# Patient Record
Sex: FEMALE | Race: WHITE | NOT HISPANIC OR LATINO | Employment: UNEMPLOYED | URBAN - METROPOLITAN AREA
[De-identification: names, ages, dates, MRNs, and addresses within clinical notes are randomized per-mention and may not be internally consistent; named-entity substitution may affect disease eponyms.]

---

## 2022-09-27 ENCOUNTER — OFFICE VISIT (OUTPATIENT)
Dept: OBSTETRICS AND GYNECOLOGY | Facility: CLINIC | Age: 32
End: 2022-09-27

## 2022-09-27 VITALS
BODY MASS INDEX: 30.94 KG/M2 | DIASTOLIC BLOOD PRESSURE: 72 MMHG | SYSTOLIC BLOOD PRESSURE: 120 MMHG | WEIGHT: 174.6 LBS | HEIGHT: 63 IN

## 2022-09-27 DIAGNOSIS — O09.299 HX OF PREECLAMPSIA, PRIOR PREGNANCY, CURRENTLY PREGNANT: ICD-10-CM

## 2022-09-27 DIAGNOSIS — Z32.01 POSITIVE URINE PREGNANCY TEST: ICD-10-CM

## 2022-09-27 DIAGNOSIS — N91.2 AMENORRHEA: Primary | ICD-10-CM

## 2022-09-27 DIAGNOSIS — Z87.59 HISTORY OF PRETERM PREMATURE RUPTURE OF MEMBRANES (PPROM): ICD-10-CM

## 2022-09-27 DIAGNOSIS — Z64.1 GRAND MULTIPARA: ICD-10-CM

## 2022-09-27 DIAGNOSIS — N92.6 IRREGULAR MENSES: ICD-10-CM

## 2022-09-27 LAB
B-HCG UR QL: POSITIVE
EXPIRATION DATE: ABNORMAL
INTERNAL NEGATIVE CONTROL: NEGATIVE
INTERNAL POSITIVE CONTROL: POSITIVE
Lab: ABNORMAL

## 2022-09-27 PROCEDURE — 81025 URINE PREGNANCY TEST: CPT | Performed by: NURSE PRACTITIONER

## 2022-09-27 PROCEDURE — 99203 OFFICE O/P NEW LOW 30 MIN: CPT | Performed by: NURSE PRACTITIONER

## 2022-09-27 RX ORDER — ONDANSETRON 4 MG/1
TABLET, ORALLY DISINTEGRATING ORAL
COMMUNITY
End: 2022-09-27

## 2022-09-27 RX ORDER — ASPIRIN 81 MG/1
81 TABLET, CHEWABLE ORAL DAILY
COMMUNITY
End: 2022-09-27

## 2022-09-27 RX ORDER — PNV NO.95/FERROUS FUM/FOLIC AC 28MG-0.8MG
1 TABLET ORAL DAILY
Qty: 90 TABLET | Refills: 3 | Status: SHIPPED | OUTPATIENT
Start: 2022-09-27

## 2022-09-27 RX ORDER — UREA 10 %
1 LOTION (ML) TOPICAL DAILY
COMMUNITY
End: 2022-09-27

## 2022-09-27 RX ORDER — RANITIDINE 150 MG/1
TABLET ORAL
COMMUNITY
End: 2022-09-27

## 2022-09-27 RX ORDER — FERROUS SULFATE 325(65) MG
TABLET ORAL DAILY
COMMUNITY
End: 2022-09-27

## 2022-09-27 RX ORDER — LOPERAMIDE HYDROCHLORIDE 2 MG/1
CAPSULE ORAL
COMMUNITY
End: 2022-09-27

## 2022-09-27 NOTE — PROGRESS NOTES
"Confirmation of pregnancy     Chief Complaint   Patient presents with   • Amenorrhea         Raina Grigsby is being seen today for evaluation of absence of menses. Due to her period being late, she tested for pregnancy and had + home UPT. She is a 31 y.o. . This problem is new to me, the examiner.       LNMP: 22  Confident with date: Yes but history of irregular cycles   Taking prenatal vitamins: No. Needs RX: Yes  Planned pregnancy: No  Prior obstetric issues, potential pregnancy concerns: H/O preeclampsia with first 3 pregnancies. H/O GHTN with second to last child. H/O polyhydramnios with last delivery.   Family history of genetic issues (includes FOB): denies  Varicella Hx:  vaccine  Flu vaccine: has not had this year  COVID 19 vaccine: has not had. Booster vaccine: has not had  History of STDs: denies  Current medications: Antibx wipes for HS  Last pap smear:   Smoker: Yes  Drug or alcohol abuse: No  H/O physical, emotional or sexual abuse: denies  H/O mental health disorder: denies  Prior testing for Cystic Fibrosis Carrier or Sickle Cell Trait- CF neg with a previous pregnancy   Prepregnancy BMI - Body mass index is 30.93 kg/m².    No past medical history on file.    No past surgical history on file.    No current outpatient medications on file.    Allergies   Allergen Reactions   • Albumen, Egg Anaphylaxis   • Ibuprofen Hives   • Peanut (Diagnostic) Anaphylaxis   • Lactose Intolerance [Lactase] Diarrhea     Pt says she can eat cheese       Social History     Socioeconomic History   • Marital status: Single       No family history on file.    Review of systems     Review of Systems   Constitutional: Positive for fever.   Gastrointestinal: Positive for nausea and vomiting.   Genitourinary: Positive for menstrual problem.       Objective    /72   Ht 160 cm (63\")   Wt 79.2 kg (174 lb 9.6 oz)   LMP 2022   Breastfeeding No   BMI 30.93 kg/m²     Physical Exam  Vitals and nursing note " reviewed.   Constitutional:       Appearance: Normal appearance.   Musculoskeletal:         General: Normal range of motion.   Skin:     General: Skin is warm and dry.   Neurological:      General: No focal deficit present.      Mental Status: She is alert and oriented to person, place, and time.   Psychiatric:         Mood and Affect: Mood normal.         Behavior: Behavior normal.         Assessment/Plan      1.  Missed menses: + UPT in office. LNMP 22 = 11.6 week EGA with an EDC=23. BS US confirms IUP with +FCA: Yes. Oriented to practice.     2.   Pregnancy: Disc importance of regular prenatal care. Enc PNV daily. Counseled on providers and on call phone. Disc Tylenol products are ok and encouraged no ibuprofen or ASA in pregnancy.  Disc exercise in pregnancy, diet, expected weight gain, etc. Enc no use of tobacco, vaping, drugs, or alcohol during pregnancy. Rev warn s/s of SAB.     3.   Labs: Pt counseled on genetic screening, Quad screen, AFP, and NIPS.    4.   Body mass index is 30.93 kg/m².    5.   Smoker- Approx 1/2 ppd. She is working on cutting back. Reports normally quits with pregnancy. During this visit, approx 3-5 minutes counseling the patient regarding smoking cessation. PT COUNSELED TO QUIT SMOKING IN PREGNANCY.  She has been informed that cigarette smoking is associated with increased incidence of  birth, growth restriction, SIDS, et. Disc that smoking cessation is the single most important thing she can do to improve the pregnancy outcome.  She verbalized understanding to this discussion.      6.   COVID19 precautions were reviewed with the patient. Continue to encourage social distancing, wearing a mask, and good hand hygiene.  I wore a mask, protective eye wear, and gloves during this patient encounter.  Patient also wearing a surgical mask and social distancing was observed. Hand hygeine performed before and after seeing the patient. Also encouraged COVID booster vaccine at 6  month interval from last COVID vaccine. Info provided on Covid vaccine: has not had, enc vaccine    7.  Flu vaccine. Encouraged the flu vaccine during pregnancy. Discuss normal physiological changes during pregnancy increase the susceptibility of the flu virus and increase the risk of severe illness for the pregnant woman. Disc flu can be harmful to the unborn baby as well. Enc the flu vaccine. Disc with patient that getting the flu vaccine is the first and most important step in protecting against the flu. Flu vaccines given during pregnancy help protect both the mother and the baby. Getting the flu vaccine during pregnancy also helps protect the  from flu illness for several months after their birth, when then are too young to get vaccinated. Also disc the importance of good hand hygiene and avoiding people who are sick. The patient declines the flu vaccine.     8.  Grand multip- The patient understands that she is a grand multipara because she has given birth to 5 or more infants which increases her risk during pregnancy for factors including but not limited to abnormal fetal presentation, precipitate delivery, uterine atony, placenta previa, uterine rupture, postpartum hemorrhage, etc.     9.  H/O irregular menses- Check dating US.     10. H/O PPROM @ 36 weeks with 5th child- Did not do Belén or vaginal progesterone with consecutive pregnancies    11. H/o preeclampsia and GHTN- Rec baby ASA after 12 weeks. Needs baseline CMP and 24 hr urine.     All questions answered.     I spent 30 minutes caring for Raina on this date of service. This time includes time spent by me in the following activities: preparing for the visit, reviewing tests, obtaining and/or reviewing a separately obtained history, performing a medically appropriate examination and/or evaluation, counseling and educating the patient/family/caregiver, ordering medications, tests, or procedures, referring and communicating with other health  care professionals, documenting information in the medical record and care coordination      Encounter Diagnoses   Name Primary?   • Amenorrhea Yes       Diagnoses and all orders for this visit:    Amenorrhea  -     POC Pregnancy, Urine    Other orders  -     Discontinue: Prenatal Vit-Fe Fumarate-FA (PRENATAL VITAMIN PO); Take 1 tablet by mouth Daily.  -     Discontinue: raNITIdine (Zantac) 150 MG tablet; Take  by mouth.  -     Discontinue: aspirin 81 MG chewable tablet; Chew 81 mg Daily.  -     Discontinue: calcium carbonate (OS-SAMAN) 1250 (500 Ca) MG chewable tablet; Chew 1 tablet Daily.  -     Discontinue: ferrous sulfate 325 (65 FE) MG tablet; Take  by mouth Daily.  -     Discontinue: loperamide (IMODIUM) 2 MG capsule; Take  by mouth.  -     Discontinue: ondansetron ODT (ZOFRAN-ODT) 4 MG disintegrating tablet; Take  by mouth.        RTO in 2 weeks for new OB exam, labs and US- [facility] TAIWO Olsen  9/27/2022  10:31 EDT

## 2022-10-10 ENCOUNTER — INITIAL PRENATAL (OUTPATIENT)
Dept: OBSTETRICS AND GYNECOLOGY | Facility: CLINIC | Age: 32
End: 2022-10-10

## 2022-10-10 VITALS — DIASTOLIC BLOOD PRESSURE: 86 MMHG | WEIGHT: 174 LBS | SYSTOLIC BLOOD PRESSURE: 104 MMHG | BODY MASS INDEX: 30.82 KG/M2

## 2022-10-10 DIAGNOSIS — E04.9 ENLARGED THYROID: ICD-10-CM

## 2022-10-10 DIAGNOSIS — O09.32 INITIAL OBSTETRIC VISIT IN SECOND TRIMESTER: ICD-10-CM

## 2022-10-10 DIAGNOSIS — O43.102 PLACENTAL ABNORMALITY IN SECOND TRIMESTER: ICD-10-CM

## 2022-10-10 DIAGNOSIS — O09.299 HX OF PREECLAMPSIA, PRIOR PREGNANCY, CURRENTLY PREGNANT: ICD-10-CM

## 2022-10-10 DIAGNOSIS — Z87.59 HISTORY OF PRETERM PREMATURE RUPTURE OF MEMBRANES (PPROM): ICD-10-CM

## 2022-10-10 DIAGNOSIS — Z23 NEEDS FLU SHOT: Primary | ICD-10-CM

## 2022-10-10 DIAGNOSIS — Z64.1 GRAND MULTIPARA: ICD-10-CM

## 2022-10-10 DIAGNOSIS — Z36.9 ENCOUNTER FOR ANTENATAL SCREENING, UNSPECIFIED: ICD-10-CM

## 2022-10-10 LAB
ALBUMIN SERPL-MCNC: 4.2 G/DL (ref 3.5–5.2)
ALBUMIN/GLOB SERPL: 2.6 G/DL
ALP SERPL-CCNC: 64 U/L (ref 39–117)
ALT SERPL-CCNC: 10 U/L (ref 1–33)
AST SERPL-CCNC: 13 U/L (ref 1–32)
BILIRUB SERPL-MCNC: 0.2 MG/DL (ref 0–1.2)
BUN SERPL-MCNC: 3 MG/DL (ref 6–20)
BUN/CREAT SERPL: 7.3 (ref 7–25)
CALCIUM SERPL-MCNC: 9.1 MG/DL (ref 8.6–10.5)
CHLORIDE SERPL-SCNC: 104 MMOL/L (ref 98–107)
CO2 SERPL-SCNC: 23 MMOL/L (ref 22–29)
CREAT SERPL-MCNC: 0.41 MG/DL (ref 0.57–1)
EGFRCR SERPLBLD CKD-EPI 2021: 135.9 ML/MIN/1.73
GLOBULIN SER CALC-MCNC: 1.6 GM/DL
GLUCOSE SERPL-MCNC: 77 MG/DL (ref 65–99)
GLUCOSE UR STRIP-MCNC: NEGATIVE MG/DL
POTASSIUM SERPL-SCNC: 4.3 MMOL/L (ref 3.5–5.2)
PROT SERPL-MCNC: 5.8 G/DL (ref 6–8.5)
PROT UR STRIP-MCNC: NEGATIVE MG/DL
SODIUM SERPL-SCNC: 137 MMOL/L (ref 136–145)

## 2022-10-10 PROCEDURE — 99214 OFFICE O/P EST MOD 30 MIN: CPT | Performed by: NURSE PRACTITIONER

## 2022-10-10 PROCEDURE — 90471 IMMUNIZATION ADMIN: CPT | Performed by: NURSE PRACTITIONER

## 2022-10-10 PROCEDURE — 90686 IIV4 VACC NO PRSV 0.5 ML IM: CPT | Performed by: NURSE PRACTITIONER

## 2022-10-10 NOTE — PROGRESS NOTES
Initial ob visit     Chief Complaint   Patient presents with   • Initial Prenatal Visit       Raina Grigsby is being seen today for her first obstetrical visit.  She is a 31 y.o.  @ 13w5d gestation. This problem is new to me: no      # 1 - Date: None, Sex: None, Weight: None, GA: None, Delivery: None, Apgar1: None, Apgar5: None, Living: None, Birth Comments: None    # 2 - Date: None, Sex: None, Weight: None, GA: None, Delivery: None, Apgar1: None, Apgar5: None, Living: None, Birth Comments: None    # 3 - Date: None, Sex: None, Weight: None, GA: None, Delivery: None, Apgar1: None, Apgar5: None, Living: None, Birth Comments: None    # 4 - Date: None, Sex: None, Weight: None, GA: None, Delivery: None, Apgar1: None, Apgar5: None, Living: None, Birth Comments: None    # 5 - Date: None, Sex: None, Weight: None, GA: None, Delivery: None, Apgar1: None, Apgar5: None, Living: None, Birth Comments: None    # 6 - Date: None, Sex: None, Weight: None, GA: None, Delivery: None, Apgar1: None, Apgar5: None, Living: None, Birth Comments: None    # 7 - Date: None, Sex: None, Weight: None, GA: None, Delivery: None, Apgar1: None, Apgar5: None, Living: None, Birth Comments: None    # 8 - Date: None, Sex: None, Weight: None, GA: None, Delivery: None, Apgar1: None, Apgar5: None, Living: None, Birth Comments: None    # 9 - Date: None, Sex: None, Weight: None, GA: None, Delivery: None, Apgar1: None, Apgar5: None, Living: None, Birth Comments: None    # 10 - Date: None, Sex: None, Weight: None, GA: None, Delivery: None, Apgar1: None, Apgar5: None, Living: None, Birth Comments: None    # 11 - Date: None, Sex: None, Weight: None, GA: None, Delivery: None, Apgar1: None, Apgar5: None, Living: None, Birth Comments: None      LNMP: 22  Confident with date: Yes but history of irregular cycles   Taking prenatal vitamins: No. Needs RX: Yes  Planned pregnancy: No  Prior obstetric issues, potential pregnancy concerns: H/O preeclampsia with  first 3 pregnancies. H/O GHTN with second to last child. H/O polyhydramnios with last delivery.   Family history of genetic issues (includes FOB): denies  Varicella Hx:  vaccine  Flu vaccine: has not had this year  COVID 19 vaccine: has not had. Booster vaccine: has not had  History of STDs: denies  Current medications: Antibx wipes for HS  Last pap smear: 2021  Smoker: Yes  Drug or alcohol abuse: No  H/O physical, emotional or sexual abuse: denies  H/O mental health disorder: denies  Prior testing for Cystic Fibrosis Carrier or Sickle Cell Trait- CF neg with a previous pregnancy   Prepregnancy BMI: Body mass index is 30.82 kg/m².      Past Medical History:   Diagnosis Date   • Hidradenitis        Past Surgical History:   Procedure Laterality Date   • GALLBLADDER SURGERY     • TONSILLECTOMY     • WISDOM TOOTH EXTRACTION           Current Outpatient Medications:   •  Prenatal Vit-Fe Fumarate-FA (prenatal vitamin 28-0.8) 28-0.8 MG tablet tablet, Take 1 tablet by mouth Daily., Disp: 90 tablet, Rfl: 3    Allergies   Allergen Reactions   • Albumen, Egg Anaphylaxis   • Ibuprofen Hives   • Peanut (Diagnostic) Anaphylaxis   • Lactose Intolerance [Lactase] Diarrhea     Pt says she can eat cheese       Social History     Socioeconomic History   • Marital status: Single   Tobacco Use   • Smoking status: Every Day   • Smokeless tobacco: Never   Substance and Sexual Activity   • Alcohol use: Never   • Drug use: Never   • Sexual activity: Yes     Partners: Male       No family history on file.    Review of systems     All other systems reviewed and are negative except for: Genitourinary: positive for missed menses     Objective    /86   Wt 78.9 kg (174 lb)   LMP 07/06/2022   BMI 30.82 kg/m²       General Appearance:    Alert, cooperative, in no acute distress, habitus overweight    Head:    Not examined   Eyes:           Not examined   Ears:  Not examined       Neck:  No thyroid enlargement or nodules present   Back:      No kyphosis present, no scoliosis present,                       Lungs:     Clear to auscultation,respirations regular, even and                   unlabored    Heart:    Regular rhythm and normal rate, normal S1 and S2, no            murmur, no gallop, no rub, no click   Breast Exam:    No masses, No nipple discharge   Abdomen:     Normal bowel sounds, no masses, no organomegaly, soft        non-tender, non-distended, no guarding, no rebound                 tenderness   Genitalia:    Vulva - No masses, no atrophy, no lesions    Vagina - No discharge, No bleeding    Cervix - No Lesions, closed. Pap collected:Yes     Uterus - Consistent with 13 weeks.     Adnexa - No masses, non tender       Extremities:   Moves all extremities well, no edema, no cyanosis, no              redness       Skin:   No bleeding, bruising or rash       Neurologic:   Sensation intact, A&O times 3      Assessment/Plan    1) Pregnancy at 13w5d-  EDC established 4/12/22 and confirmed by US, pt has h/o irregular cycles. .     2) OB exam: OB exam completed: Yes. New OB bag provided Yes. Pap collected: Yes.    3) Labs: OB labs collected: Yes Counseled on genetic screening: Yes, she declines CF, SMA and Fx. Counseled on Quad screen and AFP: Yes, she is too early for AFP. Counseled on NIPS: Yes, she desires NIPS.      4) Body mass index is 30.82 kg/m². Obese women with a pre-pregnancy BMI of 30+ should strive to gain approx 11-20 pounds for the entire pregnancy.      5)  Prenatal care: Oriented to the office and prenatal care. Encourage prenatal vitamins. Disc Tylenol products are fine, avoid aspirin and ibuprofen; Zika (travel restrictions/ok to use insect repellant); not to change cat litter; food restrictions; exercise;  avoidance of alcohol, tobacco, drugs and saunas/hot tubs.     6) COVID19 precautions were reviewed with the patient. Continue to encourage social distancing, wearing a mask, and good hand hygiene.  I wore a mask, protective eye  wear, and gloves during this patient encounter.  Patient also wearing a surgical mask and social distancing was observed. Hand hygeine performed before and after seeing the patient. Info provided on Covid vaccine: has not had, declines     7) HS- Using clindamycin wipes managed by [facility]    8) Request for sterilization- Disc BTL vs BS. Desires interval tubal @ 6 weeks postpartum. Sign tubal consent @ 28 weeks.     9) Flu vaccine. Encouraged the flu vaccine during pregnancy. Discuss normal physiological changes during pregnancy increase the susceptibility of the flu virus and increase the risk of severe illness for the pregnant woman. Disc flu can be harmful to the unborn baby as well. Enc the flu vaccine. Disc with patient that getting the flu vaccine is the first and most important step in protecting against the flu. Flu vaccines given during pregnancy help protect both the mother and the baby. Getting the flu vaccine during pregnancy also helps protect the  from flu illness for several months after their birth, when then are too young to get vaccinated. Also disc the importance of good hand hygiene and avoiding people who are sick. The patient desires the flu vaccine today.       10)  Grand multip- The patient understands that she is a grand multipara because she has given birth to 5 or more infants which increases her risk during pregnancy for factors including but not limited to abnormal fetal presentation, precipitate delivery, uterine atony, placenta previa, uterine rupture, postpartum hemorrhage, etc.      11)  H/O irregular menses- EDC established 23     12) H/O PPROM @ 36 weeks with 5th child- Did not do Belén or vaginal progesterone with consecutive pregnancies     13) H/o preeclampsia and GHTN- Rec baby ASA after 12 weeks. Check baseline CMP and 24 hr urine.     14) Enlarged thyroid- Check Thyroid panel and TPO. Check thyroid US. Pt reports being told before that she has a  goiter. States she followed up with ENT and was fine.     15) ? Placental abnormality- LUCIA no longer seen on US. ? Start of circumvallate placenta vs placenta synechiae. Ref to MFM for opinion.     All questions answered.     RTO 4 weeks     I spent 30 minutes caring for Raina on this date of service. This time includes time spent by me in the following activities: preparing for the visit, reviewing tests, obtaining and/or reviewing a separately obtained history, performing a medically appropriate examination and/or evaluation, counseling and educating the patient/family/caregiver, ordering medications, tests, or procedures, referring and communicating with other health care professionals, documenting information in the medical record, independently interpreting results and communicating that information with the patient/family/caregiver and care coordination    TAIWO Tong  10/10/2022  10:54 EDT

## 2022-10-11 LAB
ABO GROUP BLD: ABNORMAL
BASOPHILS # BLD AUTO: 0 X10E3/UL (ref 0–0.2)
BASOPHILS NFR BLD AUTO: 0 %
BLD GP AB SCN SERPL QL: NEGATIVE
EOSINOPHIL # BLD AUTO: 0.3 X10E3/UL (ref 0–0.4)
EOSINOPHIL NFR BLD AUTO: 3 %
ERYTHROCYTE [DISTWIDTH] IN BLOOD BY AUTOMATED COUNT: 12.8 % (ref 11.7–15.4)
HBA1C MFR BLD: 5.2 % (ref 4.8–5.6)
HBV SURFACE AG SERPL QL IA: NEGATIVE
HCT VFR BLD AUTO: 39.3 % (ref 34–46.6)
HCV AB S/CO SERPL IA: <0.1 S/CO RATIO (ref 0–0.9)
HGB BLD-MCNC: 13.3 G/DL (ref 11.1–15.9)
HIV 1+2 AB+HIV1 P24 AG SERPL QL IA: NON REACTIVE
IMM GRANULOCYTES # BLD AUTO: 0 X10E3/UL (ref 0–0.1)
IMM GRANULOCYTES NFR BLD AUTO: 0 %
LYMPHOCYTES # BLD AUTO: 2.3 X10E3/UL (ref 0.7–3.1)
LYMPHOCYTES NFR BLD AUTO: 23 %
MCH RBC QN AUTO: 32.2 PG (ref 26.6–33)
MCHC RBC AUTO-ENTMCNC: 33.8 G/DL (ref 31.5–35.7)
MCV RBC AUTO: 95 FL (ref 79–97)
MONOCYTES # BLD AUTO: 0.4 X10E3/UL (ref 0.1–0.9)
MONOCYTES NFR BLD AUTO: 4 %
NEUTROPHILS # BLD AUTO: 7.2 X10E3/UL (ref 1.4–7)
NEUTROPHILS NFR BLD AUTO: 70 %
PLATELET # BLD AUTO: 343 X10E3/UL (ref 150–450)
RBC # BLD AUTO: 4.13 X10E6/UL (ref 3.77–5.28)
RH BLD: POSITIVE
RPR SER QL: NON REACTIVE
RUBV IGG SERPL IA-ACNC: 1.76 INDEX
T3 SERPL-MCNC: 205 NG/DL (ref 80–200)
T4 FREE SERPL-MCNC: 0.95 NG/DL (ref 0.93–1.7)
THYROPEROXIDASE AB SERPL-ACNC: <8 IU/ML (ref 0–34)
TSH SERPL DL<=0.005 MIU/L-ACNC: 0.17 UIU/ML (ref 0.27–4.2)
VZV IGG SER IA-ACNC: 142 INDEX
WBC # BLD AUTO: 10.2 X10E3/UL (ref 3.4–10.8)

## 2022-10-12 LAB
AMPHETAMINES UR QL SCN: NEGATIVE NG/ML
BACTERIA UR CULT: NORMAL
BACTERIA UR CULT: NORMAL
BARBITURATES UR QL SCN: NEGATIVE NG/ML
BENZODIAZ UR QL SCN: NEGATIVE NG/ML
BZE UR QL SCN: NEGATIVE NG/ML
CANNABINOIDS UR QL SCN: POSITIVE NG/ML
CREAT UR-MCNC: 75.2 MG/DL (ref 20–300)
LABORATORY COMMENT REPORT: ABNORMAL
METHADONE UR QL SCN: NEGATIVE NG/ML
OPIATES UR QL SCN: NEGATIVE NG/ML
OXYCODONE+OXYMORPHONE UR QL SCN: NEGATIVE NG/ML
PCP UR QL: NEGATIVE NG/ML
PH UR: 7.7 [PH] (ref 4.5–8.9)
PROPOXYPH UR QL SCN: NEGATIVE NG/ML

## 2022-10-14 LAB
C TRACH RRNA CVX QL NAA+PROBE: NEGATIVE
CYTOLOGIST CVX/VAG CYTO: NORMAL
CYTOLOGY CVX/VAG DOC CYTO: NORMAL
CYTOLOGY CVX/VAG DOC THIN PREP: NORMAL
DX ICD CODE: NORMAL
HIV 1 & 2 AB SER-IMP: NORMAL
HPV I/H RISK 4 DNA CVX QL PROBE+SIG AMP: NEGATIVE
N GONORRHOEA RRNA CVX QL NAA+PROBE: NEGATIVE
OTHER STN SPEC: NORMAL
STAT OF ADQ CVX/VAG CYTO-IMP: NORMAL
T VAGINALIS RRNA SPEC QL NAA+PROBE: NEGATIVE

## 2022-10-15 LAB
CFDNA.FET/CFDNA.TOTAL SFR FETUS: NORMAL %
CITATION REF LAB TEST: NORMAL
FET 13+18+21+X+Y ANEUP PLAS.CFDNA: NEGATIVE
FET CHR 21 TS PLAS.CFDNA QL: NEGATIVE
FET SEX PLAS.CFDNA DOSAGE CFDNA: NORMAL
FET TS 13 RISK PLAS.CFDNA QL: NEGATIVE
FET TS 18 RISK WBC.DNA+CFDNA QL: NEGATIVE
GA EST FROM CONCEPTION DATE: NORMAL D
GESTATIONAL AGE > 9:: YES
LAB DIRECTOR NAME PROVIDER: NORMAL
LAB DIRECTOR NAME PROVIDER: NORMAL
LABORATORY COMMENT REPORT: NORMAL
LIMITATIONS OF THE TEST: NORMAL
NEGATIVE PREDICTIVE VALUE: NORMAL
NOTE: NORMAL
PERFORMANCE CHARACTERISTICS: NORMAL
POSITIVE PREDICTIVE VALUE: NORMAL
REF LAB TEST METHOD: NORMAL
TEST PERFORMANCE INFO SPEC: NORMAL

## 2022-10-15 NOTE — PROGRESS NOTES
MATERNAL FETAL MEDICINE Consult Note    Dear Dr Kylah Smith, APRN:    Thank you for your kind referral of Raina Grigsby.  As you know, she is a 31 y.o.   at  14 5/7 weeks gestation (Estimated Date of Delivery: 23). This is a consult.      Her antepartum course is complicated by:  Hx  delivery  Hx pre-eclampsia  Circumvallate placenta    Aneuploidy Screening: low risk female [id number]    HPI: Today, she denies headache, blurry vision, RUQ pain. No vaginal bleeding, no contractions.     Review of History:  Past Medical History:   Diagnosis Date   • Hidradenitis      Past Surgical History:   Procedure Laterality Date   • GALLBLADDER SURGERY     • TONSILLECTOMY     • WISDOM TOOTH EXTRACTION           Social History     Socioeconomic History   • Marital status: Single   Tobacco Use   • Smoking status: Every Day   • Smokeless tobacco: Never   Substance and Sexual Activity   • Alcohol use: Never   • Drug use: Not Currently   • Sexual activity: Yes     Partners: Male     Family History   Problem Relation Age of Onset   • Alcohol abuse Mother    • Hypertension Father    • COPD Father    • Diabetes Father    • Breast cancer Maternal Grandmother    • Diabetes Paternal Grandmother    • Prostate cancer Paternal Grandfather       Allergies   Allergen Reactions   • Albumen, Egg Anaphylaxis   • Ibuprofen Hives   • Peanut (Diagnostic) Anaphylaxis   • Lactose Intolerance [Lactase] Diarrhea     Pt says she can eat cheese      Current Outpatient Medications on File Prior to Visit   Medication Sig Dispense Refill   • aspirin 81 MG EC tablet Take 1 tablet by mouth Daily.     • Prenatal Vit-Fe Fumarate-FA (prenatal vitamin 28-0.8) 28-0.8 MG tablet tablet Take 1 tablet by mouth Daily. 90 tablet 3     No current facility-administered medications on file prior to visit.        Past obstetric, gynecological, medical, surgical, family and social history reviewed.  Relevant lab work and imaging reviewed.    Review  "of systems  Constitutional:  denies fever, chills, malaise.   ENT/Mouth:  denies sore throat, tinnitis  Eyes: denies vision changes/pain  CV:  denies chest pain  Respiratory:  denies cough/SOB  GI:  denies N/V, diarrhea, abdominal pain.    :   denies dysuria  Skin:  denies lesions or pruritis   Neuro:  denies weakness, focal neurologic symptoms    Vitals:    10/17/22 1037   BP: 109/66   Pulse: 72   Temp: 97.8 °F (36.6 °C)   TempSrc: Temporal   Weight: 79.2 kg (174 lb 8 oz)   Height: 160 cm (63\")       PHYSICAL EXAM   GENERAL: Not in acute distress, AAOx3, pleasant  CARDIO: regular rate and rhythm  PULM: symmetric chest rise, speaking in complete sentences without difficulty  NEURO: awake, alert and oriented to person, place, and time  ABDOMINAL: No fundal tenderness, no rebound or guarding, gravid  EXTREMITIES: no bilateral lower extremity edema/tenderness  SKIN: Warm, well-perfused      ULTRASOUND   Please view full ultrasound note on Imaging tab in View[employer].      ASSESSMENT/COUNSELIN y.o.   at  14 5/7 weeks gestation (Estimated Date of Delivery: 23).     -Pregnancy  [ X ] stable  [   ] improving [  ] worsening    Diagnoses and all orders for this visit:    1. Hx of preeclampsia, prior pregnancy, currently pregnant (Primary)    2. History of  premature rupture of membranes (PPROM)    3. Placental abnormality in second trimester         Circumvallate placenta:  A circumvallate placenta is one where the placental edge is somewhat lifted off the wall of the uterus. It occurs in approximately 1% of pregnancies. We discussed increased risk of FGR due to decreased placental area.  There is a theoretical increased risk of abruption.  In most recent studies, this has not panned out and it is thought that these risks are likely minimally or not increased.  I do recommend serial growth exams out of an abundance of caution.      History of pre-eclampsia:  x4 all at term, on baby ASA and going " "to get serial growth exams.      History of PPROM in 36th week  Did not offer 17OHP/vaginal progesterone due to questionable indication of 17OHP given questionionable efficacy in otherwise low risk patient (>34 week delivery, many other term deliveries).       [facility] Statement on 17OHP:               \"In late , results from the Progestin's Role in Optimizing  Gestation (PROLONG) trial were              published showing no benefit of weekly injections of 17-alpha hydroxyprogesterone caproate (17-)HPC)              from 16-20 weeks of gestation in women with a history of a lai PTB in reducing the rates of              subsequent PTB and  morbidity. The Society for Maternal-Fetal Medicine believes that the              differences in these results from the earlier Harmonys et al trial, which did show a benefit of 17-OHPC in              reducing the rate of spontaneous PTB (sPTB), may be at least partially explained by differences in study              populations. [facility] concludes that it is reasonable for providers to use 17-OHPC in women with a profile              more representative of the very-high-risk population reported in the Meis trial. For all women at risk of              recurrent sPTB, the risk/benefit discussion should incorporate a shared decision-making approach,              taking into account the lack of short-term safety concerns but uncertainty regarding benefit.\"     She asked for her cell free DNA results, so I did let her know her low risk results and that she was having a girl.      Summary of Plan  -Serial growth ultrasounds every 4 weeks starting after anatomy  -Low threshold for  fetal surveillance more often--do not see any definitive indications at this time.  - Continue baby ASA    Follow-up: No follow up with MFM scheduled, but I am happy to see for follow up at request of primary obstetrician    Thank you for the consult and opportunity to care for this " patient.  Please feel free to reach out with any questions or concerns.      I spent 20 minutes caring for this patient on this date of service. This time includes time spent by me in the following activities: preparing for the visit, reviewing tests, obtaining and/or reviewing a separately obtained history, performing a medically appropriate examination and/or evaluation, counseling and educating the patient/family/caregiver and independently interpreting results and communicating that information with the patient/family/caregiver with greater than 50% spent in counseling and coordination of care.     Radha Morse MD FACOG  Maternal Fetal Medicine-[facility]  Office: [phone]  [email or web address]

## 2022-10-17 ENCOUNTER — OFFICE VISIT (OUTPATIENT)
Dept: OBSTETRICS AND GYNECOLOGY | Facility: CLINIC | Age: 32
End: 2022-10-17

## 2022-10-17 ENCOUNTER — HOSPITAL ENCOUNTER (OUTPATIENT)
Dept: ULTRASOUND IMAGING | Facility: HOSPITAL | Age: 32
Discharge: HOME | End: 2022-10-17
Admitting: NURSE PRACTITIONER

## 2022-10-17 VITALS
BODY MASS INDEX: 30.92 KG/M2 | TEMPERATURE: 97.8 F | SYSTOLIC BLOOD PRESSURE: 109 MMHG | DIASTOLIC BLOOD PRESSURE: 66 MMHG | HEIGHT: 63 IN | WEIGHT: 174.5 LBS | HEART RATE: 72 BPM

## 2022-10-17 DIAGNOSIS — O09.299 HX OF PREECLAMPSIA, PRIOR PREGNANCY, CURRENTLY PREGNANT: Primary | ICD-10-CM

## 2022-10-17 DIAGNOSIS — Z87.59 HISTORY OF PRETERM PREMATURE RUPTURE OF MEMBRANES (PPROM): ICD-10-CM

## 2022-10-17 DIAGNOSIS — O43.102 PLACENTAL ABNORMALITY IN SECOND TRIMESTER: ICD-10-CM

## 2022-10-17 PROCEDURE — 76815 OB US LIMITED FETUS(S): CPT | Performed by: OBSTETRICS & GYNECOLOGY

## 2022-10-17 PROCEDURE — 76815 OB US LIMITED FETUS(S): CPT

## 2022-10-17 PROCEDURE — 99213 OFFICE O/P EST LOW 20 MIN: CPT | Performed by: OBSTETRICS & GYNECOLOGY

## 2022-10-17 RX ORDER — ASPIRIN 81 MG/1
81 TABLET ORAL DAILY
COMMUNITY
End: 2023-04-01 | Stop reason: HOSPADM

## 2022-10-17 NOTE — PROGRESS NOTES
"Pt. Reports that she is doing well and denies cramping, contractions or leaking of fluid at this time. Spotted brown blood for 2 days.  Has resolved. Lower left side pain 2-3 times per day. Reports feeling \"flutters\" of movement..  Denies headache, visual changes or epigastric pain. Has h/o migraines.  Denies any additional complaints at time of appointment.  Next OB appointment scheduled for 11/8/2022.    Vitals:    10/17/22 1037   BP: 109/66   Pulse: 72   Temp: 97.8 °F (36.6 °C)        "

## 2022-12-28 ENCOUNTER — PATIENT OUTREACH (OUTPATIENT)
Dept: LABOR AND DELIVERY | Facility: HOSPITAL | Age: 32
End: 2022-12-28

## 2022-12-28 ENCOUNTER — REFERRAL TRIAGE (OUTPATIENT)
Dept: LABOR AND DELIVERY | Facility: HOSPITAL | Age: 32
End: 2022-12-28

## 2022-12-28 NOTE — OUTREACH NOTE
[facility]  Unable to Reach       Questions/Answers    Flowsheet Row Responses   Pending Outreach Confirm Patient Interest   Call Attempt First   Outcome Not available   Next Call Attempt Date 01/02/23          F/U 1/2 for intake #2. Merlyt intro sent to program.    Misael De Jesus, RN  Maternity Nurse Navigator    12/28/2022, 15:35 EST

## 2023-01-02 ENCOUNTER — PATIENT OUTREACH (OUTPATIENT)
Dept: LABOR AND DELIVERY | Facility: HOSPITAL | Age: 33
End: 2023-01-02
Payer: COMMERCIAL

## 2023-01-02 NOTE — OUTREACH NOTE
[facility]  Intake    Current Estimated Gestational Age: 25w5d    Intake Assessment    Flowsheet Row Responses   Best Method for Contacting Cell   Currently Employed No   Able to keep appointments as scheduled Yes   Gender(s) and Name(s) Girl ?name   Do you have a dentist? No   Resources Presently Utilizing: WIC (Women, Infant, Children)   Maternal Warning Signs Provided   Other: Provided   Other Education How to find a dentist, WIC Benefits, Insurance benefits/Incentives          Learning Assessment    Flowsheet Row Responses   Relationship Patient   Learner Name Chikis   Does the learner have any barriers to learning? No Barriers   What is the preferred language of the learner for medical teaching? English   Is an  required? No   How does the learner prefer to learn new concepts? Listening, Reading, Demonstration      Tobacco, Alcohol, and Drug History     reports that she has been smoking. She has never used smokeless tobacco.   reports no history of alcohol use.   reports that she does not currently use drugs.    [facility]  Check-In    Current Estimated Gestational Age: 25w5d    Questions/Answers    Flowsheet Row Responses   Best Method for Contacting Cell   Demographics Reviewed Yes   Currently Employed No   Able to keep appointments as scheduled Yes   Gender(s) and Name(s) Girl ?name   Baby Active/Feeling Fetal Movemen Yes   How are you presently feeling? good   Questions regarding prenatal visits or tests to be ordered? No   Education related to new diagnoses/home equipment No   May I ask you questions about your substance use? Yes   Other Comment no hx   Supplies ready for baby Breast Pump, Car Seat, Clothing, Crib, Diapers, Feeding Supplies   Resource/Environmental Concerns None   Do you have any questions related to your care experience, your pregnancy, plans for delivery, any concerns, etc? No   Other Education How to find a dentist, WIC Benefits, Insurance  benefits/Incentives          F/U 1/0. Went ahead and sent [employer] info. Sent intake packet via [employer]. Gave [facility] info in [employer] msg.    Misael De Jesus, RN  Maternity Nurse Navigator    1/2/2023, 13:42 EST

## 2023-01-02 NOTE — OUTREACH NOTE
[facility]  Enrollment    Current Estimated Gestational Age: 25w5d    Questions/Answers    Flowsheet Row Responses   Would like to participate? Yes   Date of Intake Visit 01/02/23          Available for intake around 1pm, will call back then.    Misael De Jesus RN  Maternity Nurse Navigator    1/2/2023, 12:07 EST

## 2023-01-09 ENCOUNTER — PATIENT OUTREACH (OUTPATIENT)
Dept: LABOR AND DELIVERY | Facility: HOSPITAL | Age: 33
End: 2023-01-09
Payer: COMMERCIAL

## 2023-01-09 NOTE — OUTREACH NOTE
[facility]  Check-In    Current Estimated Gestational Age: 26w5d    Questions/Answers    Flowsheet Row Responses   Best Method for Contacting Cell   Demographics Reviewed Yes   Currently Employed No   Able to keep appointments as scheduled Yes   Gender(s) and Name(s) girl, ?name yet   Baby Active/Feeling Fetal Movemen Yes   How are you presently feeling? \"pretty good\"   Education related to new diagnoses/home equipment No   Supplies ready for baby Breast Pump, Car Seat, Clothing, Crib, Diapers, Feeding Supplies   Resource/Environmental Concerns None   Do you have any questions related to your care experience, your pregnancy, plans for delivery, any concerns, etc? No   Other Education How to find a dentist  [awaiting [facility] to call her back with appt, they do accept her insurance]        F/U around 36 wks 2.20.23.    Misael De Jesus RN  Maternity Nurse Navigator    1/9/2023, 10:44 EST

## 2023-03-01 ENCOUNTER — ROUTINE PRENATAL (OUTPATIENT)
Dept: OBSTETRICS AND GYNECOLOGY | Facility: CLINIC | Age: 33
End: 2023-03-01
Payer: COMMERCIAL

## 2023-03-01 VITALS — SYSTOLIC BLOOD PRESSURE: 112 MMHG | WEIGHT: 180 LBS | DIASTOLIC BLOOD PRESSURE: 86 MMHG | BODY MASS INDEX: 31.89 KG/M2

## 2023-03-01 DIAGNOSIS — Z34.93 PRENATAL CARE IN THIRD TRIMESTER: Primary | ICD-10-CM

## 2023-03-01 DIAGNOSIS — E04.9 ENLARGED THYROID: ICD-10-CM

## 2023-03-01 DIAGNOSIS — O09.33 INSUFFICIENT PRENATAL CARE IN THIRD TRIMESTER: ICD-10-CM

## 2023-03-01 DIAGNOSIS — Z64.1 GRAND MULTIPARA: ICD-10-CM

## 2023-03-01 DIAGNOSIS — Z36.9 ENCOUNTER FOR ANTENATAL SCREENING, UNSPECIFIED: ICD-10-CM

## 2023-03-01 LAB
GLUCOSE UR STRIP-MCNC: NEGATIVE MG/DL
PROT UR STRIP-MCNC: NEGATIVE MG/DL

## 2023-03-01 PROCEDURE — 99214 OFFICE O/P EST MOD 30 MIN: CPT | Performed by: NURSE PRACTITIONER

## 2023-03-01 NOTE — PROGRESS NOTES
OB follow up > 20 weeks    Chief Complaint   Patient presents with   • Routine Prenatal Visit     US - ON HOLD       Raina Grigsby is a 32 y.o.  34w0d being seen today for her obstetrical visit.  Patient reports swelling in her legs.  Taking prenatal vitamins: Yes. She desires to sign a tubal consent. She has not had her 2hr GTT.       Review of Systems  Genitourinary: Negative for contractions, cramping, vaginal bleeding, or SROM.   Fetal movement: normal  Allergies   Allergen Reactions   • Egg White (Egg Protein) Anaphylaxis   • Ibuprofen Hives   • Peanut (Diagnostic) Anaphylaxis   • Lactose Intolerance [Tilactase] Diarrhea     Pt says she can eat cheese        /86   Wt 81.6 kg (180 lb)   LMP 2022   BMI 31.89 kg/m²     FHT: 120s BPM   Uterine Size: size equals dates       Assessment    1) pregnancy at 34w0d- US IMP: Incomplete anatomy d/t suboptimal views d/t late PNV and gestational age- Cerebellum and CM not seen. VTX. Growth 42%.  bpm. Post placenta (circumvallate). CL 3.63cm. VINCENT 17cm. Female.     2) Insufficient care- Reports her kids had COVID and she was unable to attend appt. Missing labs- AFP (too late), 2hr GTT and Hgb.      3) S/p flu vaccine     4) COVID19 precautions were reviewed with the patient. Continue to encourage social distancing, wearing a mask, and good hand hygiene.  I wore a mask, protective eye wear, and gloves during this patient encounter.  Patient also wearing a surgical mask and social distancing was observed. Hand hygeine performed before and after seeing the patient. Info provided on Covid vaccine: has not had, declines      5) HS- Using clindamycin wipes managed by [facility]     6) Request for sterilization- Disc BTL vs BS. Desires interval tubal @ 6 weeks postpartum. Sign tubal consent today      7)  Grand multip- The patient understands that she is a grand multipara because she has given birth to 5 or more infants which increases  her risk during pregnancy for factors including but not limited to abnormal fetal presentation, precipitate delivery, uterine atony, placenta previa, uterine rupture, postpartum hemorrhage, etc.      8)  H/O irregular menses- EDC established 23     9) H/O PPROM @ 36 weeks with 5th child- Did not do [place] or vaginal progesterone with consecutive pregnancies     10) H/o preeclampsia and GHTN- Baby ASA. Did not complete baseline 24 hr urine.      11) Enlarged thyroid- TSH and T3 abnormal with new OB labs. Did not have repeat labs. Did not have thyroid US. Check with 2hr GTT this week.      12) ? Placental abnormality- LUCIA no longer seen on US. ? Start of circumvallate placenta vs placenta synechiae.  MFM rec growth q 4 weeks (pt seen at 14 weeks, no followup)    13) tDap vaccine- Disc that all pregnant women should get a Tdap shot in the third trimester, preferably between 27 weeks and 36 weeks of pregnancy. The Tdap shot is an effective and safe way to protect the baby from serious illness and complications of pertussis. Recommend that partners, family members, and infant caregivers should be up to date on theTdap vaccine if they have not previously been vaccinated. Ideally, all family members should be vaccinated at least 2 weeks before coming in contact with the . If not administered during pregnancy, the Tdap vaccine should be given immediately postpartum if the patient is not UTD on Tdap.   Info provided, pt considering.     14) + UDS- + THC. Check UDS.     15) Varicella non immune- Needs varivax postpartum     Plan    Continue prenatal vitamins  Reviewed this stage of pregnancy  Problem list updated   Follow up ASAP this week for fasting 2hr GTT, CBC, and thyroid panel with TPO. Then RTO 2 weeks for OB tummy and repeat US.     Kylah Smith, APRN  3/1/2023  14:28 EST

## 2023-03-02 LAB
AMPHETAMINES UR QL SCN: NEGATIVE NG/ML
BARBITURATES UR QL SCN: NEGATIVE NG/ML
BENZODIAZ UR QL SCN: NEGATIVE NG/ML
BZE UR QL SCN: NEGATIVE NG/ML
CANNABINOIDS UR QL SCN: POSITIVE NG/ML
CREAT UR-MCNC: 141.3 MG/DL (ref 20–300)
LABORATORY COMMENT REPORT: ABNORMAL
METHADONE UR QL SCN: NEGATIVE NG/ML
OPIATES UR QL SCN: NEGATIVE NG/ML
OXYCODONE+OXYMORPHONE UR QL SCN: NEGATIVE NG/ML
PCP UR QL: NEGATIVE NG/ML
PH UR: 7.5 [PH] (ref 4.5–8.9)
PROPOXYPH UR QL SCN: NEGATIVE NG/ML

## 2023-03-03 DIAGNOSIS — R94.6 ABNORMAL THYROID FUNCTION TEST: Primary | ICD-10-CM

## 2023-03-03 DIAGNOSIS — Z34.93 PRENATAL CARE IN THIRD TRIMESTER: ICD-10-CM

## 2023-03-03 RX ORDER — DOXYCYCLINE HYCLATE 50 MG/1
324 CAPSULE, GELATIN COATED ORAL 2 TIMES DAILY
Qty: 60 TABLET | Refills: 2 | Status: SHIPPED | OUTPATIENT
Start: 2023-03-03 | End: 2023-04-01 | Stop reason: HOSPADM

## 2023-03-06 PROBLEM — Z34.93 PRENATAL CARE IN THIRD TRIMESTER: Status: ACTIVE | Noted: 2023-03-06

## 2023-03-06 PROBLEM — O09.33 INSUFFICIENT PRENATAL CARE IN THIRD TRIMESTER: Status: ACTIVE | Noted: 2023-03-06

## 2023-03-08 ENCOUNTER — OFFICE VISIT (OUTPATIENT)
Dept: ENDOCRINOLOGY | Age: 33
End: 2023-03-08
Payer: COMMERCIAL

## 2023-03-08 VITALS
WEIGHT: 178.8 LBS | BODY MASS INDEX: 31.68 KG/M2 | SYSTOLIC BLOOD PRESSURE: 118 MMHG | OXYGEN SATURATION: 98 % | HEART RATE: 71 BPM | DIASTOLIC BLOOD PRESSURE: 70 MMHG | TEMPERATURE: 97.8 F | HEIGHT: 63 IN

## 2023-03-08 DIAGNOSIS — R94.6 ABNORMAL THYROID FUNCTION TEST: Primary | ICD-10-CM

## 2023-03-08 PROCEDURE — 1159F MED LIST DOCD IN RCRD: CPT | Performed by: INTERNAL MEDICINE

## 2023-03-08 PROCEDURE — 99243 OFF/OP CNSLTJ NEW/EST LOW 30: CPT | Performed by: INTERNAL MEDICINE

## 2023-03-08 PROCEDURE — 1160F RVW MEDS BY RX/DR IN RCRD: CPT | Performed by: INTERNAL MEDICINE

## 2023-03-08 NOTE — PROGRESS NOTES
Referring provider: TAIWO Tong     Reason for consult:  Abnormal thyroid function test    HPI:  - 32 year old female here for abnormal thyroid function test  - She is in her 35th week of pregnancy with her 8th child  - She states her pregnancy has gone well without issue  - She does state she was told in the past she had a goiter but has not other history of thyroid disease and has not been on medication for her thyroid  - She does not know if she has a family history of thyroid disease  - She states she feels like her thyroid is slightly larger but no issues with dysphagia    The following portions of the patient's history were reviewed and updated as appropriate: allergies, current medications, past family history, past medical history, past social history, past surgical history and problem list.    Review of Systems   Constitutional: Negative.    HENT: Negative.    Eyes: Negative.    Respiratory: Negative.    Cardiovascular: Negative.    Gastrointestinal: Negative.    Endocrine: Negative.    Genitourinary:        See HPI   Musculoskeletal: Negative.    Neurological: Negative.    Hematological: Negative.    Psychiatric/Behavioral: Negative.        Objective     Vitals:    03/08/23 1407   BP: 118/70   Pulse: 71   Temp: 97.8 °F (36.6 °C)   SpO2: 98%        Physical Exam  Constitutional:       Appearance: Normal appearance.   Neck:      Comments: Thyroid without nodules, slightly enlarged  Pulmonary:      Effort: Pulmonary effort is normal.   Genitourinary:     Comments: Gravid uterus  Neurological:      General: No focal deficit present.      Mental Status: She is alert.   Psychiatric:         Mood and Affect: Mood normal.         Behavior: Behavior normal.         Thought Content: Thought content normal.         Judgment: Judgment normal.       Labs/Imaging:  Reviewed labs from 3/2/23 showing a TSH of 1.27, free T4 of 0.81, T3 of 208, negative TPO.  Also reviewed labs from 10/10/22 showing a TSH of  0.170, free T4 of 0.95    Assessment & Plan   1.  Abnormal thyroid function test  - Her TSH is within the third trimester normal range  - Her free T4 is slightly low however this is normal for the third trimester of pregnancy and does not require any intervention  - I would consider repeating TSH and free T4 1 month after delivery to make sure they normalize  - She is also having a thyroid US done on 3/21 which I will review    2. Smoking  - Smoking cessation recommended especially during pregnancy    - Return to clinic PRN

## 2023-03-10 ENCOUNTER — PATIENT ROUNDING (BHMG ONLY) (OUTPATIENT)
Dept: ENDOCRINOLOGY | Age: 33
End: 2023-03-10
Payer: COMMERCIAL

## 2023-03-15 ENCOUNTER — ROUTINE PRENATAL (OUTPATIENT)
Dept: OBSTETRICS AND GYNECOLOGY | Facility: CLINIC | Age: 33
End: 2023-03-15
Payer: COMMERCIAL

## 2023-03-15 ENCOUNTER — PATIENT OUTREACH (OUTPATIENT)
Dept: LABOR AND DELIVERY | Facility: HOSPITAL | Age: 33
End: 2023-03-15
Payer: COMMERCIAL

## 2023-03-15 VITALS — SYSTOLIC BLOOD PRESSURE: 128 MMHG | BODY MASS INDEX: 31.96 KG/M2 | DIASTOLIC BLOOD PRESSURE: 74 MMHG | WEIGHT: 180.4 LBS

## 2023-03-15 DIAGNOSIS — Z36.85 ANTENATAL SCREENING FOR STREPTOCOCCUS B: Primary | ICD-10-CM

## 2023-03-15 DIAGNOSIS — Z87.59 HISTORY OF PRETERM PREMATURE RUPTURE OF MEMBRANES (PPROM): ICD-10-CM

## 2023-03-15 DIAGNOSIS — Z87.59 H/O RAPID LABOR: ICD-10-CM

## 2023-03-15 DIAGNOSIS — O09.33 INSUFFICIENT PRENATAL CARE IN THIRD TRIMESTER: ICD-10-CM

## 2023-03-15 DIAGNOSIS — F17.210 CIGARETTE SMOKER: ICD-10-CM

## 2023-03-15 DIAGNOSIS — Z64.1 GRAND MULTIPARA: ICD-10-CM

## 2023-03-15 DIAGNOSIS — O09.299 HX OF PREECLAMPSIA, PRIOR PREGNANCY, CURRENTLY PREGNANT: ICD-10-CM

## 2023-03-15 PROBLEM — N91.2 AMENORRHEA: Status: RESOLVED | Noted: 2022-09-27 | Resolved: 2023-03-15

## 2023-03-15 PROBLEM — N92.6 IRREGULAR MENSES: Status: RESOLVED | Noted: 2022-09-27 | Resolved: 2023-03-15

## 2023-03-15 PROBLEM — Z34.93 PRENATAL CARE IN THIRD TRIMESTER: Status: RESOLVED | Noted: 2023-03-06 | Resolved: 2023-03-15

## 2023-03-15 LAB
GLUCOSE UR STRIP-MCNC: NEGATIVE MG/DL
PROT UR STRIP-MCNC: NEGATIVE MG/DL

## 2023-03-15 PROCEDURE — 99214 OFFICE O/P EST MOD 30 MIN: CPT | Performed by: OBSTETRICS & GYNECOLOGY

## 2023-03-15 NOTE — PROGRESS NOTES
S:  cc: Pt here for ob office visit.  hpi: Raina Grigsby is a 32 y.o.  36w0d being seen today for her obstetrical visit.   Patient reports occasional contractions .   Fetal movement: normal. .      Social History     Social History Narrative   • Not on file      SMOKER? Yes  Raina Grigsby  reports that she has been smoking cigarettes. She has been smoking an average of .5 packs per day. She has never used smokeless tobacco.. I have educated her on the risk of diseases from using tobacco products such as cancer, COPD and heart disease.     I advised her to quit : PT COUNSELED TO QUIT SMOKING IN PREGNANCY.  (Cigarette smoking is associated with increased incidence of IUGR, PROM, PTL, PTD, SIDS, asthma, and ear infections.  . Smoking cessation is the single most important thing she can do to improve the pregnancy outcome.)   ALCOHOL? No  ILLICIT DRUGS? No    O:  /74   Wt 81.8 kg (180 lb 6.4 oz)   LMP 2022   BMI 31.96 kg/m²     Prenatal Assessment  Fetal Heart Rate: present  Fundal Height (cm): 36 cm  Movement: Present  Presentation: Vertex  Prenatal Vitals  BP: 128/74  Weight: 81.8 kg (180 lb 6.4 oz)  Urine Glucose/Protein  Urine Glucose Read-only: Negative  Urine Protein Read-only: Negative  Dilation/Effacement/Station  Dilation: 1  Effacement (%): 30  Station: -3  Vaginal Drainage  Draining Fluid: No  Edema  LLE Edema: None  RLE Edema: None  Facial Edema: None    Brief Urine Lab Results  (Last result in the past 365 days)      Color   Clarity   Blood   Leuk Est   Nitrite   Protein   CREAT   Urine HCG        03/15/23 0000           Negative                 I saw the patient with a face mask, gloves and eye protection  The patient herself was masked.  Social distancing was observed as appropriate. All COVID precautions observed.     A:    DIAGNOSES:  32 y.o.  36w0d  Diagnoses and all orders for this visit:    1.  screening for streptococcus B (Primary)  -     Strep B Screen -  Swab, Vaginal/Rectum    2. Grand multipara    3. History of  premature rupture of membranes (PPROM)    4. Hx of preeclampsia, prior pregnancy, currently pregnant    5. Insufficient prenatal care in third trimester    6. H/O rapid labor    7. Cigarette smoker      NEW PROBLEMS? Contractions, smoker    P:  Return in about 1 week (around 3/22/2023) for ob int.    Pt instructed to call for results of any testing done today and that failure to call if she has not heard from us could result in inadequate care and treament.  Pt verbalized her understanding.   Time Spent: I spent 30+ minutes caring for Raina on this date of service. This time includes time spent by me in the following activities: preparing for the visit, reviewing tests, obtaining and/or reviewing a separately obtained history, performing a medically appropriate examination and/or evaluation, counseling and educating the patient/family/caregiver, ordering medications, tests, or procedures, referring and communicating with other health care professionals, documenting information in the medical record, independently interpreting results and communicating that information with the patient/family/caregiver, care coordination and reviewing labor warnings and offered elective IOL.      Jarek Obrien MD  10:57 EDT 03/15/23

## 2023-03-15 NOTE — OUTREACH NOTE
[facility]  Check-In    Current Estimated Gestational Age: 36w0d    Questions/Answers    Flowsheet Row Responses   Best Method for Contacting Cell   Demographics Reviewed Yes   Currently Employed No   Able to keep appointments as scheduled Yes   Gender(s) and Name(s) f   Baby Active/Feeling Fetal Movemen Yes   How are you presently feeling? Good   Are you having any of the following symptoms? --  [ctx last night, none now]   New Diagnosis Anemia   Supplies ready for baby Car Seat, Clothing, Crib, Diapers, Feeding Supplies   Resource/Environmental Concerns None   Do you have any questions related to your care experience, your pregnancy, plans for delivery, any concerns, etc? No          Pt doing well today with no c/o.  Had OB appt this am.  Anemic and taking iron.  Has carseat, pump, and all supplies needed.  Plans to follow up with  dentistry about her teeth after delivery.  Sent 36wk packet, reviewed labor warning s/s, verbalized understanding.    Follow for delivery.    Misael Zambrano RN  Maternity Nurse Navigator    3/15/2023, 13:38 EDT

## 2023-03-17 LAB — GP B STREP DNA SPEC QL NAA+PROBE: NEGATIVE

## 2023-03-20 ENCOUNTER — HOSPITAL ENCOUNTER (OUTPATIENT)
Dept: ULTRASOUND IMAGING | Facility: HOSPITAL | Age: 33
Discharge: HOME | End: 2023-03-20
Admitting: NURSE PRACTITIONER
Payer: COMMERCIAL

## 2023-03-20 DIAGNOSIS — E04.9 ENLARGED THYROID: ICD-10-CM

## 2023-03-20 PROCEDURE — 76536 US EXAM OF HEAD AND NECK: CPT

## 2023-03-23 ENCOUNTER — ROUTINE PRENATAL (OUTPATIENT)
Dept: OBSTETRICS AND GYNECOLOGY | Facility: CLINIC | Age: 33
End: 2023-03-23
Payer: COMMERCIAL

## 2023-03-23 VITALS — DIASTOLIC BLOOD PRESSURE: 72 MMHG | WEIGHT: 181 LBS | BODY MASS INDEX: 32.07 KG/M2 | SYSTOLIC BLOOD PRESSURE: 120 MMHG

## 2023-03-23 DIAGNOSIS — O09.33 INSUFFICIENT PRENATAL CARE IN THIRD TRIMESTER: ICD-10-CM

## 2023-03-23 DIAGNOSIS — Z87.59 H/O RAPID LABOR: ICD-10-CM

## 2023-03-23 DIAGNOSIS — Z64.1 GRAND MULTIPARA: ICD-10-CM

## 2023-03-23 DIAGNOSIS — F17.210 CIGARETTE SMOKER: ICD-10-CM

## 2023-03-23 DIAGNOSIS — O09.299 HX OF PREECLAMPSIA, PRIOR PREGNANCY, CURRENTLY PREGNANT: Primary | ICD-10-CM

## 2023-03-23 DIAGNOSIS — Z87.59 HISTORY OF PRETERM PREMATURE RUPTURE OF MEMBRANES (PPROM): ICD-10-CM

## 2023-03-23 LAB
GLUCOSE UR STRIP-MCNC: NEGATIVE MG/DL
PROT UR STRIP-MCNC: NEGATIVE MG/DL

## 2023-03-23 NOTE — PROGRESS NOTES
OB follow up > 20 weeks    Chief Complaint   Patient presents with   • Routine Prenatal Visit       Raina Grigsby is a 32 y.o.  37+1 being seen today for her obstetrical visit.  Patient reports no complaints; asks about SVE and IOL  Taking prenatal vitamins: Yes.       Review of Systems  Genitourinary: Negative for contractions, cramping, vaginal bleeding, or SROM.   Fetal movement: normal  Allergies   Allergen Reactions   • Egg White (Egg Protein) Anaphylaxis   • Ibuprofen Hives   • Peanut (Diagnostic) Anaphylaxis   • Lactose Intolerance [Tilactase] Diarrhea     Pt says she can eat cheese        /72   Wt 82.1 kg (181 lb)   LMP 2022   BMI 32.07 kg/m²     Vitals: VSS; AF    General Appearance:  Awake. Alert. Well developed. Well nourished. In no acute distress.    Visual Inspection: ° Abdomen was normal on visual inspection.  Palpation: ° Abdomen was soft. ° Abdominal non-tender.    Uterus: ° Fundal height was normal for gestational age. ° Not tender.  Uterine Adnexae: ° Normal without masses or tenderness.  Neurological:  ° Oriented to time, place, and person.  Skin:  ° General appearance was normal. No bruising or ecchymosis.  Obstetrical: +FM and FC A  /-3    Assessment    1) pregnancy at 37+1-   GBS neg      2) Insufficient care- Reports her kids had COVID and she was unable to attend appt. Missing labs- AFP (too late), 2hr GTT and Hgb.      3) S/p flu vaccine     4) COVID19 precautions were reviewed with the patient. Continue to encourage social distancing, wearing a mask, and good hand hygiene.  I wore a mask, protective eye wear, and gloves during this patient encounter.  Patient also wearing a surgical mask and social distancing was observed. Hand hygeine performed before and after seeing the patient. Info provided on Covid vaccine: has not had, declines      5) HS- Using clindamycin wipes managed by [facility]     6) Request for sterilization- Disc BTL vs BS.  Desires interval tubal @ 6 weeks postpartum. Sign tubal consent today      7)  Grand multip- The patient understands that she is a grand multipara because she has given birth to 5 or more infants which increases her risk during pregnancy for factors including but not limited to abnormal fetal presentation, precipitate delivery, uterine atony, placenta previa, uterine rupture, postpartum hemorrhage, etc.      8)  H/O irregular menses- EDC established 23     9) H/O PPROM @ 36 weeks with 5th child- Did not do [place] or vaginal progesterone with consecutive pregnancies     10) H/o preeclampsia and GHTN- Baby ASA. Did not complete baseline 24 hr urine.      11) Enlarged thyroid- TSH and T3 abnormal with new OB labs. Did not have repeat labs. Did not have thyroid US. Check with 2hr GTT this week.      12) ? Placental abnormality- LUCIA no longer seen on US. ? Start of circumvallate placenta vs placenta synechiae.  MFM rec growth q 4 weeks (pt seen at 14 weeks, no followup)    13) tDap vaccine- Disc that all pregnant women should get a Tdap shot in the third trimester, preferably between 27 weeks and 36 weeks of pregnancy. The Tdap shot is an effective and safe way to protect the baby from serious illness and complications of pertussis. Recommend that partners, family members, and infant caregivers should be up to date on theTdap vaccine if they have not previously been vaccinated. Ideally, all family members should be vaccinated at least 2 weeks before coming in contact with the . If not administered during pregnancy, the Tdap vaccine should be given immediately postpartum if the patient is not UTD on Tdap.   Info provided, pt considering.     14) + UDS- + THC. Check UDS.     15) Varicella non immune- Needs varivax postpartum     Plan    Continue prenatal vitamins  Reviewed this stage of pregnancy  Problem list updated   Follow up weekly as scheduled. Has missed multiple appt's and recommendations by her OBGYN.  Unable to optimize as not following standard of care     I spent 30 minutes caring for Raina on this date of service. This time includes time spent by me in the following activities: preparing for the visit, reviewing tests, obtaining and/or reviewing a separately obtained history, performing a medically appropriate examination and/or evaluation, counseling and educating the patient/family/caregiver, ordering medications, tests, or procedures, documenting information in the medical record, independently interpreting results and communicating that information with the patient/family/caregiver and care coordination     Mason Aquino DO  3/23/2023  09:16 EDT

## 2023-03-30 ENCOUNTER — HOSPITAL ENCOUNTER (INPATIENT)
Facility: HOSPITAL | Age: 33
LOS: 2 days | Discharge: HOME | End: 2023-04-01
Attending: OBSTETRICS & GYNECOLOGY | Admitting: OBSTETRICS & GYNECOLOGY
Payer: COMMERCIAL

## 2023-03-30 ENCOUNTER — ANESTHESIA EVENT (OUTPATIENT)
Dept: OBSTETRICS AND GYNECOLOGY | Facility: HOSPITAL | Age: 33
End: 2023-03-30
Payer: COMMERCIAL

## 2023-03-30 ENCOUNTER — ANESTHESIA (OUTPATIENT)
Dept: OBSTETRICS AND GYNECOLOGY | Facility: HOSPITAL | Age: 33
End: 2023-03-30
Payer: COMMERCIAL

## 2023-03-30 ENCOUNTER — PATIENT OUTREACH (OUTPATIENT)
Dept: LABOR AND DELIVERY | Facility: HOSPITAL | Age: 33
End: 2023-03-30
Payer: COMMERCIAL

## 2023-03-30 ENCOUNTER — ROUTINE PRENATAL (OUTPATIENT)
Dept: OBSTETRICS AND GYNECOLOGY | Facility: CLINIC | Age: 33
End: 2023-03-30
Payer: COMMERCIAL

## 2023-03-30 VITALS — WEIGHT: 184 LBS | DIASTOLIC BLOOD PRESSURE: 98 MMHG | SYSTOLIC BLOOD PRESSURE: 160 MMHG | BODY MASS INDEX: 32.6 KG/M2

## 2023-03-30 DIAGNOSIS — Z87.59 HISTORY OF PRETERM PREMATURE RUPTURE OF MEMBRANES (PPROM): ICD-10-CM

## 2023-03-30 DIAGNOSIS — O09.299 HX OF PREECLAMPSIA, PRIOR PREGNANCY, CURRENTLY PREGNANT: Primary | ICD-10-CM

## 2023-03-30 DIAGNOSIS — N93.9 EPISODE OF HEAVY VAGINAL BLEEDING: Primary | ICD-10-CM

## 2023-03-30 DIAGNOSIS — Z98.891 S/P EMERGENCY CESAREAN SECTION: ICD-10-CM

## 2023-03-30 DIAGNOSIS — O09.33 INSUFFICIENT PRENATAL CARE IN THIRD TRIMESTER: ICD-10-CM

## 2023-03-30 DIAGNOSIS — Z64.1 GRAND MULTIPARA: ICD-10-CM

## 2023-03-30 DIAGNOSIS — O13.9 GESTATIONAL HYPERTENSION, ANTEPARTUM: ICD-10-CM

## 2023-03-30 DIAGNOSIS — F17.210 CIGARETTE SMOKER: ICD-10-CM

## 2023-03-30 DIAGNOSIS — Z87.59 H/O RAPID LABOR: ICD-10-CM

## 2023-03-30 PROBLEM — Z30.2 ENCOUNTER FOR STERILIZATION: Status: ACTIVE | Noted: 2023-03-30

## 2023-03-30 PROBLEM — Z34.90 ENCOUNTER FOR INDUCTION OF LABOR: Status: ACTIVE | Noted: 2023-03-30

## 2023-03-30 LAB
ABO GROUP BLD: NORMAL
ABO GROUP BLD: NORMAL
ALBUMIN SERPL-MCNC: 3.4 G/DL (ref 3.5–5.2)
ALBUMIN/GLOB SERPL: 1.3 G/DL
ALP SERPL-CCNC: 145 U/L (ref 39–117)
ALT SERPL W P-5'-P-CCNC: 6 U/L (ref 1–33)
AMPHET+METHAMPHET UR QL: NEGATIVE
AMPHETAMINES UR QL: NEGATIVE
ANION GAP SERPL CALCULATED.3IONS-SCNC: 10.5 MMOL/L (ref 5–15)
AST SERPL-CCNC: 16 U/L (ref 1–32)
BARBITURATES UR QL SCN: NEGATIVE
BENZODIAZ UR QL SCN: NEGATIVE
BILIRUB SERPL-MCNC: <0.2 MG/DL (ref 0–1.2)
BLD GP AB SCN SERPL QL: NEGATIVE
BUN SERPL-MCNC: 2 MG/DL (ref 6–20)
BUN/CREAT SERPL: 5.6 (ref 7–25)
BUPRENORPHINE SERPL-MCNC: NEGATIVE NG/ML
CALCIUM SPEC-SCNC: 8.6 MG/DL (ref 8.6–10.5)
CANNABINOIDS SERPL QL: NEGATIVE
CHLORIDE SERPL-SCNC: 104 MMOL/L (ref 98–107)
CO2 SERPL-SCNC: 21.5 MMOL/L (ref 22–29)
COCAINE UR QL: NEGATIVE
CREAT SERPL-MCNC: 0.36 MG/DL (ref 0.57–1)
CREAT UR-MCNC: 25.8 MG/DL
DEPRECATED RDW RBC AUTO: 50.3 FL (ref 37–54)
EGFRCR SERPLBLD CKD-EPI 2021: 139.4 ML/MIN/1.73
ERYTHROCYTE [DISTWIDTH] IN BLOOD BY AUTOMATED COUNT: 13.8 % (ref 12.3–15.4)
GLOBULIN UR ELPH-MCNC: 2.6 GM/DL
GLUCOSE SERPL-MCNC: 108 MG/DL (ref 65–99)
GLUCOSE UR STRIP-MCNC: NEGATIVE MG/DL
HCT VFR BLD AUTO: 33.9 % (ref 34–46.6)
HGB BLD-MCNC: 10.9 G/DL (ref 12–15.9)
MCH RBC QN AUTO: 31.5 PG (ref 26.6–33)
MCHC RBC AUTO-ENTMCNC: 32.2 G/DL (ref 31.5–35.7)
MCV RBC AUTO: 98 FL (ref 79–97)
METHADONE UR QL SCN: NEGATIVE
OPIATES UR QL: NEGATIVE
OXYCODONE UR QL SCN: NEGATIVE
PCP UR QL SCN: NEGATIVE
PLATELET # BLD AUTO: 371 10*3/MM3 (ref 140–450)
PMV BLD AUTO: 9.1 FL (ref 6–12)
POTASSIUM SERPL-SCNC: 3.2 MMOL/L (ref 3.5–5.2)
PROPOXYPH UR QL: NEGATIVE
PROT ?TM UR-MCNC: 7 MG/DL
PROT SERPL-MCNC: 6 G/DL (ref 6–8.5)
PROT UR STRIP-MCNC: NEGATIVE MG/DL
PROT/CREAT UR: 271.3 MG/G CREA (ref 0–200)
RBC # BLD AUTO: 3.46 10*6/MM3 (ref 3.77–5.28)
RH BLD: POSITIVE
RH BLD: POSITIVE
SODIUM SERPL-SCNC: 136 MMOL/L (ref 136–145)
T&S EXPIRATION DATE: NORMAL
TRICYCLICS UR QL SCN: NEGATIVE
WBC NRBC COR # BLD: 12.6 10*3/MM3 (ref 3.4–10.8)

## 2023-03-30 PROCEDURE — 80306 DRUG TEST PRSMV INSTRMNT: CPT | Performed by: STUDENT IN AN ORGANIZED HEALTH CARE EDUCATION/TRAINING PROGRAM

## 2023-03-30 PROCEDURE — 86900 BLOOD TYPING SEROLOGIC ABO: CPT

## 2023-03-30 PROCEDURE — 86850 RBC ANTIBODY SCREEN: CPT | Performed by: STUDENT IN AN ORGANIZED HEALTH CARE EDUCATION/TRAINING PROGRAM

## 2023-03-30 PROCEDURE — 86901 BLOOD TYPING SEROLOGIC RH(D): CPT | Performed by: STUDENT IN AN ORGANIZED HEALTH CARE EDUCATION/TRAINING PROGRAM

## 2023-03-30 PROCEDURE — 86900 BLOOD TYPING SEROLOGIC ABO: CPT | Performed by: STUDENT IN AN ORGANIZED HEALTH CARE EDUCATION/TRAINING PROGRAM

## 2023-03-30 PROCEDURE — 25010000002 MORPHINE PER 10 MG: Performed by: ANESTHESIOLOGY

## 2023-03-30 PROCEDURE — 84156 ASSAY OF PROTEIN URINE: CPT | Performed by: STUDENT IN AN ORGANIZED HEALTH CARE EDUCATION/TRAINING PROGRAM

## 2023-03-30 PROCEDURE — 25010000002 PHENYLEPHRINE 10 MG/ML SOLUTION: Performed by: ANESTHESIOLOGY

## 2023-03-30 PROCEDURE — 85027 COMPLETE CBC AUTOMATED: CPT | Performed by: STUDENT IN AN ORGANIZED HEALTH CARE EDUCATION/TRAINING PROGRAM

## 2023-03-30 PROCEDURE — 59515 CESAREAN DELIVERY: CPT | Performed by: OBSTETRICS & GYNECOLOGY

## 2023-03-30 PROCEDURE — 25010000002 AZITHROMYCIN PER 500 MG: Performed by: OBSTETRICS & GYNECOLOGY

## 2023-03-30 PROCEDURE — 82570 ASSAY OF URINE CREATININE: CPT | Performed by: STUDENT IN AN ORGANIZED HEALTH CARE EDUCATION/TRAINING PROGRAM

## 2023-03-30 PROCEDURE — 88307 TISSUE EXAM BY PATHOLOGIST: CPT

## 2023-03-30 PROCEDURE — 86901 BLOOD TYPING SEROLOGIC RH(D): CPT

## 2023-03-30 PROCEDURE — 0 CEFAZOLIN SODIUM-DEXTROSE 2-3 GM-%(50ML) RECONSTITUTED SOLUTION

## 2023-03-30 PROCEDURE — 94799 UNLISTED PULMONARY SVC/PX: CPT

## 2023-03-30 PROCEDURE — S0260 H&P FOR SURGERY: HCPCS | Performed by: OBSTETRICS & GYNECOLOGY

## 2023-03-30 PROCEDURE — 25010000002 ONDANSETRON PER 1 MG: Performed by: ANESTHESIOLOGY

## 2023-03-30 PROCEDURE — 80053 COMPREHEN METABOLIC PANEL: CPT | Performed by: STUDENT IN AN ORGANIZED HEALTH CARE EDUCATION/TRAINING PROGRAM

## 2023-03-30 RX ORDER — FAMOTIDINE 10 MG/ML
INJECTION, SOLUTION INTRAVENOUS
Status: COMPLETED
Start: 2023-03-30 | End: 2023-03-30

## 2023-03-30 RX ORDER — BUPIVACAINE HYDROCHLORIDE 7.5 MG/ML
INJECTION, SOLUTION INTRASPINAL AS NEEDED
Status: DISCONTINUED | OUTPATIENT
Start: 2023-03-30 | End: 2023-03-30 | Stop reason: SURG

## 2023-03-30 RX ORDER — DOXYCYCLINE HYCLATE 50 MG/1
324 CAPSULE, GELATIN COATED ORAL 2 TIMES DAILY
Status: DISCONTINUED | OUTPATIENT
Start: 2023-03-30 | End: 2023-04-01 | Stop reason: HOSPADM

## 2023-03-30 RX ORDER — MISOPROSTOL 200 UG/1
800 TABLET ORAL AS NEEDED
Status: DISCONTINUED | OUTPATIENT
Start: 2023-03-30 | End: 2023-04-01 | Stop reason: HOSPADM

## 2023-03-30 RX ORDER — SODIUM CHLORIDE 9 MG/ML
INJECTION, SOLUTION INTRAVENOUS
Status: DISPENSED
Start: 2023-03-30 | End: 2023-03-31

## 2023-03-30 RX ORDER — LIDOCAINE HYDROCHLORIDE 10 MG/ML
5 INJECTION, SOLUTION EPIDURAL; INFILTRATION; INTRACAUDAL; PERINEURAL AS NEEDED
Status: DISCONTINUED | OUTPATIENT
Start: 2023-03-30 | End: 2023-04-01 | Stop reason: HOSPADM

## 2023-03-30 RX ORDER — OXYTOCIN/0.9 % SODIUM CHLORIDE 30/500 ML
999 PLASTIC BAG, INJECTION (ML) INTRAVENOUS ONCE
Status: DISCONTINUED | OUTPATIENT
Start: 2023-03-30 | End: 2023-04-01 | Stop reason: HOSPADM

## 2023-03-30 RX ORDER — SIMETHICONE 80 MG
80 TABLET,CHEWABLE ORAL 4 TIMES DAILY PRN
Status: DISCONTINUED | OUTPATIENT
Start: 2023-03-30 | End: 2023-04-01 | Stop reason: HOSPADM

## 2023-03-30 RX ORDER — PHENYLEPHRINE HYDROCHLORIDE 10 MG/ML
INJECTION INTRAVENOUS AS NEEDED
Status: DISCONTINUED | OUTPATIENT
Start: 2023-03-30 | End: 2023-03-30 | Stop reason: SURG

## 2023-03-30 RX ORDER — ONDANSETRON 2 MG/ML
4 INJECTION INTRAMUSCULAR; INTRAVENOUS EVERY 6 HOURS PRN
Status: DISCONTINUED | OUTPATIENT
Start: 2023-03-30 | End: 2023-04-01 | Stop reason: HOSPADM

## 2023-03-30 RX ORDER — FAMOTIDINE 10 MG/ML
INJECTION, SOLUTION INTRAVENOUS AS NEEDED
Status: DISCONTINUED | OUTPATIENT
Start: 2023-03-30 | End: 2023-03-30 | Stop reason: SURG

## 2023-03-30 RX ORDER — OXYTOCIN/0.9 % SODIUM CHLORIDE 30/500 ML
250 PLASTIC BAG, INJECTION (ML) INTRAVENOUS CONTINUOUS
Status: ACTIVE | OUTPATIENT
Start: 2023-03-30 | End: 2023-03-30

## 2023-03-30 RX ORDER — PRENATAL VIT/IRON FUM/FOLIC AC 27MG-0.8MG
1 TABLET ORAL DAILY
Status: DISCONTINUED | OUTPATIENT
Start: 2023-03-30 | End: 2023-04-01 | Stop reason: HOSPADM

## 2023-03-30 RX ORDER — OXYTOCIN/0.9 % SODIUM CHLORIDE 30/500 ML
PLASTIC BAG, INJECTION (ML) INTRAVENOUS
Status: COMPLETED
Start: 2023-03-30 | End: 2023-03-30

## 2023-03-30 RX ORDER — ONDANSETRON 2 MG/ML
INJECTION INTRAMUSCULAR; INTRAVENOUS AS NEEDED
Status: DISCONTINUED | OUTPATIENT
Start: 2023-03-30 | End: 2023-03-30 | Stop reason: SURG

## 2023-03-30 RX ORDER — SODIUM CHLORIDE 0.9 % (FLUSH) 0.9 %
10 SYRINGE (ML) INJECTION EVERY 12 HOURS SCHEDULED
Status: DISCONTINUED | OUTPATIENT
Start: 2023-03-30 | End: 2023-04-01 | Stop reason: HOSPADM

## 2023-03-30 RX ORDER — OXYTOCIN/0.9 % SODIUM CHLORIDE 30/500 ML
125 PLASTIC BAG, INJECTION (ML) INTRAVENOUS CONTINUOUS PRN
Status: DISCONTINUED | OUTPATIENT
Start: 2023-03-30 | End: 2023-04-01 | Stop reason: HOSPADM

## 2023-03-30 RX ORDER — MORPHINE SULFATE 1 MG/ML
INJECTION, SOLUTION EPIDURAL; INTRATHECAL; INTRAVENOUS AS NEEDED
Status: DISCONTINUED | OUTPATIENT
Start: 2023-03-30 | End: 2023-03-30 | Stop reason: SURG

## 2023-03-30 RX ORDER — SODIUM CHLORIDE, SODIUM LACTATE, POTASSIUM CHLORIDE, CALCIUM CHLORIDE 600; 310; 30; 20 MG/100ML; MG/100ML; MG/100ML; MG/100ML
125 INJECTION, SOLUTION INTRAVENOUS CONTINUOUS
Status: DISCONTINUED | OUTPATIENT
Start: 2023-03-30 | End: 2023-04-01 | Stop reason: HOSPADM

## 2023-03-30 RX ORDER — MISOPROSTOL 200 UG/1
800 TABLET ORAL ONCE AS NEEDED
Status: DISCONTINUED | OUTPATIENT
Start: 2023-03-30 | End: 2023-04-01 | Stop reason: HOSPADM

## 2023-03-30 RX ORDER — CARBOPROST TROMETHAMINE 250 UG/ML
250 INJECTION, SOLUTION INTRAMUSCULAR
Status: DISCONTINUED | OUTPATIENT
Start: 2023-03-30 | End: 2023-04-01 | Stop reason: HOSPADM

## 2023-03-30 RX ORDER — OXYCODONE HYDROCHLORIDE 5 MG/1
10 TABLET ORAL EVERY 4 HOURS PRN
Status: DISCONTINUED | OUTPATIENT
Start: 2023-03-30 | End: 2023-04-01 | Stop reason: HOSPADM

## 2023-03-30 RX ORDER — DIPHENHYDRAMINE HYDROCHLORIDE 50 MG/ML
25 INJECTION INTRAMUSCULAR; INTRAVENOUS EVERY 4 HOURS PRN
Status: DISCONTINUED | OUTPATIENT
Start: 2023-03-30 | End: 2023-04-01 | Stop reason: HOSPADM

## 2023-03-30 RX ORDER — METHYLERGONOVINE MALEATE 0.2 MG/ML
200 INJECTION INTRAVENOUS ONCE AS NEEDED
Status: DISCONTINUED | OUTPATIENT
Start: 2023-03-30 | End: 2023-04-01 | Stop reason: HOSPADM

## 2023-03-30 RX ORDER — AZITHROMYCIN 500 MG/1
INJECTION, POWDER, LYOPHILIZED, FOR SOLUTION INTRAVENOUS
Status: DISPENSED
Start: 2023-03-30 | End: 2023-03-31

## 2023-03-30 RX ORDER — DOCUSATE SODIUM 100 MG/1
100 CAPSULE, LIQUID FILLED ORAL 2 TIMES DAILY PRN
Status: DISCONTINUED | OUTPATIENT
Start: 2023-03-30 | End: 2023-03-30

## 2023-03-30 RX ORDER — ONDANSETRON 4 MG/1
4 TABLET, FILM COATED ORAL EVERY 8 HOURS PRN
Status: DISCONTINUED | OUTPATIENT
Start: 2023-03-30 | End: 2023-04-01 | Stop reason: HOSPADM

## 2023-03-30 RX ORDER — MAGNESIUM CARB/ALUMINUM HYDROX 105-160MG
30 TABLET,CHEWABLE ORAL AS NEEDED
Status: DISCONTINUED | OUTPATIENT
Start: 2023-03-30 | End: 2023-03-30

## 2023-03-30 RX ORDER — ACETAMINOPHEN 500 MG
1000 TABLET ORAL ONCE
Status: COMPLETED | OUTPATIENT
Start: 2023-03-30 | End: 2023-03-30

## 2023-03-30 RX ORDER — SODIUM CHLORIDE 0.9 % (FLUSH) 0.9 %
1-10 SYRINGE (ML) INJECTION AS NEEDED
Status: DISCONTINUED | OUTPATIENT
Start: 2023-03-30 | End: 2023-04-01 | Stop reason: HOSPADM

## 2023-03-30 RX ORDER — PROMETHAZINE HYDROCHLORIDE 25 MG/1
12.5 TABLET ORAL EVERY 4 HOURS PRN
Status: DISCONTINUED | OUTPATIENT
Start: 2023-03-30 | End: 2023-04-01 | Stop reason: HOSPADM

## 2023-03-30 RX ORDER — ACETAMINOPHEN 500 MG
1000 TABLET ORAL EVERY 6 HOURS
Status: DISPENSED | OUTPATIENT
Start: 2023-03-30 | End: 2023-03-31

## 2023-03-30 RX ORDER — OXYCODONE HYDROCHLORIDE 5 MG/1
5 TABLET ORAL EVERY 4 HOURS PRN
Status: DISCONTINUED | OUTPATIENT
Start: 2023-03-30 | End: 2023-04-01 | Stop reason: HOSPADM

## 2023-03-30 RX ORDER — OXYTOCIN/0.9 % SODIUM CHLORIDE 30/500 ML
2 PLASTIC BAG, INJECTION (ML) INTRAVENOUS
Status: DISCONTINUED | OUTPATIENT
Start: 2023-03-30 | End: 2023-03-30

## 2023-03-30 RX ORDER — CEFAZOLIN SODIUM 2 G/50ML
2 SOLUTION INTRAVENOUS ONCE
Status: DISCONTINUED | OUTPATIENT
Start: 2023-03-30 | End: 2023-04-01 | Stop reason: HOSPADM

## 2023-03-30 RX ORDER — CARBOPROST TROMETHAMINE 250 UG/ML
250 INJECTION, SOLUTION INTRAMUSCULAR AS NEEDED
Status: DISCONTINUED | OUTPATIENT
Start: 2023-03-30 | End: 2023-04-01 | Stop reason: HOSPADM

## 2023-03-30 RX ORDER — DIPHENHYDRAMINE HCL 25 MG
25 CAPSULE ORAL EVERY 4 HOURS PRN
Status: DISCONTINUED | OUTPATIENT
Start: 2023-03-30 | End: 2023-04-01 | Stop reason: HOSPADM

## 2023-03-30 RX ORDER — NICOTINE 21 MG/24HR
1 PATCH, TRANSDERMAL 24 HOURS TRANSDERMAL EVERY 24 HOURS
Status: DISCONTINUED | OUTPATIENT
Start: 2023-03-30 | End: 2023-04-01 | Stop reason: HOSPADM

## 2023-03-30 RX ORDER — CEFAZOLIN SODIUM 2 G/50ML
2 SOLUTION INTRAVENOUS ONCE
Status: COMPLETED | OUTPATIENT
Start: 2023-03-30 | End: 2023-03-30

## 2023-03-30 RX ORDER — SODIUM CHLORIDE 0.9 % (FLUSH) 0.9 %
10 SYRINGE (ML) INJECTION AS NEEDED
Status: DISCONTINUED | OUTPATIENT
Start: 2023-03-30 | End: 2023-03-30

## 2023-03-30 RX ORDER — ACETAMINOPHEN 325 MG/1
650 TABLET ORAL EVERY 6 HOURS
Status: DISCONTINUED | OUTPATIENT
Start: 2023-03-31 | End: 2023-04-01 | Stop reason: HOSPADM

## 2023-03-30 RX ORDER — CEFAZOLIN SODIUM 2 G/50ML
SOLUTION INTRAVENOUS
Status: COMPLETED
Start: 2023-03-30 | End: 2023-03-30

## 2023-03-30 RX ORDER — DOCUSATE SODIUM 100 MG/1
100 CAPSULE, LIQUID FILLED ORAL 2 TIMES DAILY
Status: DISCONTINUED | OUTPATIENT
Start: 2023-03-30 | End: 2023-04-01 | Stop reason: HOSPADM

## 2023-03-30 RX ADMIN — PHENYLEPHRINE HYDROCHLORIDE 100 MCG: 10 INJECTION INTRAVENOUS at 17:54

## 2023-03-30 RX ADMIN — ACETAMINOPHEN 1000 MG: 500 TABLET ORAL at 19:25

## 2023-03-30 RX ADMIN — ONDANSETRON HYDROCHLORIDE 4 MG: 2 INJECTION, SOLUTION INTRAMUSCULAR; INTRAVENOUS at 17:40

## 2023-03-30 RX ADMIN — DOCUSATE SODIUM 100 MG: 100 CAPSULE, LIQUID FILLED ORAL at 20:41

## 2023-03-30 RX ADMIN — SODIUM CHLORIDE, POTASSIUM CHLORIDE, SODIUM LACTATE AND CALCIUM CHLORIDE 125 ML/HR: 600; 310; 30; 20 INJECTION, SOLUTION INTRAVENOUS at 12:59

## 2023-03-30 RX ADMIN — Medication 2 MILLI-UNITS/MIN: at 12:59

## 2023-03-30 RX ADMIN — OXYTOCIN-SODIUM CHLORIDE 0.9% IV SOLN 30 UNIT/500ML 2 MILLI-UNITS/MIN: 30-0.9/5 SOLUTION at 12:59

## 2023-03-30 RX ADMIN — CEFAZOLIN SODIUM 2 G: 2 SOLUTION INTRAVENOUS at 17:22

## 2023-03-30 RX ADMIN — PHENYLEPHRINE HYDROCHLORIDE 100 MCG: 10 INJECTION INTRAVENOUS at 18:03

## 2023-03-30 RX ADMIN — FAMOTIDINE 20 MG: 10 INJECTION, SOLUTION INTRAVENOUS at 17:44

## 2023-03-30 RX ADMIN — AZITHROMYCIN MONOHYDRATE 500 MG: 500 INJECTION, POWDER, LYOPHILIZED, FOR SOLUTION INTRAVENOUS at 17:38

## 2023-03-30 RX ADMIN — MORPHINE SULFATE 0.15 MG: 1 INJECTION EPIDURAL; INTRATHECAL; INTRAVENOUS at 17:36

## 2023-03-30 RX ADMIN — PHENYLEPHRINE HYDROCHLORIDE 100 MCG: 10 INJECTION INTRAVENOUS at 17:46

## 2023-03-30 RX ADMIN — BUPIVACAINE HYDROCHLORIDE IN DEXTROSE 1.6 ML: 7.5 INJECTION, SOLUTION SUBARACHNOID at 17:53

## 2023-03-30 RX ADMIN — PHENYLEPHRINE HYDROCHLORIDE 50 MCG: 10 INJECTION INTRAVENOUS at 17:39

## 2023-03-30 RX ADMIN — SODIUM CHLORIDE, POTASSIUM CHLORIDE, SODIUM LACTATE AND CALCIUM CHLORIDE: 600; 310; 30; 20 INJECTION, SOLUTION INTRAVENOUS at 18:14

## 2023-03-30 NOTE — L&D DELIVERY NOTE
[facility]   Section Operative Note    Pre-Operative Dx:   1) 32 y.o.   2) IUP at 38.1 weeks  3) IOL for GHTN  4)Indications for  section: Heavy VB, suspected abruption  5) Grandmultiparity  6) Anemia  7)Scant prenatal care  8) Smoker         Postoperative dx:     Same, plus:  9) 30 % acute abruption.      Procedure:  1 [facility]   Surgeon:   Ester Hudson MD     Assistant: Michael Sanchez CFA   Anesthesia:  Spinal   EBL:   800cc         IV Fluids: 500 mls.   UOP: 300 mls, clear at the end of the procedure.    I/O this shift:  In: 625 [I.V.:375; IV Piggyback:250]  Out: 300 [Urine:300]   Antibiotics: cefazolin (Ancef) and Zithromax     Infant:      Time of Delivery : 17:47    Gender: female  infant    Weight: 2495 g (5 lb 8 oz)     Apgars:   8@ 1 minute /       9@ 5 minutes      Meconium:   Nuchal Cord:    no  yes            Indication for C/Section: heavy VB, suspected abruption                                     Procedure Details:   Once all questions were answered, the patient was given IV antibiotics for ID prophylaxis. Pt was taken to the OR where spinal anesthesia was quickly administered. A langley catheter was placed and hung to gravity for the duration of the procedure. SCD's were placed on the lower extremities bilaterally for DVT prophylaxis. Once the pt was prepped and draped and anesthesia was found to be adequate, a Pfannensteil skin incision was made on the abdomen and carried down to the fascia. The fascia was incised and extended with Carter scissors. Kocher clamps were placed on the superior and inferior aspect of the fascia and the rectus abdominal muscles were sharply and bluntly taken down. The rectus abdominal muscles were  in the midline and the periteoneum was entered and bluntly extended. A bladder blade was then inserted and a bladder flap was created. A low transverse incision was made on the uterus and bluntly extended. Artificial rupture of membranes was  performed with bloody fluid noted. There was a nuchal cord that was reduced and the fetal head was delivered followed by the rest of the body. Cord was clamped and cut once it had stopped pulsating and the baby was taken to the warmer. Cord blood was collected and the placenta was delivered. The placenta was sent to pathology for review. The placenta was small and appeared to have a 30% acute abruption. The infant was vigorous and Apgars were 8 & 9.  The uterus was exteriorized and cleared of all clots and debris. The uterine incision was repaired in 2 layers. The first layer was a running and locked fashion with 0-Vicryl and the second layer was an imbricating repair with 0-Vicryl suture. The uterus was firm and hemostatic. The abdomen was irrigated and aspirated with warm normal saline. The uterus was placed back into the abdomen. The tubes and ovaries were normal. the patient had declined tubal ligation at the time of this procedure. The fascia was reapproximated with 0-vicryl suture. The subcutaneous layer was irrigated and cauterized as needed for hemostasis. The skin was reapproximated with 4-0 vicryl. All counts were correct for the procedure.  Pt tolerated the procedure well. Mom and baby are stable and progressing well. Assistant: Michael Sanchez CSA was responsible for performing the following activities: Retraction, Suction, Irrigation, Suturing, Closing, Placing Dressing and Delivery of Fetus and their skilled assistance was necessary for the success of this case.       Complications:   None      Disposition:   Mother to Mother Baby/Postpartum  in stable condition currently.   Baby to remains with mom  in stable condition currently.       Ester Hudson MD  3/30/2023  18:11 EDT

## 2023-03-30 NOTE — PROGRESS NOTES
"PROGRESS NOTE    S:  Pt complaining of pain with contractions.  Using nitrous oxide.    O:  /75   Pulse 64   Temp 97.8 °F (36.6 °C) (Oral)   Resp 18   Ht 160 cm (63\")   Wt 83.5 kg (184 lb)   LMP 07/06/2022   SpO2 98%   BMI 32.59 kg/m²     FHTs R    Cx: no change.  ++ bleeding.    Cxts q1-2 mins.      Pit off    A:  Heavy vag bleeding, poss abruption    P:  Prim LTCS.    RISKS, ALTERNATIVES, COMPLICATIONS OF THE PROCEDURE INCLUDING BUT NOT LIMITED TO:    INTRAOPERATIVE RISKS: INJURY TO INTERNAL AND ADJACENT ORGANS AND STRUCTURES (BOWEL, BLADDER, URETER,BLOOD VESSELS) OR HEMORRHAGE REQUIRING FURTHER SURGERY (LAPAROTOMY),  POSSIBLE NON-DIAGNOSTIC FINDINGS, DISCOVERY OF POSSIBLE MALIGNANCY, INFECTION, AND DEATH;   POSTOP COMPLICATIONS: BLEEDING, INFECTION (REQUIRING POSSIBLE REOPERATION), FAILURE OF GOAL OF SURGERY AND RECURRENCE OF ORIGINAL SYMPTOMS, PNEUMONIA, PULMONARY EMBOLISM, AND DEATH;  WERE EXPLAINED TO THE PT WHO VERBALIZED HER UNDERSTANDING.          Jarek Obrien MD  17:23 EDT  03/30/23      "

## 2023-03-30 NOTE — H&P
OB HISTORY AND PHYSICAL      Patient Care Team:  Lucina Burns APRN as PCP - General (Nurse Practitioner)  Misael De Jesus, RN as Nurse Navigator (Obstetrics)  Tabby Braden, RN as Nurse Navigator (Obstetrics)    Chief complaint: Encounter for induction of labor    32 y.o.  . Patient's last menstrual period was 2022. = 38w1d     HPI: History of Present Illness  Pt here for IOL due to elevated bps in the office today.   Pt has scant prenatal care and is a grand multip.  Pt was 2-3cms in office.  Pt was admitted for IOL and had a cook catheter placed.   After her catheter was removed, I checked and performed and amniotomy at 3cms with clear fluid.    Pt desires nitrous oxide for anesthesia.     Pt has hx rapid labors and largest infant weighed 8#      ACTIVE PROBLEM LIST:   Patient Active Problem List   Diagnosis   • Hx of preeclampsia, prior pregnancy, currently pregnant   • Grand multipara   • History of  premature rupture of membranes (PPROM)   • Positive urine pregnancy test   • Insufficient prenatal care in third trimester   • H/O rapid labor   • Cigarette smoker   • Gestational hypertension   • Request for sterilization   • Encounter for induction of labor       PMHx:   Past Medical History:   Diagnosis Date   • Gestational hypertension 3/30/2023   • Hidradenitis        PSHx:   Past Surgical History:   Procedure Laterality Date   • GALLBLADDER SURGERY     • TONSILLECTOMY     • WISDOM TOOTH EXTRACTION         Social Hx:   Social History     Socioeconomic History   • Marital status: Single   Tobacco Use   • Smoking status: Every Day     Packs/day: 0.50     Types: Cigarettes   • Smokeless tobacco: Never   Vaping Use   • Vaping Use: Never used   Substance and Sexual Activity   • Alcohol use: Never   • Drug use: Never   • Sexual activity: Yes     Partners: Male       FHx:   Family History   Problem Relation Age of Onset   • Alcohol abuse Mother    • Hypertension Father    • COPD Father     • Diabetes Father         Recently diagnosed   • Breast cancer Maternal Grandmother    • Diabetes Paternal Grandmother    • Prostate cancer Paternal Grandfather        Debilities/Disabilities Identified: None    Emotional Behavior: Appropriate    PGyn Hx:  otherwise neg    POBHx:   OB History    Para Term  AB Living   11 7 6 1 3 7   SAB IAB Ectopic Molar Multiple Live Births   3 0 0 0 0 0      # Outcome Date GA Lbr Efrain/2nd Weight Sex Delivery Anes PTL Lv   11 Current            10             9 Term            8 Term            7 Term            6 Term            5 Term            4 Term            3 SAB            2 SAB            1 SAB               Obstetric Comments   H/O PPROM @ 36 weeks with 5th pregnancy     x 7        Allergies: Egg white (egg protein), Ibuprofen, Peanut (diagnostic), and Lactose intolerance [tilactase]    Medications:   Medications Prior to Admission   Medication Sig Dispense Refill Last Dose   • aspirin 81 MG EC tablet Take 1 tablet by mouth Daily.   3/30/2023   • ferrous gluconate (FERGON) 324 MG tablet Take 1 tablet by mouth 2 (Two) Times a Day. 60 tablet 2 3/29/2023   • Prenatal Vit-Fe Fumarate-FA (prenatal vitamin 28-0.8) 28-0.8 MG tablet tablet Take 1 tablet by mouth Daily. 90 tablet 3 3/30/2023                              Current Facility-Administered Medications:   •  lactated ringers infusion, 125 mL/hr, Intravenous, Continuous, Mason Aquino DO, Last Rate: 125 mL/hr at 23 1259, 125 mL/hr at 23 1259  •  mineral oil liquid 30 mL, 30 mL, Topical, PRN, Mason Aquino, DO  •  oxytocin (PITOCIN) 30 units in 0.9% sodium chloride 500 mL (premix), 2 alexandre-units/min, Intravenous, Titrated, Camille, Jarek Mobley MD, Last Rate: 4 mL/hr at 23 1339, 4 alexandre-units/min at 23 1339  •  sodium chloride 0.9 % flush 10 mL, 10 mL, Intravenous, PRN, Mason Aquino, DO    Review of Systems   Constitutional: Negative.    HENT: Negative.   "  Eyes: Negative.    Respiratory: Negative.    Cardiovascular: Negative.    Gastrointestinal: Negative.    Endocrine: Negative.    Musculoskeletal: Negative.    Skin: Negative.    Allergic/Immunologic: Negative.    Neurological: Negative.    Hematological: Negative.    Psychiatric/Behavioral: Negative.        Vital Signs  /76   Pulse 67   Temp 97.8 °F (36.6 °C) (Oral)   Resp 18   Ht 160 cm (63\")   Wt 83.5 kg (184 lb)   LMP 2022   BMI 32.59 kg/m²     Physical Exam  Vitals and nursing note reviewed.   Constitutional:       Appearance: She is well-developed.   HENT:      Head: Normocephalic and atraumatic.   Cardiovascular:      Rate and Rhythm: Normal rate.   Pulmonary:      Effort: Pulmonary effort is normal.   Abdominal:      General: There is no distension.      Palpations: Abdomen is soft. There is no mass.      Tenderness: There is no abdominal tenderness. There is no guarding.   Genitourinary:     Vagina: No vaginal discharge.   Musculoskeletal:         General: No tenderness or deformity. Normal range of motion.      Cervical back: Normal range of motion.   Skin:     General: Skin is warm and dry.      Coloration: Skin is not pale.      Findings: No erythema or rash.   Neurological:      Mental Status: She is alert and oriented to person, place, and time.   Psychiatric:         Behavior: Behavior normal.         Thought Content: Thought content normal.         Judgment: Judgment normal.             Presentation: cephalic   Cervix: Exam by:     Dilation: Cervical Dilation (cm): 3   Effacement: Cervical Effacement: 60%   Station:  -2     hgb = 10.9      Assessment:    Patient Active Problem List   Diagnosis   • Hx of preeclampsia, prior pregnancy, currently pregnant   • Grand multipara   • History of  premature rupture of membranes (PPROM)   • Positive urine pregnancy test   • Insufficient prenatal care in third trimester   • H/O rapid labor   • Cigarette smoker   • Gestational " hypertension   • Request for sterilization   • Encounter for induction of labor       1.  Intrauterine pregnancy at 38w1d gestation with reactive fetal status.    2.  induction of labor  for hypertension  with favorable cervix  3.  Obstetrical history significant for as above.  4.  GBS status:   Strep Gp B UYEN   Date Value Ref Range Status   03/15/2023 Negative Negative Final     Comment:     Centers for Disease Control and Prevention (CDC) and American Congress  of Obstetricians and Gynecologists (ACOG) guidelines for prevention of   group B streptococcal (GBS) disease specify co-collection of  a vaginal and rectal swab specimen to maximize sensitivity of GBS  detection. Per the CDC and ACOG, swabbing both the lower vagina and  rectum substantially increases the yield of detection compared with  sampling the vagina alone.  Penicillin G, ampicillin, or cefazolin are indicated for intrapartum  prophylaxis of  GBS colonization. Reflex susceptibility  testing should be performed prior to use of clindamycin only on GBS  isolates from penicillin-allergic women who are considered a high risk  for anaphylaxis. Treatment with vancomycin without additional testing  is warranted if resistance to clindamycin is noted.         Plan:  1. Vaginal anticipated  2. Plan of care has been reviewed with patient   3.  Risks, benefits of treatment plan have been discussed.  4.  All questions have been answered.  5.  I counseled pt and her significant other/family member ref: indications for a  delivery including but not limited to 1. Maternal intolerance to labor, 2. Fetal intolerance to labor or fetal distress, 3. Failed induction, CPD or dystocia.  Pt and her family verbalized their understanding.         I discussed the patient's findings and my recommendations with patient.     Jarek Obrien MD  23  14:09 EDT

## 2023-03-30 NOTE — PROGRESS NOTES
"PROGRESS NOTE    S:  Pt more uncomfortable with contractions.     O:  /75   Pulse 64   Temp 97.8 °F (36.6 °C) (Oral)   Resp 18   Ht 160 cm (63\")   Wt 83.5 kg (184 lb)   LMP 2022   SpO2 98%   BMI 32.59 kg/m²     Cx: /-2    IUPC & FSE placed; much bloody show    FHTs R    Cxts: q 2-3 mins on pitocin    A:  Early active labor, stable fetus    P:  Anticipate vaginal delivery.  I counseled pt and her significant other/family member ref: indications for a  delivery including but not limited to 1. Maternal intolerance to labor, 2. Fetal intolerance to labor or fetal distress, 3. Failed induction, CPD or dystocia.  Pt and her family verbalized their understanding.     Jarek Obrien MD  16:51 EDT  23      "

## 2023-03-30 NOTE — NURSING NOTE
ANNETTAE performed @ 1708, pt 4/70/-2,vaginal bleeding noted. Dr. Obrien and Dr. Hudson @ BS to evaluate patient's vaginal bleeding @ 1710. Vaginal check performed by MD Hudson and discussed finding with patient and c section was called at 1712.

## 2023-03-30 NOTE — ANESTHESIA PROCEDURE NOTES
Spinal Block    Pre-sedation assessment completed: 3/30/2023 5:30 PM    Patient reassessed immediately prior to procedure    Patient location during procedure: OR  Start Time: 3/30/2023 5:32 PM  Stop Time: 3/30/2023 5:36 PM  Performed By  Anesthesiologist: Shahana Flor MD  Preanesthetic Checklist  Completed: patient identified, IV checked, site marked, risks and benefits discussed, surgical consent, monitors and equipment checked, pre-op evaluation and timeout performed  Spinal Block Prep:  Patient Position:sitting  Sterile Tech:cap, gloves, mask and sterile barriers  Prep:Chloraprep  Patient Monitoring:blood pressure monitoring, continuous pulse oximetry and EKG (FHTs)    Spinal Block Procedure  Approach:midline  Guidance:landmark technique and palpation technique  Location:L4-L5  Needle Type:Faye  Needle Gauge:27 G  Placement of Spinal needle event:cerebrospinal fluid aspirated  Paresthesia: no  Fluid Appearance:clear     Post Assessment  Patient Tolerance:patient tolerated the procedure well with no apparent complications  Complications no

## 2023-03-30 NOTE — PLAN OF CARE
Problem: Adult Inpatient Plan of Care  Goal: Plan of Care Review  Outcome: Ongoing, Progressing  Goal: Patient-Specific Goal (Individualized)  Outcome: Ongoing, Progressing  Flowsheets (Taken 3/30/2023 1845)  Individualized Care Needs: wants nitrous oxide  Goal: Absence of Hospital-Acquired Illness or Injury  Outcome: Ongoing, Progressing  Intervention: Identify and Manage Fall Risk  Recent Flowsheet Documentation  Taken 3/30/2023 1300 by Zakia Damon RN  Safety Promotion/Fall Prevention: safety round/check completed  Intervention: Prevent and Manage VTE (Venous Thromboembolism) Risk  Recent Flowsheet Documentation  Taken 3/30/2023 1842 by Zakia Damon RN  VTE Prevention/Management:   bilateral   sequential compression devices on  Goal: Optimal Comfort and Wellbeing  Outcome: Ongoing, Progressing  Intervention: Monitor Pain and Promote Comfort  Recent Flowsheet Documentation  Taken 3/30/2023 1300 by Zakia Damon RN  Pain Management Interventions: relaxation techniques promoted  Intervention: Provide Person-Centered Care  Recent Flowsheet Documentation  Taken 3/30/2023 1300 by Zakia Damon RN  Trust Relationship/Rapport:   care explained   choices provided   reassurance provided   thoughts/feelings acknowledged   emotional support provided   empathic listening provided   questions answered   questions encouraged  Goal: Readiness for Transition of Care  Outcome: Ongoing, Progressing  Intervention: Mutually Develop Transition Plan  Recent Flowsheet Documentation  Taken 3/30/2023 1230 by Zakia Damon, RN  Equipment Currently Used at Home: none  Taken 3/30/2023 1228 by Zakia Damon, RN  Equipment Currently Used at Home: none  Transportation Anticipated: family or friend will provide  Patient/Family Anticipated Services at Transition: none  Patient/Family Anticipates Transition to: home   Goal Outcome Evaluation:  Plan of Care Reviewed With: (P) patient        Progress: (P) improving     Problem:  Bleeding (Labor)  Goal: Hemostasis  Outcome: Unable to Meet, Plan Revised

## 2023-03-30 NOTE — PROGRESS NOTES
OB follow up > 20 weeks    Chief Complaint   Patient presents with   • Routine Prenatal Visit       Raina Grigsby is a 32 y.o.  38+1 being seen today for her obstetrical visit.  Patient reports no complaints; Denies RUQ pain, HA or epigastric ( Had heartburn yesterday)       Review of Systems  Genitourinary: Negative for contractions, cramping, vaginal bleeding, or SROM.   Fetal movement: normal  Allergies   Allergen Reactions   • Egg White (Egg Protein) Anaphylaxis   • Ibuprofen Hives   • Peanut (Diagnostic) Anaphylaxis   • Lactose Intolerance [Tilactase] Diarrhea     Pt says she can eat cheese        /98   Wt 83.5 kg (184 lb)   LMP 2022   BMI 32.60 kg/m²     Vitals: VSS; AF    General Appearance:  Awake. Alert. Well developed. Well nourished. In no acute distress.    Visual Inspection: ° Abdomen was normal on visual inspection.  Palpation: ° Abdomen was soft. ° Abdominal non-tender.    Uterus: ° Fundal height was normal for gestational age. ° Not tender.  Uterine Adnexae: ° Normal without masses or tenderness.  Neurological:  ° Oriented to time, place, and person.  Skin:  ° General appearance was normal. No bruising or ecchymosis.  Obstetrical: +FM and FC A  VTX bedside US     Assessment    1) pregnancy at 38+1-   GBS neg      2) Insufficient care- Reports her kids had COVID and she was unable to attend appt. Missing labs- AFP (too late), 2hr GTT and Hgb.      3) S/p flu vaccine     4) COVID19 precautions were reviewed with the patient. Continue to encourage social distancing, wearing a mask, and good hand hygiene.  I wore a mask, protective eye wear, and gloves during this patient encounter.  Patient also wearing a surgical mask and social distancing was observed. Hand hygeine performed before and after seeing the patient. Info provided on Covid vaccine: has not had, declines      5) HS- Using clindamycin wipes managed by [facility]     6) Request for sterilization- Disc  BTL vs BS. Desires interval tubal @ 6 weeks postpartum. Sign tubal consent      7)  Grand multip- The patient understands that she is a grand multipara because she has given birth to 5 or more infants which increases her risk during pregnancy for factors including but not limited to abnormal fetal presentation, precipitate delivery, uterine atony, placenta previa, uterine rupture, postpartum hemorrhage, etc.      8)  H/O irregular menses- EDC established 23     9) H/O PPROM @ 36 weeks with 5th child- Did not do Belén or vaginal progesterone with consecutive pregnancies     10) H/o preeclampsia and GHTN- Baby ASA. Did not complete baseline 24 hr urine.   Today concern for GHTN vs Pre-e SF; Sent to L&D      11) Enlarged thyroid- TSH and T3 abnormal with new OB labs. Did not have repeat labs. Did not have thyroid US. Check with 2hr GTT this week.      12) ? Placental abnormality- LUCIA no longer seen on US. ? Start of circumvallate placenta vs placenta synechiae.  MFM rec growth q 4 weeks (pt seen at 14 weeks, no followup)    13) tDap vaccine- Disc that all pregnant women should get a Tdap shot in the third trimester, preferably between 27 weeks and 36 weeks of pregnancy. The Tdap shot is an effective and safe way to protect the baby from serious illness and complications of pertussis. Recommend that partners, family members, and infant caregivers should be up to date on theTdap vaccine if they have not previously been vaccinated. Ideally, all family members should be vaccinated at least 2 weeks before coming in contact with the . If not administered during pregnancy, the Tdap vaccine should be given immediately postpartum if the patient is not UTD on Tdap.   Info provided, pt considering.     14) + UDS- + THC. Check UDS.     15) Varicella non immune- Needs varivax postpartum     Plan    Continue prenatal vitamins  Reviewed this stage of pregnancy  Problem list updated   With her B/P, Hx and insufficeint  prenatal care; Admission on L&D after speaking with colleague. We will induce, GHTN labs and deliver. Mg+ and anti hypertensive's as indicated     I spent 30 minutes caring for Raina on this date of service. This time includes time spent by me in the following activities: preparing for the visit, reviewing tests, obtaining and/or reviewing a separately obtained history, performing a medically appropriate examination and/or evaluation, counseling and educating the patient/family/caregiver, ordering medications, tests, or procedures, documenting information in the medical record, independently interpreting results and communicating that information with the patient/family/caregiver and care coordination     Mason Aquino, DO  3/30/2023  12:19 EDT

## 2023-03-30 NOTE — ANESTHESIA POSTPROCEDURE EVALUATION
Patient: Raina Grigsby    Procedure Summary     Date: 23 Room / Location: [facility] LABOR DELIVERY  [facility] LABOR DELIVERY    Anesthesia Start:  Anesthesia Stop:     Procedure:  SECTION PRIMARY (Abdomen) Diagnosis:       Episode of heavy vaginal bleeding      (Episode of heavy vaginal bleeding [N93.9])    Surgeons: Ester Husdon MD Provider: Shahana Flor MD    Anesthesia Type: ITN, spinal ASA Status: 2 - Emergent          Anesthesia Type: ITN, spinal    Vitals  Vitals Value Taken Time   /61 23 182   Temp 98.5 °F (36.9 °C) 23 182   Pulse 71 23 1824   Resp 18 23 1824   SpO2 96 % 23 182           Post Anesthesia Care and Evaluation    Patient location during evaluation: bedside  Patient participation: complete - patient participated  Level of consciousness: awake and alert  Pain score: 0  Pain management: satisfactory to patient    Airway patency: patent  Anesthetic complications: No anesthetic complications  PONV Status: none  Cardiovascular status: acceptable  Respiratory status: acceptable  Hydration status: acceptable

## 2023-03-30 NOTE — ANESTHESIA PREPROCEDURE EVALUATION
Anesthesia Evaluation     Patient summary reviewed and Nursing notes reviewed   no history of anesthetic complications:  NPO Solid Status: Waived due to emergency  NPO Liquid Status: Waived due to emergency           Airway   Mallampati: II  TM distance: >3 FB  Neck ROM: full  No difficulty expected  Dental - normal exam     Pulmonary - normal exam    breath sounds clear to auscultation  (+) a smoker Current Smoked day of surgery,   Cardiovascular - normal exam    Rhythm: regular  Rate: normal    (+) hypertension,       Neuro/Psych- negative ROS  GI/Hepatic/Renal/Endo      Musculoskeletal     Abdominal    Substance History - negative use     OB/GYN    (+) Pregnant, pregnancy induced hypertension        Other - negative ROS                       Anesthesia Plan    ASA 2 - emergent     ITN and spinal       Anesthetic plan, risks, benefits, and alternatives have been provided, discussed and informed consent has been obtained with: patient and spouse/significant other.    Use of blood products discussed with patient  Consented to blood products.       CODE STATUS:    Level Of Support Discussed With: Patient  Code Status (Patient has no pulse and is not breathing): CPR (Attempt to Resuscitate)  Medical Interventions (Patient has pulse or is breathing): Full Support  Release to patient: Routine Release

## 2023-03-30 NOTE — OUTREACH NOTE
[facility]  IP Postpartum    Questions/Answers    Flowsheet Row Responses   Best Method for Contacting Cell   Support Person Present No   Does the patient have a car seat at the hospital No  [at home]   Delivery Note Reviewed --  [IOL currently]   Is there a need for additional support/resources? No   Is additional support needed? No   Any questions or concerns? No   Is the patient going to use Meds to Beds? Yes   Details if avail over weekend   Any concerns related discharge meds/ability to  prescriptions? No   Confirm Postpartum OB appointment Yes  [knows to schedule]   Confirm initial well-child Pediatrician appointment date/time: Yes  [knows to schedule]   Additional post-discharge F/U appointments No   Does patient have transportation to appointments? Yes   Any other assistance needed to ensure she is able to attend appointments? No   Does patient have supplies needed at home for  care? Clothing, Crib, Diapers, Breast Pump          Pt doing well, here for IOL d/t elev BP in office.  Has all needed supplies.  Family on their way in for delivery.  Cook balloon in now, AROM soon.  Encouraged to reach out with any need prior to out next appt.    F/U with PP check in 4/10/23.    Misael Zambrano RN  Maternity Nurse Navigator    3/30/2023, 15:06 EDT        
Inadequate energy intake

## 2023-03-31 LAB
BASOPHILS # BLD AUTO: 0.05 10*3/MM3 (ref 0–0.2)
BASOPHILS NFR BLD AUTO: 0.4 % (ref 0–1.5)
DEPRECATED RDW RBC AUTO: 50 FL (ref 37–54)
EOSINOPHIL # BLD AUTO: 0.25 10*3/MM3 (ref 0–0.4)
EOSINOPHIL NFR BLD AUTO: 1.9 % (ref 0.3–6.2)
ERYTHROCYTE [DISTWIDTH] IN BLOOD BY AUTOMATED COUNT: 13.9 % (ref 12.3–15.4)
HCT VFR BLD AUTO: 27.9 % (ref 34–46.6)
HGB BLD-MCNC: 9.5 G/DL (ref 12–15.9)
IMM GRANULOCYTES # BLD AUTO: 0.06 10*3/MM3 (ref 0–0.05)
IMM GRANULOCYTES NFR BLD AUTO: 0.5 % (ref 0–0.5)
LYMPHOCYTES # BLD AUTO: 3.09 10*3/MM3 (ref 0.7–3.1)
LYMPHOCYTES NFR BLD AUTO: 23.3 % (ref 19.6–45.3)
MCH RBC QN AUTO: 33.3 PG (ref 26.6–33)
MCHC RBC AUTO-ENTMCNC: 34.1 G/DL (ref 31.5–35.7)
MCV RBC AUTO: 97.9 FL (ref 79–97)
MONOCYTES # BLD AUTO: 0.79 10*3/MM3 (ref 0.1–0.9)
MONOCYTES NFR BLD AUTO: 6 % (ref 5–12)
NEUTROPHILS NFR BLD AUTO: 67.9 % (ref 42.7–76)
NEUTROPHILS NFR BLD AUTO: 9.03 10*3/MM3 (ref 1.7–7)
NRBC BLD AUTO-RTO: 0 /100 WBC (ref 0–0.2)
PLATELET # BLD AUTO: 297 10*3/MM3 (ref 140–450)
PMV BLD AUTO: 9.4 FL (ref 6–12)
RBC # BLD AUTO: 2.85 10*6/MM3 (ref 3.77–5.28)
WBC NRBC COR # BLD: 13.27 10*3/MM3 (ref 3.4–10.8)

## 2023-03-31 PROCEDURE — 85025 COMPLETE CBC W/AUTO DIFF WBC: CPT | Performed by: OBSTETRICS & GYNECOLOGY

## 2023-03-31 PROCEDURE — 0503F POSTPARTUM CARE VISIT: CPT | Performed by: STUDENT IN AN ORGANIZED HEALTH CARE EDUCATION/TRAINING PROGRAM

## 2023-03-31 RX ADMIN — ACETAMINOPHEN 1000 MG: 500 TABLET ORAL at 07:30

## 2023-03-31 RX ADMIN — DOCUSATE SODIUM 100 MG: 100 CAPSULE, LIQUID FILLED ORAL at 19:39

## 2023-03-31 RX ADMIN — OXYCODONE HYDROCHLORIDE 5 MG: 5 TABLET ORAL at 22:15

## 2023-03-31 RX ADMIN — ACETAMINOPHEN 650 MG: 325 TABLET ORAL at 19:39

## 2023-03-31 RX ADMIN — SODIUM CHLORIDE, POTASSIUM CHLORIDE, SODIUM LACTATE AND CALCIUM CHLORIDE 125 ML/HR: 600; 310; 30; 20 INJECTION, SOLUTION INTRAVENOUS at 01:53

## 2023-03-31 RX ADMIN — ACETAMINOPHEN 1000 MG: 500 TABLET ORAL at 01:29

## 2023-03-31 RX ADMIN — PRENATAL VIT W/ FE FUMARATE-FA TAB 27-0.8 MG 1 TABLET: 27-0.8 TAB at 07:31

## 2023-03-31 RX ADMIN — OXYCODONE HYDROCHLORIDE 5 MG: 5 TABLET ORAL at 18:15

## 2023-03-31 RX ADMIN — ACETAMINOPHEN 1000 MG: 500 TABLET ORAL at 13:44

## 2023-03-31 RX ADMIN — DOCUSATE SODIUM 100 MG: 100 CAPSULE, LIQUID FILLED ORAL at 07:31

## 2023-03-31 RX ADMIN — SODIUM CHLORIDE, POTASSIUM CHLORIDE, SODIUM LACTATE AND CALCIUM CHLORIDE 1000 ML: 600; 310; 30; 20 INJECTION, SOLUTION INTRAVENOUS at 00:45

## 2023-03-31 NOTE — PLAN OF CARE
Problem: Adult Inpatient Plan of Care  Goal: Plan of Care Review  Outcome: Ongoing, Progressing  Flowsheets (Taken 3/31/2023 0624)  Progress: improving  Plan of Care Reviewed With:   patient   significant other  Outcome Evaluation: pt delivered 3/30 at 1747 via primary  indicated by placental abruption. fundus firm, bleeding scant. dressing C/D/I. ambulating w/out difficulty. pain well controlled w/ scheduled pain medication. urine output was below adequate for 1st half shift, U/O improved after LR bolus and oral hydration. langley out this AM, pt has not voided post removal. no s/sx of post-op complications at this time. bonding well w/ infant.     Problem: Adult Inpatient Plan of Care  Goal: Patient-Specific Goal (Individualized)  Outcome: Ongoing, Progressing  Flowsheets (Taken 3/31/2023 0624)  Individualized Care Needs: allergic to ibuprfen, other ERAS protocols being followed   Goal Outcome Evaluation:  Plan of Care Reviewed With: patient, significant other        Progress: improving  Outcome Evaluation: pt delivered 3/30 at 1747 via primary  indicated by placental abruption. fundus firm, bleeding scant. dressing C/D/I. ambulating w/out difficulty. pain well controlled w/ scheduled pain medication. urine output was below adequate for 1st half shift, U/O improved after LR bolus and oral hydration. langley out this AM, pt has not voided post removal. no s/sx of post-op complications at this time. bonding well w/ infant.

## 2023-03-31 NOTE — PROGRESS NOTES
"[facility]EL[facility]    Progress Note       Patient Name: Raina Grigsby  :  1990  MRN:  [id number]          Subjective  Postpartum Day 1:     The patient feels well.  Her pain is moderately controlled with nonsteroidal anti-inflammatory drugs and analgesics.   She is ambulating well.  Patient describes her bleeding as thin lochia.    Breastfeeding: infant latching with difficulty.           /76 (BP Location: Right arm, Patient Position: Sitting)   Pulse 58   Temp 98.1 °F (36.7 °C) (Oral)   Resp 14   Ht 160 cm (63\")   Wt 83.5 kg (184 lb)   LMP 2022   SpO2 97%   Breastfeeding Unknown   BMI 32.59 kg/m²     Review of Systems  Physical Exam:    Physical Exam  Constitutional:       Appearance: Normal appearance.   Cardiovascular:      Rate and Rhythm: Normal rate and regular rhythm.      Pulses: Normal pulses.      Heart sounds: Normal heart sounds.   Pulmonary:      Effort: Pulmonary effort is normal.      Breath sounds: Normal breath sounds.   Abdominal:      General: Abdomen is flat. Bowel sounds are normal.      Palpations: Abdomen is soft.   Skin:     General: Skin is warm and dry.   Neurological:      Mental Status: She is oriented to person, place, and time.   Psychiatric:         Behavior: Behavior normal.           Lab results reviewed:  Yes.   Results from last 7 days   Lab Units 23  0537   HEMOGLOBIN g/dL 9.5*     Infant: female  Feeding:both  Rh status: positive, Rhogam was indicated  Rubella: Immune  Contraception: Bilateral salpingectomy ( Desires interval) DID NOT HAVE with LTCS ; declined at the time               33yo   Rh+/Rub Imm POD#1 s/p uncomplicated PLTCS for moderate abruption .  Recovering as expected.  No complaints.  Voiding via langley, tolerating PO.  Regular bowel habits.  Pain well-controlled on PO meds.  Ambulating.  Lochia diminishing.    VSS WNL since delivery.  UOP adequate .  Exam unremarkable with firm fundus.  Bandage C/D/I.  Appropriate Hct drop .  No " S/S PPD.    Breast feeding.  Interval tubal for contraception.  Immunizations up to date except Varicella.      -Advance care (d/c langley, DTV, HLIV).   -Anticipate d/c tomorrow if infant cleared by peds.          Mason Aquino,     3/31/2023  10:00 EDT

## 2023-03-31 NOTE — PROGRESS NOTES
Patient: Raina Grigsby  Procedure(s):   SECTION PRIMARY  Anesthesia type: ITN, spinal    Patient location: Labor and Delivery  Vitals:    23 2334 23 0023 23 0410 23 0730   BP: 104/64 123/60 114/63 126/76   BP Location:   Right arm Right arm   Patient Position:   Lying Sitting   Pulse: 61 56 54 58   Resp:   14 14   Temp:   97.7 °F (36.5 °C) 98.1 °F (36.7 °C)   TempSrc:   Oral Oral   SpO2:   96% 97%   Weight:       Height:         Level of consciousness: awake, alert and oriented    Post-anesthesia pain: adequate analgesia  Airway patency: patent  Respiratory: unassisted  Cardiovascular: stable and blood pressure at baseline  Hydration: euvolemic    Anesthetic complications: no

## 2023-03-31 NOTE — PLAN OF CARE
"Goal Outcome Evaluation:           Progress: improving  Outcome Evaluation: VSS. Fundus firm midline U/1. Scant rubra lochia. Lower transverse incision, dressing removed today by pt in shower. Incision dry & intact, no drainage. Some mild redness noted around incision area where tape previously was. Pt states she is sensitive to tape. Will continue to monitor. Up ad juani, voiding spontaneously. Tolerating regular diet. Pt states pain is well controlled with PO medications. Breastfeeding well and bonding well with baby.      Problem: Adult Inpatient Plan of Care  Goal: Plan of Care Review  Outcome: Ongoing, Progressing  Flowsheets  Taken 3/31/2023 1840 by Bebe Dumont RN  Progress: improving  Outcome Evaluation: VSS. Fundus firm midline U/1. Scant rubra lochia. Lower transverse incision, dressing removed today by pt in shower. Incision dry & intact, no drainage. Some mild redness noted around incision area where tape previously was. Pt states she is sensitive to tape. Will continue to monitor. Up ad juani, voiding spontaneously. Tolerating regular diet. Pt states pain is well controlled with PO medications. Breastfeeding well and bonding well with baby.  Taken 3/31/2023 0624 by Florence Roth RN  Plan of Care Reviewed With:   patient   significant other  Goal: Patient-Specific Goal (Individualized)  Outcome: Ongoing, Progressing  Flowsheets (Taken 3/31/2023 1840)  Individualized Care Needs: Pt states she is hoping for discharge tomorrow because she \"really misses her other children\"  Goal: Absence of Hospital-Acquired Illness or Injury  Outcome: Ongoing, Progressing  Intervention: Identify and Manage Fall Risk  Recent Flowsheet Documentation  Taken 3/31/2023 1700 by Bebe Dumont, RN  Safety Promotion/Fall Prevention: safety round/check completed  Taken 3/31/2023 1100 by Bebe Dumont, RUBENS  Safety Promotion/Fall Prevention: safety round/check completed  Taken 3/31/2023 0930 by Bebe Dumont, RN  Safety " Promotion/Fall Prevention: safety round/check completed  Taken 3/31/2023 07 by Bebe Dumont RN  Safety Promotion/Fall Prevention: safety round/check completed  Intervention: Prevent and Manage VTE (Venous Thromboembolism) Risk  Recent Flowsheet Documentation  Taken 3/31/2023 1700 by Bebe Dumont RN  Activity Management: up ad juani  Goal: Optimal Comfort and Wellbeing  Outcome: Ongoing, Progressing  Intervention: Monitor Pain and Promote Comfort  Recent Flowsheet Documentation  Taken 3/31/2023 181 by Bebe Dumont RN  Pain Management Interventions: see MAR  Taken 3/31/2023 1344 by Bebe Dumont RN  Pain Management Interventions: see MAR  Intervention: Provide Person-Centered Care  Recent Flowsheet Documentation  Taken 3/31/2023 07 by Bebe Dumont RN  Trust Relationship/Rapport:   care explained   choices provided  Goal: Readiness for Transition of Care  Outcome: Ongoing, Progressing     Problem: Adjustment to Role Transition (Postpartum  Delivery)  Goal: Successful Maternal Role Transition  Outcome: Ongoing, Progressing  Intervention: Support Maternal Role Transition  Recent Flowsheet Documentation  Taken 3/31/2023 1700 by Bebe Dumont RN  Parent/Child Attachment Promotion: positive reinforcement provided     Problem: Bleeding (Postpartum  Delivery)  Goal: Hemostasis  Outcome: Ongoing, Progressing     Problem: Infection (Postpartum  Delivery)  Goal: Absence of Infection Signs and Symptoms  Outcome: Ongoing, Progressing     Problem: Pain (Postpartum  Delivery)  Goal: Acceptable Pain Control  Outcome: Ongoing, Progressing  Intervention: Prevent or Manage Pain  Recent Flowsheet Documentation  Taken 3/31/2023 1815 by Bebe Dumont RN  Pain Management Interventions: see MAR  Taken 3/31/2023 1344 by Bebe Dumont RN  Pain Management Interventions: see MAR     Problem: Postoperative Nausea and Vomiting (Postpartum  Delivery)  Goal: Nausea and Vomiting Relief  Outcome:  Ongoing, Progressing     Problem: Postoperative Urinary Retention (Postpartum  Delivery)  Goal: Effective Urinary Elimination  Outcome: Ongoing, Progressing     Problem: Skin Injury Risk Increased  Goal: Skin Health and Integrity  Outcome: Ongoing, Progressing

## 2023-04-01 VITALS
BODY MASS INDEX: 32.6 KG/M2 | TEMPERATURE: 98.5 F | HEIGHT: 63 IN | OXYGEN SATURATION: 97 % | WEIGHT: 184 LBS | HEART RATE: 77 BPM | DIASTOLIC BLOOD PRESSURE: 77 MMHG | SYSTOLIC BLOOD PRESSURE: 146 MMHG | RESPIRATION RATE: 16 BRPM

## 2023-04-01 PROCEDURE — 90471 IMMUNIZATION ADMIN: CPT | Performed by: OBSTETRICS & GYNECOLOGY

## 2023-04-01 PROCEDURE — 0503F POSTPARTUM CARE VISIT: CPT | Performed by: STUDENT IN AN ORGANIZED HEALTH CARE EDUCATION/TRAINING PROGRAM

## 2023-04-01 PROCEDURE — 25010000002 TETANUS-DIPHTH-ACELL PERTUSSIS 5-2.5-18.5 LF-MCG/0.5 SUSPENSION PREFILLED SYRINGE: Performed by: OBSTETRICS & GYNECOLOGY

## 2023-04-01 PROCEDURE — 90715 TDAP VACCINE 7 YRS/> IM: CPT | Performed by: OBSTETRICS & GYNECOLOGY

## 2023-04-01 RX ORDER — PSEUDOEPHEDRINE HCL 30 MG
100 TABLET ORAL 2 TIMES DAILY
Qty: 30 CAPSULE | Refills: 0 | Status: SHIPPED | OUTPATIENT
Start: 2023-04-01 | End: 2023-04-19

## 2023-04-01 RX ORDER — OXYCODONE HYDROCHLORIDE AND ACETAMINOPHEN 5; 325 MG/1; MG/1
1 TABLET ORAL EVERY 4 HOURS PRN
Qty: 20 TABLET | Refills: 0 | Status: SHIPPED | OUTPATIENT
Start: 2023-04-01 | End: 2023-04-19

## 2023-04-01 RX ADMIN — ACETAMINOPHEN 650 MG: 325 TABLET ORAL at 02:10

## 2023-04-01 RX ADMIN — OXYCODONE HYDROCHLORIDE 5 MG: 5 TABLET ORAL at 10:54

## 2023-04-01 RX ADMIN — PRENATAL VIT W/ FE FUMARATE-FA TAB 27-0.8 MG 1 TABLET: 27-0.8 TAB at 08:08

## 2023-04-01 RX ADMIN — TETANUS TOXOID, REDUCED DIPHTHERIA TOXOID AND ACELLULAR PERTUSSIS VACCINE, ADSORBED 0.5 ML: 5; 2.5; 8; 8; 2.5 SUSPENSION INTRAMUSCULAR at 08:06

## 2023-04-01 RX ADMIN — ACETAMINOPHEN 650 MG: 325 TABLET ORAL at 08:08

## 2023-04-01 RX ADMIN — OXYCODONE HYDROCHLORIDE 5 MG: 5 TABLET ORAL at 06:11

## 2023-04-01 RX ADMIN — DOCUSATE SODIUM 100 MG: 100 CAPSULE, LIQUID FILLED ORAL at 08:08

## 2023-04-01 RX ADMIN — OXYCODONE HYDROCHLORIDE 5 MG: 5 TABLET ORAL at 02:10

## 2023-04-01 NOTE — PLAN OF CARE
Goal Outcome Evaluation:      Pt adequate for discharge  Problem: Adult Inpatient Plan of Care  Goal: Plan of Care Review  Outcome: Met  Goal: Patient-Specific Goal (Individualized)  Outcome: Met  Goal: Absence of Hospital-Acquired Illness or Injury  Outcome: Met  Intervention: Identify and Manage Fall Risk  Recent Flowsheet Documentation  Taken 2023 by Marycruz Valencia RN  Safety Promotion/Fall Prevention: safety round/check completed  Intervention: Prevent and Manage VTE (Venous Thromboembolism) Risk  Recent Flowsheet Documentation  Taken 2023 by Marycruz Valencia RN  Activity Management: up ad juani  Goal: Optimal Comfort and Wellbeing  Outcome: Met  Intervention: Monitor Pain and Promote Comfort  Recent Flowsheet Documentation  Taken 2023 by Marycruz Valencia RN  Pain Management Interventions: see MAR  Intervention: Provide Person-Centered Care  Recent Flowsheet Documentation  Taken 2023 by Marycruz Valencia RN  Trust Relationship/Rapport:   care explained   choices provided   emotional support provided   empathic listening provided   questions answered   questions encouraged   reassurance provided   thoughts/feelings acknowledged  Goal: Readiness for Transition of Care  Outcome: Met     Problem: Adjustment to Role Transition (Postpartum  Delivery)  Goal: Successful Maternal Role Transition  Outcome: Met     Problem: Bleeding (Postpartum  Delivery)  Goal: Hemostasis  Outcome: Met     Problem: Infection (Postpartum  Delivery)  Goal: Absence of Infection Signs and Symptoms  Outcome: Met     Problem: Pain (Postpartum  Delivery)  Goal: Acceptable Pain Control  Outcome: Met  Intervention: Prevent or Manage Pain  Recent Flowsheet Documentation  Taken 2023 by Marycruz Valencia RN  Pain Management Interventions: see MAR     Problem: Postoperative Nausea and Vomiting (Postpartum  Delivery)  Goal: Nausea and Vomiting  Relief  Outcome: Met     Problem: Postoperative Urinary Retention (Postpartum  Delivery)  Goal: Effective Urinary Elimination  Outcome: Met     Problem: Skin Injury Risk Increased  Goal: Skin Health and Integrity  Outcome: Met

## 2023-04-01 NOTE — DISCHARGE SUMMARY
"Obstetrical Discharge Form    Primary OB Clinician: DAILY      Preadmission Diagnosis:  1. Raina Grigsby is a 32 y.o.  with IUP @ 38w1d concern for GHTN.     Discharge Diagnosis:  Same, plus:   Insufficient prenatal care  GHTN  Grand multiparity  H/O PPROM  H/O Pre-e and GTHN  Smoker  Varicella NI     Antepartum complications: pregnancy-induced hypertension    Date of Delivery:  3/30/2023     Delivered By: Ester Hudson     Delivery Type: primary  section, low transverse incision    Tubal Ligation: n/a    Baby: Liveborn female    Anesthesia: spinal    Intrapartum complications: Abruptio Placenta    Laceration: n/a    Feeding method: both breast and bottle - .    Hospital Course: Raina Grigsby is a 32 y.o.   who presented with an IUP 38w1d and admitted for concern for GHTN. In labor noted to have large bleeding and clots. PLTCS for concern for abruption. Uncomplicated LTCS and PP progress. See operative and progress notes for more details.     Discharge Date: 2023; Discharge Time: 08:45 EDT    Review of Systems  /77 (BP Location: Right arm, Patient Position: Sitting)   Pulse 77   Temp 98.5 °F (36.9 °C) (Oral)   Resp 16   Ht 160 cm (63\")   Wt 83.5 kg (184 lb)   LMP 2022   SpO2 97%   Breastfeeding Unknown   BMI 32.59 kg/m²      Physical Exam  Constitutional:       Appearance: Normal appearance. She is obese.   Cardiovascular:      Rate and Rhythm: Normal rate and regular rhythm.      Pulses: Normal pulses.      Heart sounds: Normal heart sounds.   Pulmonary:      Effort: Pulmonary effort is normal.      Breath sounds: Normal breath sounds.   Abdominal:      General: Abdomen is flat. Bowel sounds are normal.      Palpations: Abdomen is soft.   Skin:     Findings: Bruising and ecchymosis present.      Comments: Incision C/D/I    Neurological:      Mental Status: She is oriented to person, place, and time.   Psychiatric:         Mood and Affect: Mood normal.         " Behavior: Behavior normal.              VS with normotensive to MR LINDA/P since delivery.  Exam unremarkable with firm fundus.  Appropriate Hct drop .  No S/S PPD.    Results from last 7 days   Lab Units 03/31/23  0537   HEMOGLOBIN g/dL 9.5*     Infant: female  Feeding:both  Rh status: positive, Rhogam was not indicated  Rubella: Immune  Diabetes: no  Contraception: Interval salpingectomy ( Declined at time of LTCS )         Plan:  D/c today if infant cleared by peds.   Patient given written instruction sheet.  Follow-up appointment with [facility] in 1 week for B/P check .    Discharge time was less than 30 minutes.     Mason Aquino DO  08:45 EDT  4/1/2023

## 2023-04-02 NOTE — CASE MANAGEMENT/SOCIAL WORK
Case Management Discharge Note      Final Note: dc home         Selected Continued Care - Discharged on 4/1/2023 Admission date: 3/30/2023 - Discharge disposition: Home or Self Care    Destination    No services have been selected for the patient.              Durable Medical Equipment    No services have been selected for the patient.              Dialysis/Infusion    No services have been selected for the patient.              Home Medical Care    No services have been selected for the patient.              Therapy    No services have been selected for the patient.              Community Resources    No services have been selected for the patient.              Community & DME    No services have been selected for the patient.                Selected Continued Care - Episodes Includes continued care and service providers with selected services from the active episodes listed below    [facility] Episode start date: 12/28/2022   There are no active outsourced providers for this episode.                    Final Discharge Disposition Code: 01 - home or self-care

## 2023-04-06 LAB
LAB AP CASE REPORT: NORMAL
PATH REPORT.FINAL DX SPEC: NORMAL

## 2023-04-07 ENCOUNTER — POSTPARTUM VISIT (OUTPATIENT)
Dept: OBSTETRICS AND GYNECOLOGY | Facility: CLINIC | Age: 33
End: 2023-04-07
Payer: COMMERCIAL

## 2023-04-07 VITALS
DIASTOLIC BLOOD PRESSURE: 86 MMHG | BODY MASS INDEX: 30.12 KG/M2 | WEIGHT: 170 LBS | HEIGHT: 63 IN | SYSTOLIC BLOOD PRESSURE: 144 MMHG

## 2023-04-07 DIAGNOSIS — O13.9 GESTATIONAL HYPERTENSION, ANTEPARTUM: ICD-10-CM

## 2023-04-07 DIAGNOSIS — F17.210 CIGARETTE SMOKER: ICD-10-CM

## 2023-04-07 DIAGNOSIS — Z98.891 S/P EMERGENCY CESAREAN SECTION: Primary | ICD-10-CM

## 2023-04-07 PROCEDURE — 99024 POSTOP FOLLOW-UP VISIT: CPT | Performed by: OBSTETRICS & GYNECOLOGY

## 2023-04-07 RX ORDER — LABETALOL 100 MG/1
100 TABLET, FILM COATED ORAL 2 TIMES DAILY
Qty: 60 TABLET | Refills: 6 | Status: SHIPPED | OUTPATIENT
Start: 2023-04-07

## 2023-04-07 NOTE — PROGRESS NOTES
"POSTPARTUM CHECK    S:  Pt here for bp check, sp Prim C/S for abruption a week ago.  Pt had elevated bps.  Pt without complaints.  Pumping. No pp depression.     O:  /86   Ht 160 cm (63\")   Wt 77.1 kg (170 lb)   LMP 07/06/2022   Breastfeeding No   BMI 30.11 kg/m² .     Inc looks good.  bp noted    A:  Postop C/S for abruption.  Still w/ elevated bps.    P:  Start labetalol.  Hypertension precautions given. RTO 2 weeks for recheck.      Jarek Obrien MD  00:00 EDT  04/18/23      Answers for HPI/ROS submitted by the patient on 4/4/2023  Please describe your symptoms.: C section follow up and blood pressure check  Have you had these symptoms before?: No  How long have you been having these symptoms?: 1-4 days  Please list any medications you are currently taking for this condition.: Docusate [id number], Oxycod/acetamin 5-325mg  What is the primary reason for your visit?: Other      "

## 2023-04-10 ENCOUNTER — PATIENT OUTREACH (OUTPATIENT)
Dept: LABOR AND DELIVERY | Facility: HOSPITAL | Age: 33
End: 2023-04-10
Payer: COMMERCIAL

## 2023-04-10 NOTE — OUTREACH NOTE
[facility]  Postpartum Check-In    Questions/Answers    Flowsheet Row Responses   Visit Setting Telephone   Best Method for Contacting Cell   OB Discharge Note Reviewed  Reviewed   OB Discharge Navigator Reviewed  Reviewed   OB Discharge Medications Reviewed  Reviewed   Alma discharged home with mother? Yes   Current Pain Levels 0-10 0   At Rest Pain Levels 0-10 0   Pain level with activity 0-10 0   Acceptable Pain Level 0-10 5   How do you treat your pain? Pillow Support, Position Changes, Medications   Verbalized Emotional State Acceptance   Family/Support Network Significant Other   Level of Involvement in Care Attentive, Interactive, Supportive, Inattentive   Do you feel comfortable in your relationship with your baby? Yes   Have members of your household adjusted to your baby? Yes   Is the baby's father supportive and/or involved with the baby? Yes   How does your partner feel about the baby? Happy, Involved   Do you feel safe at home, school and work? Yes   Are you in a relationship with someone who threatens you or hurts you? No   Do you have the resources to keep yourself and your baby healthy and safe? Yes   Lochia (per patient report) Brown-ramirez Red   Amount Scant   Number of pads per day 2   Lochia Odor None   Is patient breastfeeding? Yes, pumping   Breast Care Supportive Bra   pumping - Left Breast Nontender, Soft   Pumping - Right Breast Soft, Nontender   Pumping - Left Nipple Intact   Pumping - Right Nipple Intact   Frequency q3h   Pumping Quantity 4-6oz   Storage of Milk back feeding   Postpartum Depression Screening Education Education Provided   Doctor Appointments: Education Provided   Breastfeeding Education Education Provided   Family Planning Education Education Provided   Postpartum Care Education Education Provided   S & S to report Education Provided   Followup Appointments Made Yes   Well Child Visit Appointments Made Yes   Appointment Date 23   Provider/Agency [facility]    Well Child Checkup Provider Name  Kasey   Well Child Check Up Date: 23   Did you complete the visit? No  [went to Health dept last Monday, weighed, back to birth wt. Ped was back logged.]   Do you need to reschedule this appointment? No   Umbilical Cord No reported signs or symptoms   Was the baby circumcised? No   Feeding Readiness Cues: Eager, Energy for feeding   Infant Feeding Method Expressed Breast Milk, Formula   Is a lactation referral indicated? No   Formula Type --  [advanced]   Formula PO (mL) 3oz   Formula/Expressed Milk frequency of feedings: q3h   Expressed milk PO (mL) 3oz   Expressed milk- frequency of feedings q3h every other with formula   Number of wet diapers x 24 hours 10   Last BM x 24 hours 8   What safe sleep surface is available? Bassinet   Are there stuffed animals, toys, pillows, quilts, blankets, wedges, positioners, bumpers or other loose bedding in the infant's sleeping environment? No   Where does the baby usually sleep? Bassinet   Does the baby ever share a sleep surface with a sibling, adult or pet? No   Does the baby ever share a sleep surface in a bed, couch, recliner or other? No   What position do you place your baby to sleep for naps? Back   What position do you place your baby to sleep at night Back   Are you and/or other caregivers smoking inside or outside the baby's home? No   Is the infant dressed appropiately for the temperature of the home? Yes   Do you use a clean, dry pacifier that is not attached to a string or stuffed animal? No          Review of Systems    Most Recent [place]  Depression Scale Score (EPDS)    Performed by a clinician: 0 (4/10/2023 10:18 AM)    Received via [employer] questionnaire:  ()     Pt doing well, no c/o.  Says she is feeling well.  C/S 3.30 for abruption. Plans PPS at 6 weeks.  Has been seen by OB for post op follow up already, placed on BP meds BID, says she no longer has HA, reviewed s/s to report.  Breastmilk and formula  alternating q3h.    Sent [employer] grad letter.  Sent to call center.    Misael Zambrano RN  Maternity Nurse Navigator    4/10/2023, 10:21 EDT

## 2023-04-17 ENCOUNTER — PATIENT OUTREACH (OUTPATIENT)
Dept: CALL CENTER | Facility: HOSPITAL | Age: 33
End: 2023-04-17
Payer: COMMERCIAL

## 2023-04-17 NOTE — OUTREACH NOTE
Mother[facility] Survey    Flowsheet Row Responses   [facility] facility patient discharged from? [facility]   Week 1 attempt successful? Yes   Call start time 1529   Call end time 1542   Baby sex Girl    discharged home with mother? Yes   Baby sex Girl   Delivery type    Emotional state Acceptance   Family support Yes   Do you have all necessary resources to care for you and your baby?  Yes   Have members of your household adjusted to your baby? Yes   Additional comments has 1 year old and 6 others   Did you have any problems with pre-eclampsia during this pregnancy? Yes   Do you have any of the following: Other  [treated for elevated BP's with Labetolol]   Did you have blood glucose issues during this pregnancy No   Lochia amount Light   Lochia per patient report Rubra   Did you have an episiotomy/tear/abdominal incision? Yes   Are you experiencing --  [incision healing well]   Feeding Method Combination   Frequency breastfeeds every other feeding   Duration 15- 30 minutes   Pumping Yes   Supplementing Breast Milk, Formula   Frequency 2x's/day with formula   Amount 2-4 oz either MBM or formula   Breast Condition No   Nipple Condition No   Nursing Interventions Lactation education provided   Signs baby is ready to eat Rooting, Crying, Finger sucking   Number of wet diapers x 24 hours 8-10   Last BM x 24 hours 6   Umbilical Cord No reported signs or symptoms   Where does the baby usually sleep? Bassinet   Are there stuffed animals, toys, pillows, quilts, blankets, wedges, positioners, bumpers or other loose bedding in the infant's sleeping environment? No   Does the baby ever share a sleep surface in a bed, couch, recliner or other? No   Are you and/or other caregivers smoking inside or outside the baby's home? No   Mom appointment comments: had PP f/u's   Baby appointment comments: Peds appt scheduled, has had wt check x1, is above BW   Additional comments eager to eat   Call completed? Yes   How  satisfied were you with the Motherhood Connection Program? 5            Maria Del Rosario WILSON - Registered Nurse

## 2023-04-18 PROBLEM — N93.9 EPISODE OF HEAVY VAGINAL BLEEDING: Status: RESOLVED | Noted: 2023-01-12 | Resolved: 2023-04-18

## 2023-04-18 PROBLEM — Z32.01 POSITIVE URINE PREGNANCY TEST: Status: RESOLVED | Noted: 2022-09-27 | Resolved: 2023-04-18

## 2023-04-18 PROBLEM — Z34.90 ENCOUNTER FOR INDUCTION OF LABOR: Status: RESOLVED | Noted: 2023-03-30 | Resolved: 2023-04-18

## 2023-04-19 ENCOUNTER — POSTPARTUM VISIT (OUTPATIENT)
Dept: OBSTETRICS AND GYNECOLOGY | Facility: CLINIC | Age: 33
End: 2023-04-19
Payer: COMMERCIAL

## 2023-04-19 VITALS
BODY MASS INDEX: 28.88 KG/M2 | HEIGHT: 63 IN | SYSTOLIC BLOOD PRESSURE: 110 MMHG | WEIGHT: 163 LBS | DIASTOLIC BLOOD PRESSURE: 74 MMHG

## 2023-04-19 DIAGNOSIS — Z98.891 S/P EMERGENCY CESAREAN SECTION: Primary | ICD-10-CM

## 2023-05-01 NOTE — PROGRESS NOTES
"Answers for HPI/ROS submitted by the patient on 4/15/2023  What is the primary reason for your visit?: High Blood Pressure    Postop CS check    S:  Doing well, no complaints. Denies pp depression    O:  /74   Ht 160 cm (62.99\")   Wt 73.9 kg (163 lb)   Breastfeeding No   BMI 28.88 kg/m²     Brief Urine Lab Results  (Last result in the past 365 days)      Color   Clarity   Blood   Leuk Est   Nitrite   Protein   CREAT   Urine HCG        03/30/23 1205             25.8               Exam neg: inc looks good.     A:  Doing well postpartum    P:  Return for postpartum final checkup .    Jarek Obrien MD  21:25 EDT  04/30/23        "

## 2023-05-10 ENCOUNTER — POSTPARTUM VISIT (OUTPATIENT)
Dept: OBSTETRICS AND GYNECOLOGY | Facility: CLINIC | Age: 33
End: 2023-05-10
Payer: COMMERCIAL

## 2023-05-10 VITALS
HEIGHT: 63 IN | BODY MASS INDEX: 29.48 KG/M2 | SYSTOLIC BLOOD PRESSURE: 112 MMHG | WEIGHT: 166.4 LBS | DIASTOLIC BLOOD PRESSURE: 76 MMHG

## 2023-05-10 DIAGNOSIS — Z98.891 S/P EMERGENCY CESAREAN SECTION: Primary | ICD-10-CM

## 2023-05-10 RX ORDER — CLINDAMYCIN PHOSPHATE 10 MG/ML
SOLUTION TOPICAL
COMMUNITY
Start: 2023-05-02

## 2023-05-10 RX ORDER — ACETAMINOPHEN AND CODEINE PHOSPHATE 120; 12 MG/5ML; MG/5ML
1 SOLUTION ORAL DAILY
Qty: 84 TABLET | Refills: 3 | Status: SHIPPED | OUTPATIENT
Start: 2023-05-10

## 2023-05-10 NOTE — PROGRESS NOTES
"FINAL POSTPARTUM VISIT.    S:  Doing well, no complaints.  PP depression?  no.  Breast or bottle?  bottle.  Contraception?  Requests micronor    O:  /76   Ht 160 cm (62.99\")   Wt 75.5 kg (166 lb 6.4 oz)   Breastfeeding No   BMI 29.48 kg/m²     Exam: inc looks good    A:  Doing well pp.  Smoker.  Counselled.     P:  Micronor.  RTO 1 yr.     Jarek Obrien MD  13:19 EDT  @today@  Answers for HPI/ROS submitted by the patient on 5/8/2023  Please describe your symptoms.: 6 week check up  Have you had these symptoms before?: Yes  How long have you been having these symptoms?: 1-4 days  What is the primary reason for your visit?: Other      "

## 2024-01-25 ENCOUNTER — OFFICE VISIT (OUTPATIENT)
Dept: OBSTETRICS AND GYNECOLOGY | Facility: CLINIC | Age: 34
End: 2024-01-25
Payer: COMMERCIAL

## 2024-01-25 VITALS
HEIGHT: 63 IN | BODY MASS INDEX: 28.53 KG/M2 | SYSTOLIC BLOOD PRESSURE: 120 MMHG | WEIGHT: 161 LBS | DIASTOLIC BLOOD PRESSURE: 74 MMHG

## 2024-01-25 DIAGNOSIS — Z64.1 GRAND MULTIPARA: ICD-10-CM

## 2024-01-25 DIAGNOSIS — O09.899 SHORT INTERVAL BETWEEN PREGNANCIES AFFECTING PREGNANCY, ANTEPARTUM: ICD-10-CM

## 2024-01-25 DIAGNOSIS — N92.6 MISSED MENSES: Primary | ICD-10-CM

## 2024-01-25 DIAGNOSIS — O21.9 NAUSEA AND VOMITING DURING PREGNANCY PRIOR TO 22 WEEKS GESTATION: ICD-10-CM

## 2024-01-25 PROBLEM — Z87.59 HISTORY OF POLYHYDRAMNIOS: Status: ACTIVE | Noted: 2024-01-25

## 2024-01-25 PROBLEM — O09.33 INSUFFICIENT PRENATAL CARE IN THIRD TRIMESTER: Status: RESOLVED | Noted: 2023-03-06 | Resolved: 2024-01-25

## 2024-01-25 LAB
B-HCG UR QL: POSITIVE
EXPIRATION DATE: ABNORMAL
INTERNAL NEGATIVE CONTROL: ABNORMAL
INTERNAL POSITIVE CONTROL: ABNORMAL
Lab: ABNORMAL

## 2024-01-25 RX ORDER — ONDANSETRON 4 MG/1
4 TABLET, ORALLY DISINTEGRATING ORAL EVERY 8 HOURS PRN
Qty: 30 TABLET | Refills: 2 | Status: SHIPPED | OUTPATIENT
Start: 2024-01-25

## 2024-01-25 NOTE — PROGRESS NOTES
Confirmation of pregnancy     Chief Complaint   Patient presents with    Amenorrhea         Raina Grigsby is being seen today for evaluation of absence of menses. Due to her period being late, she tested for pregnancy and had + home UPT. She is a 33 y.o. . This problem is new to me, the examiner.       OB History          12    Para   8    Term   8       0    AB   3    Living   8         SAB   3    IAB   0    Ectopic   0    Molar   0    Multiple   0    Live Births   8          Obstetric Comments   H/O PPROM @ 36 weeks with 5th pregnancy    x 7   Emergent C/S x 1               HPI     LNMP: ?  Confident with date: No; was on the pill and getting cycles every 3 months  Taking prenatal vitamins: No. Needs RX: No  Planned pregnancy: No  Prior obstetric issues, potential pregnancy concerns: placental abruption with recent delivery 3/2023; s/p emergent C/S; hx of pre-eclampsia with all pregnancies, hx of PPROM at 36wga  Family history of genetic issues (includes FOB): no  Varicella Hx: yes  Flu vaccine: no  COVID 19 vaccine: no. Booster vaccine: no  History of STDs: no  Current medications: no  Last pap smear: 10/2022- NIL  Smoker: Yes; smoke 0.5 ppd  Drug or alcohol abuse: Yes; THC use at New Year's; stopped since she found out she was pregnanct  H/O physical, emotional or sexual abuse: no  H/O mental health disorder: no  Prior testing for Cystic Fibrosis Carrier or Sickle Cell Trait- ?   Prepregnancy BMI - Body mass index is 28.53 kg/m².    Past Medical History:   Diagnosis Date    Gestational hypertension 3/30/2023    Hidradenitis        Past Surgical History:   Procedure Laterality Date     SECTION N/A 3/30/2023    Procedure:  SECTION PRIMARY;  Surgeon: Ester Hudson MD;  Location: [facility] LABOR DELIVERY;  Service: Obstetrics/Gynecology;  Laterality: N/A;    GALLBLADDER SURGERY      TONSILLECTOMY      WISDOM TOOTH EXTRACTION           Current Outpatient  "Medications:     ondansetron ODT (ZOFRAN-ODT) 4 MG disintegrating tablet, Place 1 tablet on the tongue Every 8 (Eight) Hours As Needed for Nausea or Vomiting., Disp: 30 tablet, Rfl: 2    Allergies   Allergen Reactions    Egg White (Egg Protein) Anaphylaxis    Ibuprofen Hives    Peanut (Diagnostic) Anaphylaxis    Lactose Intolerance [Tilactase] Diarrhea     Pt says she can eat cheese       Social History     Socioeconomic History    Marital status: Single   Tobacco Use    Smoking status: Every Day     Packs/day: .5     Types: Cigarettes    Smokeless tobacco: Never   Vaping Use    Vaping Use: Never used   Substance and Sexual Activity    Alcohol use: Never    Drug use: Yes     Types: Marijuana    Sexual activity: Yes     Partners: Male       Family History   Problem Relation Age of Onset    Alcohol abuse Mother     Hypertension Father     COPD Father     Diabetes Father         Recently diagnosed    Breast cancer Maternal Grandmother     Diabetes Paternal Grandmother     Prostate cancer Paternal Grandfather        Review of systems     Review of Systems   Gastrointestinal:  Positive for nausea and vomiting.   Genitourinary:  Positive for menstrual problem. Negative for pelvic pain, pelvic pressure and vaginal bleeding.         Objective    /74   Ht 160 cm (62.99\")   Wt 73 kg (161 lb)   LMP  (LMP Unknown)   BMI 28.53 kg/m²     Physical Exam  Vitals reviewed.   Constitutional:       General: She is awake. She is not in acute distress.     Appearance: She is not ill-appearing.   Eyes:      Conjunctiva/sclera: Conjunctivae normal.   Pulmonary:      Effort: Pulmonary effort is normal. No respiratory distress.   Musculoskeletal:      Cervical back: Neck supple. No rigidity.   Skin:     General: Skin is warm and dry.      Capillary Refill: Capillary refill takes less than 2 seconds.   Neurological:      Mental Status: She is alert and oriented to person, place, and time.   Psychiatric:         Mood and Affect: Mood " and affect normal.         Behavior: Behavior normal.         Assessment/Plan      Missed menses: + UPT in office. LNMP ? = US confirms IUP with +FCA: Yes. IMP: single, viable, IUP seen lasha 13w4d.  bpm.  Both OV WNL. Oriented to practice.     Pregnancy: Disc importance of regular prenatal care. Enc PNV daily. Counseled on providers and on call phone. Disc Tylenol products are ok and encouraged no ibuprofen or ASA in pregnancy.  Disc exercise in pregnancy, diet, expected weight gain, etc. Enc no use of tobacco, vaping, drugs, or alcohol during pregnancy. Rev warn s/s of SAB.     Labs: Pt counseled on genetic screening, Quad screen, AFP, and NIPS.     Body mass index is 28.53 kg/m². Overweight women, with a BMI of 25-29, should gain approx 15-25 pounds for the entire pregnancy.        Smoker- currently vapes; During this visit, approx 3-5 minutes counseling the patient regarding smoking cessation. PT COUNSELED TO QUIT SMOKING IN PREGNANCY.  She has been informed that cigarette smoking is associated with increased incidence of  birth, growth restriction, SIDS, et. Disc that smoking cessation is the single most important thing she can do to improve the pregnancy outcome.  She verbalized understanding to this discussion.  Declined RX; patches cause her to break out.     6.   COVID19 precautions were reviewed with the patient. Continue to encourage social distancing, wearing a mask, and good hand hygiene.  I wore a mask, protective eye wear, and gloves during this patient encounter.  Patient also wearing a surgical mask and social distancing was observed. Hand hygeine performed before and after seeing the patient. Also encouraged COVID booster vaccine at 6 month interval from last COVID vaccine.     7.  Flu vaccine. Encouraged the flu vaccine during pregnancy. Discuss normal physiological changes during pregnancy increase the susceptibility of the flu virus and increase the risk of severe illness for the  pregnant woman. Disc flu can be harmful to the unborn baby as well. Enc the flu vaccine. Disc with patient that getting the flu vaccine is the first and most important step in protecting against the flu. Flu vaccines given during pregnancy help protect both the mother and the baby. Getting the flu vaccine during pregnancy also helps protect the  from flu illness for several months after their birth, when then are too young to get vaccinated. Also disc the importance of good hand hygiene and avoiding people who are sick. The patient desires the flu vaccine at next appt.    8. Grand multipara- The patient understands that she is a grand multipara because she has given birth to 5 or more infants which increases her risk during pregnancy for factors including but not limited to abnormal fetal presentation, precipitate delivery, uterine atony, placenta previa, uterine rupture, postpartum hemorrhage, etc.     9.  Hx of pre-E and GHTN- needs baseline PIH labs with new OB; start baby asa    10. Hx of C/S    11. N/V- ERX Zofran    12. Hx of PPROM- at 36wga    13. Short interval between pregnancy- C/S 3/2023; check CL 14-16wga        All questions answered.     Counseling was given to patient for the following topics: diagnostic results, instructions for management, risk factor reductions, prognosis, patient and family education, impressions, risks and benefits of treatment options, and importance of treatment compliance . Total time of the encounter was 30 minutes and 30 minutes was spent counseling.  This time does not include time spent performing ultrasound.    Encounter Diagnoses   Name Primary?    Missed menses Yes    Nausea and vomiting during pregnancy prior to 22 weeks gestation     Grand multipara     Short interval between pregnancies affecting pregnancy, antepartum        Diagnoses and all orders for this visit:    Missed menses  -     POC Pregnancy, Urine    Nausea and vomiting during pregnancy prior to 22  weeks gestation  -     ondansetron ODT (ZOFRAN-ODT) 4 MG disintegrating tablet; Place 1 tablet on the tongue Every 8 (Eight) Hours As Needed for Nausea or Vomiting.    Grand multipara    Short interval between pregnancies affecting pregnancy, antepartum        RTO in 1 weeks for new OB exam/PAP, labs     Gauri Han, APRN  1/25/2024  12:33 EST

## 2024-02-01 ENCOUNTER — INITIAL PRENATAL (OUTPATIENT)
Dept: OBSTETRICS AND GYNECOLOGY | Facility: CLINIC | Age: 34
End: 2024-02-01
Payer: COMMERCIAL

## 2024-02-01 VITALS — WEIGHT: 167.2 LBS | BODY MASS INDEX: 29.63 KG/M2 | SYSTOLIC BLOOD PRESSURE: 110 MMHG | DIASTOLIC BLOOD PRESSURE: 64 MMHG

## 2024-02-01 DIAGNOSIS — Z34.00 INITIAL OBSTETRIC VISIT, ANTEPARTUM: Primary | ICD-10-CM

## 2024-02-01 DIAGNOSIS — F17.210 CIGARETTE SMOKER: ICD-10-CM

## 2024-02-01 DIAGNOSIS — O09.899 SHORT INTERVAL BETWEEN PREGNANCIES AFFECTING PREGNANCY, ANTEPARTUM: ICD-10-CM

## 2024-02-01 DIAGNOSIS — O09.299 HX OF PREECLAMPSIA, PRIOR PREGNANCY, CURRENTLY PREGNANT: ICD-10-CM

## 2024-02-01 DIAGNOSIS — Z87.59 HISTORY OF POLYHYDRAMNIOS: ICD-10-CM

## 2024-02-01 DIAGNOSIS — Z11.51 SCREENING FOR HUMAN PAPILLOMAVIRUS (HPV): ICD-10-CM

## 2024-02-01 DIAGNOSIS — Z98.891 S/P EMERGENCY CESAREAN SECTION: ICD-10-CM

## 2024-02-01 DIAGNOSIS — Z30.2 REQUEST FOR STERILIZATION: ICD-10-CM

## 2024-02-01 DIAGNOSIS — Z87.59 HISTORY OF PRETERM PREMATURE RUPTURE OF MEMBRANES (PPROM): ICD-10-CM

## 2024-02-01 DIAGNOSIS — J01.00 ACUTE MAXILLARY SINUSITIS, RECURRENCE NOT SPECIFIED: ICD-10-CM

## 2024-02-01 DIAGNOSIS — Z87.59 H/O RAPID LABOR: ICD-10-CM

## 2024-02-01 DIAGNOSIS — Z64.1 GRAND MULTIPARA: ICD-10-CM

## 2024-02-01 DIAGNOSIS — Z36.9 ENCOUNTER FOR ANTENATAL SCREENING, UNSPECIFIED: ICD-10-CM

## 2024-02-01 PROBLEM — O13.9 GESTATIONAL HYPERTENSION: Status: RESOLVED | Noted: 2023-03-30 | Resolved: 2024-02-01

## 2024-02-01 LAB
BILIRUB BLD-MCNC: NEGATIVE MG/DL
CLARITY, POC: CLEAR
COLOR UR: YELLOW
GLUCOSE UR STRIP-MCNC: NEGATIVE MG/DL
KETONES UR QL: NEGATIVE
LEUKOCYTE EST, POC: NEGATIVE
NITRITE UR-MCNC: NEGATIVE MG/ML
PH UR: 5 [PH] (ref 5–8)
PROT UR STRIP-MCNC: NEGATIVE MG/DL
RBC # UR STRIP: NEGATIVE /UL
SP GR UR: 1 (ref 1–1.03)
UROBILINOGEN UR QL: NORMAL

## 2024-02-01 RX ORDER — AZITHROMYCIN 250 MG/1
TABLET, FILM COATED ORAL
Qty: 6 TABLET | Refills: 0 | Status: SHIPPED | OUTPATIENT
Start: 2024-02-01

## 2024-02-01 NOTE — PROGRESS NOTES
Initial OB Visit     Chief Complaint   Patient presents with    Initial Prenatal Visit       Raina Grigsby is being seen today for her first obstetrical visit.  She is a 33 y.o.    14w4d gestation. This problem is new to me: no      OB History          12    Para   8    Term   8       0    AB   3    Living   8         SAB   3    IAB   0    Ectopic   0    Molar   0    Multiple   0    Live Births   8          Obstetric Comments   H/O PPROM @ 36 weeks with 5th pregnancy    x 7 - proven pelvis 6#13oz  Emergent C/S x 1               LNMP: ?  Confident with date: No; was on the pill and getting cycles every 3 months   Taking prenatal vitamins: Yes  Planned pregnancy: No  Prior obstetric issues, potential pregnancy concerns:  placental abruption with recent delivery 3/2023; s/p emergent C/S; hx of pre-eclampsia with all pregnancies, hx of PPROM at 36wga   Family history of genetic issues (includes FOB): no  Prior infections concerning in pregnancy (Rash, fever in last 2 weeks): no  Varicella Hx: yes  Flu vaccine: no; desires today  COVID Vaccine: no  History of STDs: no  Current medications: PNV, Zofran  Last pap smear: 10/2022- NIL  Smoker: Yes; 0.5ppd  Drug or alcohol abuse: Yes; THC use at New Year's; stopped since she found out she was pregnant   H/O Physical, mental or sexual abuse: no  H/O mental health disorder: no  Prior testing for Cystic Fibrosis Carrier or Sickle Cell Trait- no  Prepregnancy BMI: Body mass index is 29.63 kg/m².      Past Medical History:   Diagnosis Date    Gestational hypertension 3/30/2023    Hidradenitis        Past Surgical History:   Procedure Laterality Date     SECTION N/A 3/30/2023    Procedure:  SECTION PRIMARY;  Surgeon: Ester Hudson MD;  Location: [facility] LABOR DELIVERY;  Service: Obstetrics/Gynecology;  Laterality: N/A;    GALLBLADDER SURGERY      TONSILLECTOMY      WISDOM TOOTH EXTRACTION           Current Outpatient  Medications:     azithromycin (ZITHROMAX) 250 MG tablet, Take 2 tablets the first day, then 1 tablet daily for 4 days., Disp: 6 tablet, Rfl: 0    ondansetron ODT (ZOFRAN-ODT) 4 MG disintegrating tablet, Place 1 tablet on the tongue Every 8 (Eight) Hours As Needed for Nausea or Vomiting., Disp: 30 tablet, Rfl: 2    Allergies   Allergen Reactions    Egg White (Egg Protein) Anaphylaxis    Ibuprofen Hives    Peanut (Diagnostic) Anaphylaxis    Lactose Intolerance [Tilactase] Diarrhea     Pt says she can eat cheese       Social History     Socioeconomic History    Marital status: Single   Tobacco Use    Smoking status: Every Day     Packs/day: .5     Types: Cigarettes    Smokeless tobacco: Never   Vaping Use    Vaping Use: Never used   Substance and Sexual Activity    Alcohol use: Never    Drug use: Yes     Types: Marijuana    Sexual activity: Yes     Partners: Male       Family History   Problem Relation Age of Onset    Alcohol abuse Mother     Hypertension Father     COPD Father     Diabetes Father         Recently diagnosed    Breast cancer Maternal Grandmother     Diabetes Paternal Grandmother     Prostate cancer Paternal Grandfather        Review of systems     All other systems reviewed and are negative except for: Ears, nose, mouth, throat, and face: positive for nasal congestion and sore throat  Gastrointestinal: positive for nausea and vomiting     Objective    /64   Wt 75.8 kg (167 lb 3.2 oz)   LMP  (LMP Unknown)   BMI 29.63 kg/m²     General Appearance:    Alert, cooperative, in no acute distress, habitus overweight   Head:    Normocephalic, without obvious abnormality, atraumatic   Neck:   No adenopathy, supple, trachea midline, no thyromegaly   Lungs:     Clear to auscultation,respirations regular, even and     unlabored    Heart:    Regular rhythm and normal rate, normal S1 and S2, no       murmur, no gallop, no rub, no click   Breast Exam:   Deferred   Abdomen:     Normal bowel sounds, no masses,  no organomegaly, soft,    non-tender, non-distended, no guarding, no rebound                tenderness   Genitalia:    Vulva - BUS-WNL, NEFG    Vagina - No discharge, No bleeding    Cervix - No Lesions, closed     Uterus - Consistent with 14 weeks    Adnexa - No mass, NT    Pelvimetry - clinically adequate, gynecoid pelvis     Extremities:   Moves all extremities well, no edema, no cyanosis, no        redness   Skin:   No bleeding, bruising or rash   Lymph nodes:   No palpable adenopathy   Neurologic:   Sensation intact, A&O times 3         Assessment/Plan    1) Pregnancy at 14w4d-  bpm.  EDC established 7/28/2024 and confirmed by US.     2) OB exam: OB exam completed: Yes. New OB bag provided Yes. Pap collected: Yes. Provided list of safe medications to take while in pregnancy.    3) Labs: OB labs collected: Yes. Counseled on CF/SMA/FX: Yes; she desires.  Counseled on Quad screen/MT21: Yes, she desires today. Counseled on AFP: No, she is too early.    4) Body mass index is 29.63 kg/m². Overweight women, with a BMI of 25-29, should gain approx 15-25 pounds for the entire pregnancy.         5)  Prenatal care: Oriented to the office and prenatal care. Encourage prenatal vitamins. Disc Tylenol products are fine, avoid aspirin and ibuprofen; Zika (travel restrictions/ok to use insect repellant); not to change cat litter; food restrictions; exercise; avoidance of alcohol, tobacco, drugs and saunas/hot tubs.     6) COVID19 precautions were reviewed with the patient. Continue to encourage social distancing, wearing a mask, and good hand hygiene.  Hand hygeine performed before and after seeing the patient.     7) desires flu vaccine- out of stock; will get at her next appt    8) Smoker- currently vapes; During this visit, approx 3-5 minutes counseling the patient regarding smoking cessation. PT COUNSELED TO QUIT SMOKING IN PREGNANCY.  She has been informed that cigarette smoking is associated with increased incidence  of  birth, growth restriction, SIDS, et. Disc that smoking cessation is the single most important thing she can do to improve the pregnancy outcome.  She verbalized understanding to this discussion.  Declined RX; patches cause her to break out.     9) Grand multipara- The patient understands that she is a grand multipara because she has given birth to 5 or more infants which increases her risk during pregnancy for factors including but not limited to abnormal fetal presentation, precipitate delivery, uterine atony, placenta previa, uterine rupture, postpartum hemorrhage, etc.      10)  Hx of pre-E and GHTN- baseline PIH labs with new OB today; start baby asa     11)  Hx of C/S- emergent due to placental abruption; desires repeat and wants tubal    12)  Desires sterilization- signs paper at 28 wga     13) N/V- improving; has Zofran at home if needed     14) H/O PPROM @ 36 weeks with 5th child- Did not do [place] or vaginal progesterone with consecutive pregnancies      15) Short interval between pregnancy- C/S 3/2023; check CL 14-16wga    16)  Sinus infection- [facility]    All questions answered.     I spent 30 minutes caring for Raina on this date of service. This time includes time spent by me in the following activities: preparing for the visit, reviewing tests, obtaining and/or reviewing a separately obtained history, performing a medically appropriate examination and/or evaluation, counseling and educating the patient/family/caregiver, ordering medications, tests, or procedures, and documenting information in the medical record.      RTO 2 weeks for OBT, AFP, US for CL, flu vaccine    Parts of this document have been copied or forwarded from her previous visits and have been reviewed, updated and edited as indicated.      Gauri Han, APRN    2024  11:07 EST

## 2024-02-03 LAB
ABO GROUP BLD: NORMAL
BASOPHILS # BLD AUTO: 0 X10E3/UL (ref 0–0.2)
BASOPHILS NFR BLD AUTO: 0 %
BLD GP AB SCN SERPL QL: NEGATIVE
EOSINOPHIL # BLD AUTO: 0.3 X10E3/UL (ref 0–0.4)
EOSINOPHIL NFR BLD AUTO: 2 %
ERYTHROCYTE [DISTWIDTH] IN BLOOD BY AUTOMATED COUNT: 12.6 % (ref 11.7–15.4)
HBA1C MFR BLD: 5.4 % (ref 4.8–5.6)
HBV SURFACE AG SERPL QL IA: NEGATIVE
HCT VFR BLD AUTO: 34.8 % (ref 34–46.6)
HCV IGG SERPL QL IA: NON REACTIVE
HGB A MFR BLD ELPH: 97.5 % (ref 96.4–98.8)
HGB A2 MFR BLD ELPH: 2.5 % (ref 1.8–3.2)
HGB BLD-MCNC: 11.8 G/DL (ref 11.1–15.9)
HGB F MFR BLD ELPH: 0 % (ref 0–2)
HGB FRACT BLD-IMP: NORMAL
HGB S MFR BLD ELPH: 0 %
HIV 1+2 AB+HIV1 P24 AG SERPL QL IA: NON REACTIVE
IMM GRANULOCYTES # BLD AUTO: 0 X10E3/UL (ref 0–0.1)
IMM GRANULOCYTES NFR BLD AUTO: 0 %
LYMPHOCYTES # BLD AUTO: 2.6 X10E3/UL (ref 0.7–3.1)
LYMPHOCYTES NFR BLD AUTO: 22 %
MCH RBC QN AUTO: 31.2 PG (ref 26.6–33)
MCHC RBC AUTO-ENTMCNC: 33.9 G/DL (ref 31.5–35.7)
MCV RBC AUTO: 92 FL (ref 79–97)
MONOCYTES # BLD AUTO: 0.4 X10E3/UL (ref 0.1–0.9)
MONOCYTES NFR BLD AUTO: 4 %
NEUTROPHILS # BLD AUTO: 8.5 X10E3/UL (ref 1.4–7)
NEUTROPHILS NFR BLD AUTO: 72 %
PLATELET # BLD AUTO: 400 X10E3/UL (ref 150–450)
RBC # BLD AUTO: 3.78 X10E6/UL (ref 3.77–5.28)
RH BLD: POSITIVE
RPR SER QL: NON REACTIVE
RUBV IGG SERPL IA-ACNC: 1.57 INDEX
VZV IGG SER IA-ACNC: 183 INDEX
WBC # BLD AUTO: 11.8 X10E3/UL (ref 3.4–10.8)

## 2024-02-04 RX ORDER — FERROUS GLUCONATE 324(38)MG
324 TABLET ORAL
Qty: 100 TABLET | Refills: 2 | Status: SHIPPED | OUTPATIENT
Start: 2024-02-04

## 2024-02-05 LAB — INFORMED CONSENT NEEDED: NORMAL

## 2024-02-06 LAB
AMPHETAMINES UR QL SCN: NEGATIVE NG/ML
BACTERIA UR CULT: ABNORMAL
BACTERIA UR CULT: ABNORMAL
BARBITURATES UR QL SCN: NEGATIVE NG/ML
BENZODIAZ UR QL SCN: NEGATIVE NG/ML
BUPRENORPHINE UR QL: NEGATIVE NG/ML
BZE UR QL SCN: NEGATIVE NG/ML
C TRACH RRNA CVX QL NAA+PROBE: NEGATIVE
CANNABINOIDS UR QL SCN: POSITIVE NG/ML
CFDNA.FET/CFDNA.TOTAL SFR FETUS: NORMAL %
CITATION REF LAB TEST: NORMAL
CREAT UR-MCNC: 122.9 MG/DL (ref 20–300)
CYTOLOGIST CVX/VAG CYTO: NORMAL
CYTOLOGY CVX/VAG DOC CYTO: NORMAL
CYTOLOGY CVX/VAG DOC THIN PREP: NORMAL
DX ICD CODE: NORMAL
FENTANYL UR-MCNC: NEGATIVE PG/ML
FET 13+18+21+X+Y ANEUP PLAS.CFDNA: NEGATIVE
FET CHR 21 TS PLAS.CFDNA QL: NEGATIVE
FET SEX PLAS.CFDNA DOSAGE CFDNA: NORMAL
FET TS 13 RISK PLAS.CFDNA QL: NEGATIVE
FET TS 18 RISK WBC.DNA+CFDNA QL: NEGATIVE
GA EST FROM CONCEPTION DATE: NORMAL D
GESTATIONAL AGE > 9:: YES
HIV 1 & 2 AB SER-IMP: NORMAL
HPV GENOTYPE REFLEX: NORMAL
HPV I/H RISK 4 DNA CVX QL PROBE+SIG AMP: NEGATIVE
LAB DIRECTOR NAME PROVIDER: NORMAL
LAB DIRECTOR NAME PROVIDER: NORMAL
LABORATORY COMMENT REPORT: ABNORMAL
LABORATORY COMMENT REPORT: NORMAL
LIMITATIONS OF THE TEST: NORMAL
Lab: NORMAL
MEPERIDINE UR QL: NEGATIVE NG/ML
METHADONE UR QL SCN: NEGATIVE NG/ML
N GONORRHOEA RRNA CVX QL NAA+PROBE: NEGATIVE
NEGATIVE PREDICTIVE VALUE: NORMAL
NOTE: NORMAL
OPIATES UR QL SCN: NEGATIVE NG/ML
OTHER ANTIBIOTIC SUSC ISLT: ABNORMAL
OTHER STN SPEC: NORMAL
OXYCODONE+OXYMORPHONE UR QL SCN: NEGATIVE NG/ML
PCP UR QL: NEGATIVE NG/ML
PERFORMANCE CHARACTERISTICS: NORMAL
PH UR: 8.5 [PH] (ref 4.5–8.9)
POSITIVE PREDICTIVE VALUE: NORMAL
PROPOXYPH UR QL SCN: NEGATIVE NG/ML
REF LAB TEST METHOD: NORMAL
STAT OF ADQ CVX/VAG CYTO-IMP: NORMAL
T VAGINALIS RRNA SPEC QL NAA+PROBE: NEGATIVE
TEST PERFORMANCE INFO SPEC: NORMAL
TRAMADOL UR QL SCN: NEGATIVE NG/ML

## 2024-02-07 DIAGNOSIS — O23.40 URINARY TRACT INFECTION IN MOTHER DURING PREGNANCY, ANTEPARTUM: Primary | ICD-10-CM

## 2024-02-07 PROBLEM — R82.5 POSITIVE URINE DRUG SCREEN: Status: ACTIVE | Noted: 2024-02-07

## 2024-02-07 RX ORDER — NITROFURANTOIN 25; 75 MG/1; MG/1
100 CAPSULE ORAL 2 TIMES DAILY
Qty: 14 CAPSULE | Refills: 0 | Status: SHIPPED | OUTPATIENT
Start: 2024-02-07 | End: 2024-02-14

## 2024-02-14 LAB
CITATION REF LAB TEST: NORMAL
GENDER IDENTITY: NORMAL
GENE DIS ANL CARRIER INTERP-IMP: NORMAL
GENE STUDIED ID: NORMAL
GENETIC SCN SPEC: NORMAL
LAB DIRECTOR NAME PROVIDER: NORMAL
Lab: NORMAL
REASON FOR REFERRAL (NARRATIVE): NORMAL
RECOMMENDATION PATIENT DOC-IMP: NORMAL
REF LAB TEST METHOD: NORMAL
SERVICE CMNT-IMP: NORMAL
SPECIMEN SOURCE: NORMAL

## 2024-02-16 ENCOUNTER — ROUTINE PRENATAL (OUTPATIENT)
Dept: OBSTETRICS AND GYNECOLOGY | Facility: CLINIC | Age: 34
End: 2024-02-16
Payer: COMMERCIAL

## 2024-02-16 VITALS — DIASTOLIC BLOOD PRESSURE: 60 MMHG | BODY MASS INDEX: 28.71 KG/M2 | SYSTOLIC BLOOD PRESSURE: 122 MMHG | WEIGHT: 162 LBS

## 2024-02-16 DIAGNOSIS — Z36.9 ENCOUNTER FOR ANTENATAL SCREENING, UNSPECIFIED: Primary | ICD-10-CM

## 2024-02-16 DIAGNOSIS — Z34.92 PRENATAL CARE IN SECOND TRIMESTER: ICD-10-CM

## 2024-02-16 LAB
ALBUMIN SERPL-MCNC: 3.8 G/DL (ref 3.5–5.2)
ALBUMIN/GLOB SERPL: 1.8 G/DL
ALP SERPL-CCNC: 68 U/L (ref 39–117)
ALT SERPL-CCNC: 11 U/L (ref 1–33)
AST SERPL-CCNC: 12 U/L (ref 1–32)
BILIRUB BLD-MCNC: NEGATIVE MG/DL
BILIRUB SERPL-MCNC: <0.2 MG/DL (ref 0–1.2)
BUN SERPL-MCNC: 5 MG/DL (ref 6–20)
BUN/CREAT SERPL: 11.9 (ref 7–25)
CALCIUM SERPL-MCNC: 8.8 MG/DL (ref 8.6–10.5)
CHLORIDE SERPL-SCNC: 104 MMOL/L (ref 98–107)
CLARITY, POC: CLEAR
CO2 SERPL-SCNC: 21.7 MMOL/L (ref 22–29)
COLOR UR: YELLOW
CREAT SERPL-MCNC: 0.42 MG/DL (ref 0.57–1)
EGFRCR SERPLBLD CKD-EPI 2021: 133.5 ML/MIN/1.73
EXTERNAL CYSTIC FIBROSIS: NORMAL
EXTERNAL NIPT: NORMAL
GLOBULIN SER CALC-MCNC: 2.1 GM/DL
GLUCOSE SERPL-MCNC: 81 MG/DL (ref 65–99)
GLUCOSE UR STRIP-MCNC: NEGATIVE MG/DL
KETONES UR QL: NEGATIVE
LEUKOCYTE EST, POC: NEGATIVE
NITRITE UR-MCNC: NEGATIVE MG/ML
PH UR: 5 [PH] (ref 5–8)
POTASSIUM SERPL-SCNC: 4.1 MMOL/L (ref 3.5–5.2)
PROT SERPL-MCNC: 5.9 G/DL (ref 6–8.5)
PROT UR STRIP-MCNC: NEGATIVE MG/DL
RBC # UR STRIP: NEGATIVE /UL
SODIUM SERPL-SCNC: 137 MMOL/L (ref 136–145)
SP GR UR: 1 (ref 1–1.03)
UROBILINOGEN UR QL: NORMAL

## 2024-02-16 RX ORDER — ASPIRIN 81 MG/1
81 TABLET, CHEWABLE ORAL DAILY
Qty: 30 TABLET | Refills: 11 | Status: SHIPPED | OUTPATIENT
Start: 2024-02-16

## 2024-02-16 RX ORDER — PRENATAL VIT/IRON FUM/FOLIC AC 27MG-0.8MG
TABLET ORAL DAILY
COMMUNITY

## 2024-02-16 NOTE — PROGRESS NOTES
OB follow up     CC:  Here for prenatal follow up    Raina Grigsby is a 33 y.o.  16w5d being seen today for her obstetrical visit.  Patient reports no complaints. Taking +PNV.  She did not take her antibx for UTI d/t vomiting.     Review of Systems    Genitourinary: Neg for cramping, vaginal bleeding, SROM, or dysuria.       /60   Wt 73.5 kg (162 lb)   LMP  (LMP Unknown)   BMI 28.71 kg/m²     FHT: 130s  BPM   Uterine Size: size equals dates   Assessment    1) Pregnancy at 16w5d- Desires AFP today.  US IMP: VTX.  bpm. CL 4.0cm. Male.     2) T21 neg. CF/SMA/FX neg.     3) Smoker- Vapes.     4) UDS + for THC    5) UTI x 1- Check Urine cx today.     6) Grand multipara- The patient understands that she is a grand multipara because she has given birth to 5 or more infants which increases her risk during pregnancy for factors including but not limited to abnormal fetal presentation, precipitate delivery, uterine atony, placenta previa, uterine rupture, postpartum hemorrhage, etc.     7)  Hx of pre-E and GHTN- Is not taking baby ASA, erx sent.  Needs CMP and 24 hr urine.      8)  Hx of C/S- emergent due to placental abruption; desires repeat and wants tubal     9)  Desires sterilization- signs paper at 28 wga     10) N/V- improving; has Zofran at home if needed     11) H/O PPROM @ 36 weeks with 5th child- Did not do [place] or vaginal progesterone with consecutive pregnancies      12) Short interval between pregnancy- C/S 3/2023;  CL 4.0cm     13) Flu vaccine- Desires but out of stock in office.     Plan    Continue prenatal vitamins   Reviewed this stage of pregnancy  Problem list updated   Follow up in 3 weeks for OB tummy and anatomy US    Kylah Smith, APRN  2024  10:20 EST

## 2024-02-18 LAB
AFP INTERP SERPL-IMP: NORMAL
AFP INTERP SERPL-IMP: NORMAL
AFP MOM SERPL: 0.79
AFP SERPL-MCNC: 27 NG/ML
AGE AT DELIVERY: 32.7 YR
GA METHOD: NORMAL
GA: 16.5 WEEKS
IDDM PATIENT QL: NO
LABORATORY COMMENT REPORT: NORMAL
MULTIPLE PREGNANCY: NO
NEURAL TUBE DEFECT RISK FETUS: NORMAL %
RESULT: NORMAL

## 2024-03-07 ENCOUNTER — ROUTINE PRENATAL (OUTPATIENT)
Dept: OBSTETRICS AND GYNECOLOGY | Facility: CLINIC | Age: 34
End: 2024-03-07
Payer: COMMERCIAL

## 2024-03-07 VITALS — BODY MASS INDEX: 29.77 KG/M2 | WEIGHT: 168 LBS | DIASTOLIC BLOOD PRESSURE: 64 MMHG | SYSTOLIC BLOOD PRESSURE: 112 MMHG

## 2024-03-07 DIAGNOSIS — O09.899 SHORT INTERVAL BETWEEN PREGNANCIES AFFECTING PREGNANCY, ANTEPARTUM: ICD-10-CM

## 2024-03-07 DIAGNOSIS — R00.0 TACHYCARDIA, UNSPECIFIED: ICD-10-CM

## 2024-03-07 DIAGNOSIS — Z98.891 S/P EMERGENCY CESAREAN SECTION: ICD-10-CM

## 2024-03-07 DIAGNOSIS — F17.210 CIGARETTE SMOKER: ICD-10-CM

## 2024-03-07 DIAGNOSIS — Z36.9 ENCOUNTER FOR ANTENATAL SCREENING, UNSPECIFIED: ICD-10-CM

## 2024-03-07 DIAGNOSIS — Z87.59 HISTORY OF POLYHYDRAMNIOS: ICD-10-CM

## 2024-03-07 DIAGNOSIS — O09.299 HX OF PREECLAMPSIA, PRIOR PREGNANCY, CURRENTLY PREGNANT: ICD-10-CM

## 2024-03-07 DIAGNOSIS — Z30.2 REQUEST FOR STERILIZATION: ICD-10-CM

## 2024-03-07 DIAGNOSIS — Z34.92 PRENATAL CARE IN SECOND TRIMESTER: Primary | ICD-10-CM

## 2024-03-07 DIAGNOSIS — O23.40 URINARY TRACT INFECTION IN MOTHER DURING PREGNANCY, ANTEPARTUM: ICD-10-CM

## 2024-03-07 DIAGNOSIS — R82.5 POSITIVE URINE DRUG SCREEN: ICD-10-CM

## 2024-03-07 DIAGNOSIS — Z87.59 HISTORY OF PRETERM PREMATURE RUPTURE OF MEMBRANES (PPROM): ICD-10-CM

## 2024-03-07 DIAGNOSIS — Z64.1 GRAND MULTIPARA: ICD-10-CM

## 2024-03-07 DIAGNOSIS — O21.9 NAUSEA AND VOMITING DURING PREGNANCY PRIOR TO 22 WEEKS GESTATION: ICD-10-CM

## 2024-03-07 LAB
BILIRUB BLD-MCNC: NEGATIVE MG/DL
CLARITY, POC: CLEAR
COLOR UR: YELLOW
GLUCOSE UR STRIP-MCNC: NEGATIVE MG/DL
KETONES UR QL: NEGATIVE
LEUKOCYTE EST, POC: NEGATIVE
NITRITE UR-MCNC: NEGATIVE MG/ML
PH UR: 5 [PH] (ref 5–8)
PROT UR STRIP-MCNC: NEGATIVE MG/DL
RBC # UR STRIP: NEGATIVE /UL
SP GR UR: 1.02 (ref 1–1.03)
UROBILINOGEN UR QL: NORMAL

## 2024-03-07 RX ORDER — ASPIRIN 81 MG/1
81 TABLET, CHEWABLE ORAL DAILY
Qty: 30 TABLET | Refills: 11 | Status: SHIPPED | OUTPATIENT
Start: 2024-03-07

## 2024-03-07 RX ORDER — ONDANSETRON 4 MG/1
4 TABLET, ORALLY DISINTEGRATING ORAL EVERY 8 HOURS PRN
Qty: 30 TABLET | Refills: 2 | Status: SHIPPED | OUTPATIENT
Start: 2024-03-07

## 2024-03-07 NOTE — PROGRESS NOTES
OB follow up < 20 weeks    CC:  Here for prenatal follow up    Raina Grigsby is a 33 y.o.  19w4d being seen today for her obstetrical visit.  Patient reports nausea and elevated HR with occasional arrhythmia . Taking +PNV.  Needs RF of Zofran.  FM+    Review of Systems  Genitourinary: Neg for cramping, vaginal bleeding, SROM, or dysuria.     /64   Wt 76.2 kg (168 lb)   LMP  (LMP Unknown)   BMI 29.77 kg/m²     FHT: 131  BPM   Uterine Size:    Assessment    1) Pregnancy at 19w4d- fetal anatomy WNL    2) T21 neg. CF/SMA/FX neg.  AFP neg    3) Smoker- Vapes.     4) UDS + for THC    5) UTI x 1- unable to finish all abx; Check Urine cx today.     6) Grand multipara- The patient understands that she is a grand multipara because she has given birth to 5 or more infants which increases her risk during pregnancy for factors including but not limited to abnormal fetal presentation, precipitate delivery, uterine atony, placenta previa, uterine rupture, postpartum hemorrhage, etc.     7)  Hx of pre-E and GHTN- Is not taking baby ASA, erx sent.  CMP WNL; needs to drop off 24 hr urine.      8)  Hx of C/S- emergent due to placental abruption; desires repeat and wants tubal     9)  Desires sterilization- signs paper at 28 wga     10) N/V- improving; ERX Zofran     11) H/O PPROM @ 36 weeks with 5th child- Did not do [place] or vaginal progesterone with consecutive pregnancies      12) Short interval between pregnancy- C/S 3/2023; CL 4.0cm @ 16wga    13) Flu vaccine- Desires but out of stock in office.     14) tachycardia- notices on watch her HR increases to 120's several times a day; occasional nausea with heart skipping a beat; check Thyroid panel/TPO and EKG      Plan    Continue prenatal vitamins   Reviewed this stage of pregnancy  Problem list updated   Follow up in 4 weeks for OB tummy    Parts of this document have been copied or forwarded from her previous visits and have been reviewed, updated and edited as  indicated.      Gauri Han, APRN  3/7/2024  13:53 EST

## 2024-03-08 LAB
T3 SERPL-MCNC: 201 NG/DL (ref 80–200)
T4 FREE SERPL-MCNC: 0.88 NG/DL (ref 0.93–1.7)
THYROPEROXIDASE AB SERPL-ACNC: <9 IU/ML (ref 0–34)
TSH SERPL DL<=0.005 MIU/L-ACNC: 1.17 UIU/ML (ref 0.27–4.2)

## 2024-03-09 LAB
BACTERIA UR CULT: NORMAL
BACTERIA UR CULT: NORMAL

## 2024-04-15 ENCOUNTER — ROUTINE PRENATAL (OUTPATIENT)
Dept: OBSTETRICS AND GYNECOLOGY | Facility: CLINIC | Age: 34
End: 2024-04-15
Payer: COMMERCIAL

## 2024-04-15 VITALS — DIASTOLIC BLOOD PRESSURE: 62 MMHG | WEIGHT: 167.2 LBS | BODY MASS INDEX: 29.63 KG/M2 | SYSTOLIC BLOOD PRESSURE: 118 MMHG

## 2024-04-15 DIAGNOSIS — O09.899 SHORT INTERVAL BETWEEN PREGNANCIES AFFECTING PREGNANCY, ANTEPARTUM: ICD-10-CM

## 2024-04-15 DIAGNOSIS — O23.40 URINARY TRACT INFECTION IN MOTHER DURING PREGNANCY, ANTEPARTUM: ICD-10-CM

## 2024-04-15 DIAGNOSIS — Z64.1 GRAND MULTIPARA: ICD-10-CM

## 2024-04-15 DIAGNOSIS — Z36.9 ENCOUNTER FOR ANTENATAL SCREENING, UNSPECIFIED: Primary | ICD-10-CM

## 2024-04-15 DIAGNOSIS — O21.9 NAUSEA AND VOMITING DURING PREGNANCY PRIOR TO 22 WEEKS GESTATION: ICD-10-CM

## 2024-04-15 DIAGNOSIS — Z98.891 S/P EMERGENCY CESAREAN SECTION: ICD-10-CM

## 2024-04-15 DIAGNOSIS — O09.299 HX OF PREECLAMPSIA, PRIOR PREGNANCY, CURRENTLY PREGNANT: ICD-10-CM

## 2024-04-15 DIAGNOSIS — F17.210 CIGARETTE SMOKER: ICD-10-CM

## 2024-04-15 DIAGNOSIS — Z30.2 REQUEST FOR STERILIZATION: ICD-10-CM

## 2024-04-15 DIAGNOSIS — Z87.59 HISTORY OF PRETERM PREMATURE RUPTURE OF MEMBRANES (PPROM): ICD-10-CM

## 2024-04-15 DIAGNOSIS — Z87.59 HISTORY OF POLYHYDRAMNIOS: ICD-10-CM

## 2024-04-15 PROCEDURE — 99214 OFFICE O/P EST MOD 30 MIN: CPT | Performed by: STUDENT IN AN ORGANIZED HEALTH CARE EDUCATION/TRAINING PROGRAM

## 2024-04-15 RX ORDER — ONDANSETRON 4 MG/1
4 TABLET, ORALLY DISINTEGRATING ORAL EVERY 8 HOURS PRN
Qty: 30 TABLET | Refills: 2 | Status: SHIPPED | OUTPATIENT
Start: 2024-04-15

## 2024-04-15 NOTE — PROGRESS NOTES
OB follow up < 20 weeks    CC:  Here for prenatal follow up    Raina Grigsby is a 33 y.o.  25+ being seen today for her obstetrical visit.  Patient reports Feeling okay . Taking +PNV.  Needs RF of Zofran.  FM+    Review of Systems  Genitourinary: Neg for cramping, vaginal bleeding, SROM, or dysuria.     /62   Wt 75.8 kg (167 lb 3.2 oz)   LMP  (LMP Unknown)   BMI 29.63 kg/m²     Vitals: VSS; AF    General Appearance:  Awake. Alert. Well developed. Well nourished. In no acute distress.    Visual Inspection: ° Abdomen was normal on visual inspection.  Palpation: ° Abdomen was soft. ° Abdominal non-tender.    Uterus: ° Fundal height was normal for gestational age. ° Not tender.  Uterine Adnexae: ° Normal without masses or tenderness.  Neurological:  ° Oriented to time, place, and person.  Skin:  ° General appearance was normal. No bruising or ecchymosis.  Obstetrical: +FM and FCA     Assessment    1) Pregnancy at 25+    2) T21 neg. CF/SMA/FX neg.  AFP neg    3) Smoker- Vapes.     4) UDS + for THC    5) UTI x 1- unable to finish all abx; Check Urine cx today.     6) Grand multipara- The patient understands that she is a grand multipara because she has given birth to 5 or more infants which increases her risk during pregnancy for factors including but not limited to abnormal fetal presentation, precipitate delivery, uterine atony, placenta previa, uterine rupture, postpartum hemorrhage, etc.     7)  Hx of pre-E and GHTN- Is not taking baby ASA, erx sent.  CMP WNL;   Pr:Cr pending   Refuses ASA      8)  Hx of C/S- emergent due to placental abruption; desires repeat and wants tubal    RLTCS and Tubal 28wga   Sterilization Consult  We discussed all other forms of contraception including pills, patch, vaginal ring, injection, implant, IUD, and vasectomy. We discussed the forms of permanent sterilization including  laparoscopic tubal occlusion/partial salpingectomy, and laparoscopic salpingectomy.  We  discussed a minimal increased risk of bleeding with the salpingectomy as well as the benefits including ovarian cancer risk reduction and reduced risk of ectopic pregnancy.   We discussed a risk of regret of up to 40% in women <30 years of age.  I asked her to consider if her desires would change if something happened to her current children, if she were to divorce, or if her spouse were to die unexpectedly.  We discussed that this is a permanent procedure and that she would need in vitro fertilization at the cost of up to $20,000 per cycle if she desired a pregnancy after this procedure.    We discussed that risks of surgery also include bleeding, possible need for a transfusion (with associated risks of HIV, hepatitis, and other infectious diseases), infection requiring prolonged hospitalization and treatment with antibiotics, damage to other structures (bowel, bladder, ureters, blood vessels) requiring further surgery necessitating a larger incision to remove or repair affected organs with subsequent poor wound healing, and persistent pain.    She was also counseled that anesthesia carries its own risks and that any major surgery carries the rare risk of blood clots, pulmonary embolism, stroke, heart attack, or death. All of the patient's questions were answered to her satisfaction and she desires to proceed with surgery.      9)  Desires sterilization- signs paper at 28 wga     10) H/O PPROM @ 36 weeks with 5th child- Did not do [place] or vaginal progesterone with consecutive pregnancies      11) Short interval between pregnancy- C/S 3/2023; CL 4.0cm @ 16wga      Plan    Continue prenatal vitamins   Reviewed this stage of pregnancy  Problem list updated   Follow up in 3 weeks for OB , 2hr gtt, H/H, Tdap     Parts of this document have been copied or forwarded from her previous visits and have been reviewed, updated and edited as indicated.      Mason Aquino DO  4/15/2024  16:08 EDT

## 2024-05-06 ENCOUNTER — ROUTINE PRENATAL (OUTPATIENT)
Dept: OBSTETRICS AND GYNECOLOGY | Facility: CLINIC | Age: 34
End: 2024-05-06
Payer: COMMERCIAL

## 2024-05-06 VITALS — SYSTOLIC BLOOD PRESSURE: 110 MMHG | WEIGHT: 169 LBS | BODY MASS INDEX: 29.95 KG/M2 | DIASTOLIC BLOOD PRESSURE: 60 MMHG

## 2024-05-06 DIAGNOSIS — Z30.2 REQUEST FOR STERILIZATION: ICD-10-CM

## 2024-05-06 DIAGNOSIS — O23.40 URINARY TRACT INFECTION IN MOTHER DURING PREGNANCY, ANTEPARTUM: ICD-10-CM

## 2024-05-06 DIAGNOSIS — O09.899 SHORT INTERVAL BETWEEN PREGNANCIES AFFECTING PREGNANCY, ANTEPARTUM: ICD-10-CM

## 2024-05-06 DIAGNOSIS — Z36.9 ENCOUNTER FOR ANTENATAL SCREENING, UNSPECIFIED: ICD-10-CM

## 2024-05-06 DIAGNOSIS — Z98.891 S/P EMERGENCY CESAREAN SECTION: ICD-10-CM

## 2024-05-06 DIAGNOSIS — F17.210 CIGARETTE SMOKER: ICD-10-CM

## 2024-05-06 DIAGNOSIS — Z34.93 PRENATAL CARE IN THIRD TRIMESTER: Primary | ICD-10-CM

## 2024-05-06 DIAGNOSIS — R00.0 TACHYCARDIA, UNSPECIFIED: ICD-10-CM

## 2024-05-06 DIAGNOSIS — R82.5 POSITIVE URINE DRUG SCREEN: ICD-10-CM

## 2024-05-06 DIAGNOSIS — Z87.59 H/O RAPID LABOR: ICD-10-CM

## 2024-05-06 DIAGNOSIS — Z87.59 HISTORY OF PRETERM PREMATURE RUPTURE OF MEMBRANES (PPROM): ICD-10-CM

## 2024-05-06 DIAGNOSIS — O09.299 HX OF PREECLAMPSIA, PRIOR PREGNANCY, CURRENTLY PREGNANT: ICD-10-CM

## 2024-05-06 DIAGNOSIS — Z87.59 HISTORY OF POLYHYDRAMNIOS: ICD-10-CM

## 2024-05-06 DIAGNOSIS — Z64.1 GRAND MULTIPARA: ICD-10-CM

## 2024-05-06 PROCEDURE — 99214 OFFICE O/P EST MOD 30 MIN: CPT | Performed by: NURSE PRACTITIONER

## 2024-05-06 PROCEDURE — 90471 IMMUNIZATION ADMIN: CPT | Performed by: NURSE PRACTITIONER

## 2024-05-06 PROCEDURE — 90715 TDAP VACCINE 7 YRS/> IM: CPT | Performed by: NURSE PRACTITIONER

## 2024-05-07 LAB
GLUCOSE 1H P 75 G GLC PO SERPL-MCNC: 141 MG/DL (ref 70–179)
GLUCOSE 2H P 75 G GLC PO SERPL-MCNC: 108 MG/DL (ref 70–152)
GLUCOSE P FAST SERPL-MCNC: 71 MG/DL (ref 70–91)
HCT VFR BLD AUTO: 35.3 % (ref 34–46.6)
HGB BLD-MCNC: 11.7 G/DL (ref 11.1–15.9)
RPR SER QL: NON REACTIVE

## 2024-05-20 ENCOUNTER — ROUTINE PRENATAL (OUTPATIENT)
Dept: OBSTETRICS AND GYNECOLOGY | Facility: CLINIC | Age: 34
End: 2024-05-20
Payer: COMMERCIAL

## 2024-05-20 VITALS — WEIGHT: 170 LBS | SYSTOLIC BLOOD PRESSURE: 120 MMHG | BODY MASS INDEX: 30.12 KG/M2 | DIASTOLIC BLOOD PRESSURE: 72 MMHG

## 2024-05-20 DIAGNOSIS — O36.5930 MATERNAL CARE FOR POOR FETAL GROWTH IN THIRD TRIMESTER, SINGLE OR UNSPECIFIED FETUS: ICD-10-CM

## 2024-05-20 DIAGNOSIS — Z36.9 ENCOUNTER FOR ANTENATAL SCREENING, UNSPECIFIED: ICD-10-CM

## 2024-05-20 DIAGNOSIS — R82.5 POSITIVE URINE DRUG SCREEN: ICD-10-CM

## 2024-05-20 DIAGNOSIS — Z34.93 PRENATAL CARE IN THIRD TRIMESTER: Primary | ICD-10-CM

## 2024-05-20 DIAGNOSIS — O09.299 HX OF PREECLAMPSIA, PRIOR PREGNANCY, CURRENTLY PREGNANT: ICD-10-CM

## 2024-05-20 NOTE — PROGRESS NOTES
OB follow up > 20 weeks    CC:  Here for prenatal follow up    Raina Grigsby is a 33 y.o.  30w1d being seen today for her obstetrical visit.  She c/o pelvic pressure today. Has occas Willacy Lyn. She does not have a pregnancy support belt. Reports good fetal movement. Denies VB or LOF.     Review of Systems  Genitourinary: Neg for cramping, vaginal bleeding, SROM, or dysuria.     /72   Wt 77.1 kg (170 lb)   LMP  (LMP Unknown)   BMI 30.12 kg/m²     FHT: 140s BPM   Uterine Size: 30cm          Assessment    1) Pregnancy at 30w1d- Passed 2hr GTT. Rh +. Hgb 11.7g/dL. US IMP: Growth 33%. AC 3.1%. VINCENT 16.01cm. Post/fundal placenta- grade 2. CL 3.37cm.     2) T21 neg. CF/SMA/FX neg.  AFP neg    3) Smoker- Quit! Approx 1 month ago.     4) UDS + for THC. Check repeat UDS.     5) UTI x 1- unable to finish all abx; repeat Urine cx neg. Check urine cx today.     6) Grand multipara- The patient understands that she is a grand multipara because she has given birth to 5 or more infants which increases her risk during pregnancy for factors including but not limited to abnormal fetal presentation, precipitate delivery, uterine atony, placenta previa, uterine rupture, postpartum hemorrhage, etc.     7)  Hx of pre-E and GHTN- Is not taking baby ASA- refuses.  CMP WNL; Pr:Cr=checking today      8)  Hx of C/S- emergent due to placental abruption; desires repeat       9)  Desires sterilization- Tubal consent signed and in the chart. Desires bilateral salpingectomy.      10) N/V- improving; has Zofran if needed     11) H/O PPROM @ 36 weeks with 5th child- Did not do [place] or vaginal progesterone with consecutive pregnancies      12) Short interval between pregnancy- C/S 3/2023; CL 4.0cm @ 16wga    13) tachycardia- Continues to occur occasion. May be having palpitations as well. Mainly occurs with activity. Her apple watch indicates a normal heart rate of 70s with increase to 120s.  Thyroid panel WNL; EKG re-ordered  but not completed. Hgb 11.7g/dL.     14) S/P tDap today      15) Anemia- Taking iron. Hgb 11.7g/dL.     16) Pelvic pressure- Enc pregnancy support belt. CL 3.37cm.     17) AC 3%- Overall growth 33%. Ref to [facility] for opinion.       Plan    Continue prenatal vitamins   Reviewed this stage of pregnancy  Problem list updated   Follow up in 2 weeks for OB tummy    I spent 30 minutes caring for Raina on this date of service. This time includes time spent by me in the following activities: preparing for the visit, reviewing tests, obtaining and/or reviewing a separately obtained history, performing a medically appropriate examination and/or evaluation, counseling and educating the patient/family/caregiver, ordering medications, tests, or procedures, documenting information in the medical record, and care coordination      Parts of this document have been copied or forwarded from her previous visits and have been reviewed, updated and edited as indicated.      Kylah Smith, APRN  5/20/2024  10:52 EDT

## 2024-05-21 LAB
AMPHETAMINES UR QL SCN: NEGATIVE NG/ML
BARBITURATES UR QL SCN: NEGATIVE NG/ML
BENZODIAZ UR QL SCN: NEGATIVE NG/ML
BUPRENORPHINE UR QL: NEGATIVE NG/ML
BZE UR QL SCN: NEGATIVE NG/ML
CANNABINOIDS UR QL SCN: POSITIVE NG/ML
CREAT UR-MCNC: 40.2 MG/DL
CREAT UR-MCNC: 42.2 MG/DL (ref 20–300)
FENTANYL UR-MCNC: NEGATIVE PG/ML
LABORATORY COMMENT REPORT: ABNORMAL
MEPERIDINE UR QL: NEGATIVE NG/ML
METHADONE UR QL SCN: NEGATIVE NG/ML
OPIATES UR QL SCN: NEGATIVE NG/ML
OXYCODONE+OXYMORPHONE UR QL SCN: NEGATIVE NG/ML
PCP UR QL: NEGATIVE NG/ML
PH UR: 7.2 [PH] (ref 4.5–8.9)
PROPOXYPH UR QL SCN: NEGATIVE NG/ML
PROT UR-MCNC: 8.2 MG/DL
PROT/CREAT UR: 204 MG/G CREAT (ref 0–200)
TRAMADOL UR QL SCN: NEGATIVE NG/ML

## 2024-05-23 ENCOUNTER — TRANSCRIBE ORDERS (OUTPATIENT)
Dept: ULTRASOUND IMAGING | Facility: HOSPITAL | Age: 34
End: 2024-05-23
Payer: COMMERCIAL

## 2024-05-23 DIAGNOSIS — O36.5991 POOR FETAL GROWTH AFFECTING MANAGEMENT OF MOTHER, ANTEPARTUM, FETUS 1 OF MULTIPLE GESTATION: Primary | ICD-10-CM

## 2024-05-23 LAB
BACTERIA UR CULT: NORMAL
BACTERIA UR CULT: NORMAL

## 2024-05-30 ENCOUNTER — HOSPITAL ENCOUNTER (OUTPATIENT)
Dept: ULTRASOUND IMAGING | Facility: HOSPITAL | Age: 34
Discharge: HOME | End: 2024-05-30
Admitting: OBSTETRICS & GYNECOLOGY
Payer: COMMERCIAL

## 2024-05-30 ENCOUNTER — OFFICE VISIT (OUTPATIENT)
Dept: OBSTETRICS AND GYNECOLOGY | Facility: CLINIC | Age: 34
End: 2024-05-30
Payer: COMMERCIAL

## 2024-05-30 VITALS
BODY MASS INDEX: 30.12 KG/M2 | SYSTOLIC BLOOD PRESSURE: 131 MMHG | HEART RATE: 80 BPM | WEIGHT: 170 LBS | DIASTOLIC BLOOD PRESSURE: 76 MMHG | TEMPERATURE: 99.1 F | HEIGHT: 63 IN

## 2024-05-30 DIAGNOSIS — O09.299 HX OF PREECLAMPSIA, PRIOR PREGNANCY, CURRENTLY PREGNANT: ICD-10-CM

## 2024-05-30 DIAGNOSIS — Z64.1 GRAND MULTIPARA: ICD-10-CM

## 2024-05-30 DIAGNOSIS — O36.5930 MATERNAL CARE FOR POOR FETAL GROWTH IN THIRD TRIMESTER, SINGLE OR UNSPECIFIED FETUS: Primary | ICD-10-CM

## 2024-05-30 DIAGNOSIS — O36.5991 POOR FETAL GROWTH AFFECTING MANAGEMENT OF MOTHER, ANTEPARTUM, FETUS 1 OF MULTIPLE GESTATION: ICD-10-CM

## 2024-05-30 PROCEDURE — 76820 UMBILICAL ARTERY ECHO: CPT

## 2024-05-30 PROCEDURE — 76811 OB US DETAILED SNGL FETUS: CPT

## 2024-05-30 PROCEDURE — 76819 FETAL BIOPHYS PROFIL W/O NST: CPT

## 2024-05-30 NOTE — PROGRESS NOTES
Pt reports that she is doing well and denies vaginal bleeding, cramping or LOF at this time. Patient reports occasional BH. Reports active fetal movement. Reviewed when to call OB office or present to L&D for evaluation with symptoms such as decreased fetal movement, vaginal bleeding, LOF or ctxs. Pt verbalized understanding. Denies HA, visual changes or epigastric pain. Denies any additional complaints at time of appointment. Next OB appointment scheduled for 06/03/2024.    Vitals:    05/30/24 1046   BP: 131/76   Pulse: 80   Temp: 99.1 °F (37.3 °C)

## 2024-05-30 NOTE — LETTER
May 30, 2024       No Recipients    Patient: Raina Grigsby   YOB: 1990   Date of Visit: 2024       Dear TAIWO Tong    Raina Grigsby was in my office today. Below is a copy of my note.    If you have questions, please do not hesitate to call me. I look forward to following Raina along with you.         Sincerely,        Radha Morse MD    MATERNAL FETAL MEDICINE Consult Note    Dear TAIWO Barkley:    Thank you for your kind referral of Raina Grigsby.  As you know, she is a 33 y.o.   at  31 4/7 weeks gestation (Estimated Date of Delivery: 24). This is a consult.      Her antepartum course is complicated by:  FGR, normal dopplers  Grand multip  Hx of preE and abruption with previous pregnancy    Aneuploidy Screening: low risk    HPI: Today, she denies headache, blurry vision, RUQ pain. No vaginal bleeding, no contractions.     Review of History:  Past Medical History:   Diagnosis Date   • Gestational hypertension 3/30/2023   • Hidradenitis      Past Surgical History:   Procedure Laterality Date   •  SECTION N/A 3/30/2023    Procedure:  SECTION PRIMARY;  Surgeon: Ester Hudson MD;  Location: [facility] LABOR DELIVERY;  Service: Obstetrics/Gynecology;  Laterality: N/A;   • GALLBLADDER SURGERY     • TONSILLECTOMY     • WISDOM TOOTH EXTRACTION         Social History     Socioeconomic History   • Marital status: Single   Tobacco Use   • Smoking status: Former     Current packs/day: 0.50     Types: Cigarettes   • Smokeless tobacco: Never   Vaping Use   • Vaping status: Never Used   Substance and Sexual Activity   • Alcohol use: Never   • Drug use: Not Currently     Types: Marijuana   • Sexual activity: Yes     Partners: Male     Family History   Problem Relation Age of Onset   • Alcohol abuse Mother    • Hypertension Father    • COPD Father    • Diabetes Father         Recently diagnosed   • Breast cancer Maternal  "Grandmother    • Diabetes Paternal Grandmother    • Prostate cancer Paternal Grandfather       Allergies   Allergen Reactions   • Egg White (Egg Protein) Anaphylaxis   • Ibuprofen Hives   • Peanut (Diagnostic) Anaphylaxis   • Lactose Intolerance [Tilactase] Diarrhea     Pt says she can eat cheese      Current Outpatient Medications on File Prior to Visit   Medication Sig Dispense Refill   • aspirin 81 MG chewable tablet Chew 1 tablet Daily. 30 tablet 11   • ferrous gluconate (FERGON) 324 MG tablet Take 1 tablet by mouth Daily With Breakfast. 100 tablet 2   • ondansetron ODT (ZOFRAN-ODT) 4 MG disintegrating tablet Place 1 tablet on the tongue Every 8 (Eight) Hours As Needed for Nausea or Vomiting. 30 tablet 2   • Prenatal Vit-Fe Fumarate-FA (prenatal vitamin 27-0.8) 27-0.8 MG tablet tablet Take  by mouth Daily.       No current facility-administered medications on file prior to visit.        Past obstetric, gynecological, medical, surgical, family and social history reviewed.  Relevant lab work and imaging reviewed.    Review of systems  Constitutional:  denies fever, chills, malaise.   ENT/Mouth:  denies sore throat, tinnitus  Eyes: denies vision changes/pain  CV:  denies chest pain  Respiratory:  denies cough/SOB  GI:  denies N/V, diarrhea, abdominal pain.    :   denies dysuria  Skin:  denies lesions or pruritus   Neuro:  denies weakness, focal neurologic symptoms    Vitals:    05/30/24 1046   BP: 131/76   BP Location: Right arm   Patient Position: Sitting   Pulse: 80   Temp: 99.1 °F (37.3 °C)   TempSrc: Temporal   Weight: 77.1 kg (170 lb)   Height: 160 cm (63\")       PHYSICAL EXAM   GENERAL: Not in acute distress, AAOx3, pleasant  CARDIO: regular rate and rhythm  PULM: symmetric chest rise, speaking in complete sentences without difficulty  NEURO: awake, alert and oriented to person, place, and time  ABDOMINAL: No fundal tenderness, no rebound or guarding, gravid  EXTREMITIES: no bilateral lower extremity " edema/tenderness  SKIN: Warm, well-perfused      ULTRASOUND   Please view full ultrasound note on Imaging tab in ViewPoint.  Cephalic presentation.  Posterior circumvallate placenta.  VINCENT 19 cm, which is normal.   EFW 1441 g (21%, AC 5%)  BPP 8/8  UA dopplers normal forward flow with out absent or reversed end diastolic flow.    Anatomy appears normal.      ASSESSMENT/COUNSELIN y.o.   at  31 4/7 weeks gestation (Estimated Date of Delivery: 24).    -Pregnancy  [ X ] stable  [   ] improving [  ] worsening    Diagnoses and all orders for this visit:    1. Maternal care for poor fetal growth in third trimester, single or unspecified fetus (Primary)    2. Hx of preeclampsia, prior pregnancy, currently pregnant    3. Grand multipara         FGR normal dopplers  Hx of FGR in all prior pregnancies  Stopped smoking 1 month ago--congratulated.  We discussed the potential underlying etiologies of severe fetal growth restriction including but not limited to fetal chromosome disorder, syndromes, congenital infection, constitutional, and placental insufficiency.  FGR is assoicated with increased risk of stillbirth, abnormal heart rates patterns.  She has had genetic screening this pregnancy which was low risk and no structural abnormalities seen on ultrasound, making aneuploidy/genetic causes less likely. With congenital infections, we often see profound CNS findings such as ventriculomegaly, echogenic bowel, and even hydrops.  We do not see any of this today.       I think the most likely cause of this is placental insufficiency vs constitutional.  Timing of delivery is guided by the results  testing of fetal well being (ultrasound BPP, FHR tracing findings) in addition to Doppler findings (umbilical artery, MCA, and ductus venosus).  Today we have normal umbilical artery dopplers with an 8/8 BPP which is reassuring.  We discussed umbilical artery doppler studies as 4 possible levels: normal, elevated,  absent, and reversed.  We discussed that absent and reversed would require admission,  corticosteroids, and possibly delivery depending on gestational age and other testing.         I would recommend limitation of activities, daily fetal movement assessment, and twice weekly ANFS with BPP and Doppler studies weekly.  Placental insufficiency causing fetal growth restriction can be a heralding sign of pre-eclampsia.  She has no evidence of this today, but should be observed for possible development as pregnancy progresses.  We discussed in detail symptoms of pre-eclampsia. She has had this before with some of her prior pregnancies.       Summary of Plan  -Repeat Growth in 4 weeks  -Thursday BPP/dopplers MFM, Monday BPP or NST at primary OB recommended.  -Careful attention to fetal movement and symptoms of pre-eclampsia  -Continue to follow with OB for prenatal care  -Delivery 38 weeks unless indicated sooner.     Follow-up: Follow up weekly    Thank you for the consult and opportunity to care for this patient.  Please feel free to reach out with any questions or concerns.      I spent 24 minutes caring for this patient on this date of service. This time includes time spent by me in the following activities: preparing for the visit, reviewing tests, obtaining and/or reviewing a separately obtained history, performing a medically appropriate examination and/or evaluation, counseling and educating the patient/family/caregiver and independently interpreting results and communicating that information with the patient/family/caregiver with greater than 50% spent in counseling and coordination of care.       I spent 5 minutes on the separately reported service of US imaging not included in the time used to support the E/M service also reported today.      Radha Morse MD [facility] [place]  Maternal Fetal Medicine-[facility]  Office: [phone]  [email or web address]

## 2024-05-30 NOTE — PROGRESS NOTES
MATERNAL FETAL MEDICINE Consult Note    Dear Dr Kylah Smith, APRN:    Thank you for your kind referral of Raina Grigsby.  As you know, she is a 33 y.o.   at  31 4/7 weeks gestation (Estimated Date of Delivery: 24). This is a consult.      Her antepartum course is complicated by:  FGR, normal dopplers  Grand multip  Hx of preE and abruption with previous pregnancy    Aneuploidy Screening: low risk    HPI: Today, she denies headache, blurry vision, RUQ pain. No vaginal bleeding, no contractions.     Review of History:  Past Medical History:   Diagnosis Date    Gestational hypertension 3/30/2023    Hidradenitis      Past Surgical History:   Procedure Laterality Date     SECTION N/A 3/30/2023    Procedure:  SECTION PRIMARY;  Surgeon: Esetr Hudson MD;  Location: [facility] LABOR DELIVERY;  Service: Obstetrics/Gynecology;  Laterality: N/A;    GALLBLADDER SURGERY      TONSILLECTOMY      WISDOM TOOTH EXTRACTION         Social History     Socioeconomic History    Marital status: Single   Tobacco Use    Smoking status: Former     Current packs/day: 0.50     Types: Cigarettes    Smokeless tobacco: Never   Vaping Use    Vaping status: Never Used   Substance and Sexual Activity    Alcohol use: Never    Drug use: Not Currently     Types: Marijuana    Sexual activity: Yes     Partners: Male     Family History   Problem Relation Age of Onset    Alcohol abuse Mother     Hypertension Father     COPD Father     Diabetes Father         Recently diagnosed    Breast cancer Maternal Grandmother     Diabetes Paternal Grandmother     Prostate cancer Paternal Grandfather       Allergies   Allergen Reactions    Egg White (Egg Protein) Anaphylaxis    Ibuprofen Hives    Peanut (Diagnostic) Anaphylaxis    Lactose Intolerance [Tilactase] Diarrhea     Pt says she can eat cheese      Current Outpatient Medications on File Prior to Visit   Medication Sig Dispense Refill    aspirin 81 MG chewable  "tablet Chew 1 tablet Daily. 30 tablet 11    ferrous gluconate (FERGON) 324 MG tablet Take 1 tablet by mouth Daily With Breakfast. 100 tablet 2    ondansetron ODT (ZOFRAN-ODT) 4 MG disintegrating tablet Place 1 tablet on the tongue Every 8 (Eight) Hours As Needed for Nausea or Vomiting. 30 tablet 2    Prenatal Vit-Fe Fumarate-FA (prenatal vitamin 27-0.8) 27-0.8 MG tablet tablet Take  by mouth Daily.       No current facility-administered medications on file prior to visit.        Past obstetric, gynecological, medical, surgical, family and social history reviewed.  Relevant lab work and imaging reviewed.    Review of systems  Constitutional:  denies fever, chills, malaise.   ENT/Mouth:  denies sore throat, tinnitus  Eyes: denies vision changes/pain  CV:  denies chest pain  Respiratory:  denies cough/SOB  GI:  denies N/V, diarrhea, abdominal pain.    :   denies dysuria  Skin:  denies lesions or pruritus   Neuro:  denies weakness, focal neurologic symptoms    Vitals:    24 1046   BP: 131/76   BP Location: Right arm   Patient Position: Sitting   Pulse: 80   Temp: 99.1 °F (37.3 °C)   TempSrc: Temporal   Weight: 77.1 kg (170 lb)   Height: 160 cm (63\")       PHYSICAL EXAM   GENERAL: Not in acute distress, AAOx3, pleasant  CARDIO: regular rate and rhythm  PULM: symmetric chest rise, speaking in complete sentences without difficulty  NEURO: awake, alert and oriented to person, place, and time  ABDOMINAL: No fundal tenderness, no rebound or guarding, gravid  EXTREMITIES: no bilateral lower extremity edema/tenderness  SKIN: Warm, well-perfused      ULTRASOUND   Please view full ultrasound note on Imaging tab in ViewPoint.  Cephalic presentation.  Posterior circumvallate placenta.  VINCENT 19 cm, which is normal.   EFW 1441 g (21%, AC 5%)  BPP 8/8  UA dopplers normal forward flow with out absent or reversed end diastolic flow.    Anatomy appears normal.      ASSESSMENT/COUNSELIN y.o.   at  31 4/7 weeks " gestation (Estimated Date of Delivery: 24).    -Pregnancy  [ X ] stable  [   ] improving [  ] worsening    Diagnoses and all orders for this visit:    1. Maternal care for poor fetal growth in third trimester, single or unspecified fetus (Primary)    2. Hx of preeclampsia, prior pregnancy, currently pregnant    3. Grand multipara         FGR normal dopplers  Hx of FGR in all prior pregnancies  Stopped smoking 1 month ago--congratulated.  We discussed the potential underlying etiologies of severe fetal growth restriction including but not limited to fetal chromosome disorder, syndromes, congenital infection, constitutional, and placental insufficiency.  FGR is assoicated with increased risk of stillbirth, abnormal heart rates patterns.  She has had genetic screening this pregnancy which was low risk and no structural abnormalities seen on ultrasound, making aneuploidy/genetic causes less likely. With congenital infections, we often see profound CNS findings such as ventriculomegaly, echogenic bowel, and even hydrops.  We do not see any of this today.       I think the most likely cause of this is placental insufficiency vs constitutional.  Timing of delivery is guided by the results  testing of fetal well being (ultrasound BPP, FHR tracing findings) in addition to Doppler findings (umbilical artery, MCA, and ductus venosus).  Today we have normal umbilical artery dopplers with an 8/8 BPP which is reassuring.  We discussed umbilical artery doppler studies as 4 possible levels: normal, elevated, absent, and reversed.  We discussed that absent and reversed would require admission,  corticosteroids, and possibly delivery depending on gestational age and other testing.         I would recommend limitation of activities, daily fetal movement assessment, and twice weekly ANFS with BPP and Doppler studies weekly.  Placental insufficiency causing fetal growth restriction can be a heralding sign of pre-eclampsia.   She has no evidence of this today, but should be observed for possible development as pregnancy progresses.  We discussed in detail symptoms of pre-eclampsia. She has had this before with some of her prior pregnancies.       Summary of Plan  -Repeat Growth in 4 weeks  -Thursday BPP/dopplers MFM, Monday BPP or NST at primary OB recommended.  -Careful attention to fetal movement and symptoms of pre-eclampsia  -Continue to follow with OB for prenatal care  -Delivery 38 weeks unless indicated sooner.     Follow-up: Follow up weekly    Thank you for the consult and opportunity to care for this patient.  Please feel free to reach out with any questions or concerns.      I spent 24 minutes caring for this patient on this date of service. This time includes time spent by me in the following activities: preparing for the visit, reviewing tests, obtaining and/or reviewing a separately obtained history, performing a medically appropriate examination and/or evaluation, counseling and educating the patient/family/caregiver and independently interpreting results and communicating that information with the patient/family/caregiver with greater than 50% spent in counseling and coordination of care.       I spent 5 minutes on the separately reported service of US imaging not included in the time used to support the E/M service also reported today.      Radha Morse MD FACOG  Maternal Fetal Medicine-[facility]  Office: [phone]  [email or web address]

## 2024-05-31 ENCOUNTER — TRANSCRIBE ORDERS (OUTPATIENT)
Dept: ULTRASOUND IMAGING | Facility: HOSPITAL | Age: 34
End: 2024-05-31
Payer: COMMERCIAL

## 2024-05-31 DIAGNOSIS — O36.5991 POOR FETAL GROWTH AFFECTING MANAGEMENT OF MOTHER, ANTEPARTUM, FETUS 1 OF MULTIPLE GESTATION: Primary | ICD-10-CM

## 2024-06-03 ENCOUNTER — ROUTINE PRENATAL (OUTPATIENT)
Dept: OBSTETRICS AND GYNECOLOGY | Facility: CLINIC | Age: 34
End: 2024-06-03
Payer: COMMERCIAL

## 2024-06-03 VITALS — SYSTOLIC BLOOD PRESSURE: 126 MMHG | DIASTOLIC BLOOD PRESSURE: 80 MMHG | WEIGHT: 168 LBS | BODY MASS INDEX: 29.76 KG/M2

## 2024-06-03 DIAGNOSIS — Z36.9 ENCOUNTER FOR ANTENATAL SCREENING, UNSPECIFIED: ICD-10-CM

## 2024-06-03 DIAGNOSIS — Z64.1 GRAND MULTIPARA: ICD-10-CM

## 2024-06-03 DIAGNOSIS — Z34.93 PRENATAL CARE IN THIRD TRIMESTER: Primary | ICD-10-CM

## 2024-06-03 DIAGNOSIS — O21.9 NAUSEA AND VOMITING DURING PREGNANCY PRIOR TO 22 WEEKS GESTATION: ICD-10-CM

## 2024-06-03 LAB
BILIRUB BLD-MCNC: NEGATIVE MG/DL
CLARITY, POC: CLEAR
COLOR UR: YELLOW
GLUCOSE UR STRIP-MCNC: NEGATIVE MG/DL
KETONES UR QL: ABNORMAL
LEUKOCYTE EST, POC: NEGATIVE
NITRITE UR-MCNC: NEGATIVE MG/ML
PH UR: 8 [PH] (ref 5–8)
PROT UR STRIP-MCNC: ABNORMAL MG/DL
RBC # UR STRIP: NEGATIVE /UL
SP GR UR: 1 (ref 1–1.03)
UROBILINOGEN UR QL: NORMAL

## 2024-06-03 RX ORDER — ONDANSETRON 4 MG/1
4 TABLET, ORALLY DISINTEGRATING ORAL EVERY 8 HOURS PRN
Qty: 30 TABLET | Refills: 2 | Status: SHIPPED | OUTPATIENT
Start: 2024-06-03

## 2024-06-03 NOTE — PROGRESS NOTES
OB follow up > 20 weeks    CC:  Here for prenatal follow up    Raina Grigsby is a 33 y.o.  32w1d being seen today for her obstetrical visit.  She is S/P MFM consult. She reports good fetal movement. She is doing kick counts. She has been struggling with nausea and vomiting. She has been out of her Zofran RX- requesting refills.     Review of Systems  Genitourinary: Neg for cramping, vaginal bleeding, SROM, or dysuria.     /80   Wt 76.2 kg (168 lb)   LMP  (LMP Unknown)   BMI 29.76 kg/m²     FHT: present BPM   Uterine Size: present         Assessment    1) Pregnancy at 32w1d- US IMP: Presentation: VTX. Placenta: Posterior/Fundal. VINCENT: 12.7cm  FCA: 120's. BPP 8/8 .    2) T21 neg. CF/SMA/FX neg.  AFP neg    3) Smoker- Quit! Approx 1 month ago.     4) UDS + for THC- Repeat UDS remains + for THC, enc no use in pregnancy.     5) UTI x 1- unable to finish all abx; repeat Urine cx neg 24     6) Grand multipara- The patient understands that she is a grand multipara because she has given birth to 5 or more infants which increases her risk during pregnancy for factors including but not limited to abnormal fetal presentation, precipitate delivery, uterine atony, placenta previa, uterine rupture, postpartum hemorrhage, etc.     7)  Hx of pre-E and GHTN- Is not taking baby ASA- refuses.  CMP WNL; Pr/Cr ratio= 204 on 24      8)  Hx of C/S- emergent due to placental abruption; desires repeat. Plan  @ 38 weeks per M.      9)  Desires sterilization- Tubal consent signed and in the chart. Desires bilateral salpingectomy.      10) N/V- improving; has Zofran if needed- requesting refills, erx sent     11) H/O PPROM @ 36 weeks with 5th child- Did not do [place] or vaginal progesterone with consecutive pregnancies      12) Short interval between pregnancy- C/S 3/2023; CL 4.0cm @ 16wga    13) tachycardia- Continues to occur occasion. May be having palpitations as well. Mainly occurs with activity. Her  apple watch indicates a normal heart rate of 70s with increase to 120s.  Thyroid panel WNL; EKG re-ordered but not completed. Hgb 11.7g/dL.     14) S/P tDap     15) Anemia- Taking iron. Hgb 11.7g/dL.     16) Pelvic pressure- Enc pregnancy support belt. CL 3.37cm @ 30 weeks      17) AC 3%- Overall growth 21%, AC 5% on 5/30/24. S/P MFM consult.   Summary of Plan  -Repeat Growth in 4 weeks  -Thursday BPP/dopplers MFM, Monday BPP or NST at primary OB recommended.  -Careful attention to fetal movement and symptoms of pre-eclampsia  -Continue to follow with OB for prenatal care  -Delivery 38 weeks unless indicated sooner.     18) Proteinuria- Check urine cx     Plan    Continue prenatal vitamins   Reviewed this stage of pregnancy  Problem list updated   Follow up in 1 weeks for BPP and OB tummy     Parts of this document have been copied or forwarded from her previous visits and have been reviewed, updated and edited as indicated.      Kylah Smith, APRN  6/3/2024  11:03 EDT

## 2024-06-05 ENCOUNTER — HOSPITAL ENCOUNTER (OUTPATIENT)
Dept: ULTRASOUND IMAGING | Facility: HOSPITAL | Age: 34
Discharge: HOME | End: 2024-06-05
Payer: COMMERCIAL

## 2024-06-05 ENCOUNTER — OFFICE VISIT (OUTPATIENT)
Dept: OBSTETRICS AND GYNECOLOGY | Facility: CLINIC | Age: 34
End: 2024-06-05
Payer: COMMERCIAL

## 2024-06-05 VITALS
DIASTOLIC BLOOD PRESSURE: 67 MMHG | TEMPERATURE: 98 F | SYSTOLIC BLOOD PRESSURE: 119 MMHG | WEIGHT: 167.6 LBS | BODY MASS INDEX: 29.69 KG/M2 | HEART RATE: 72 BPM

## 2024-06-05 DIAGNOSIS — Z64.1 GRAND MULTIPARA: ICD-10-CM

## 2024-06-05 DIAGNOSIS — O36.5930 MATERNAL CARE FOR POOR FETAL GROWTH IN THIRD TRIMESTER, SINGLE OR UNSPECIFIED FETUS: Primary | ICD-10-CM

## 2024-06-05 DIAGNOSIS — O36.5991 POOR FETAL GROWTH AFFECTING MANAGEMENT OF MOTHER, ANTEPARTUM, FETUS 1 OF MULTIPLE GESTATION: ICD-10-CM

## 2024-06-05 DIAGNOSIS — O09.299 HX OF PREECLAMPSIA, PRIOR PREGNANCY, CURRENTLY PREGNANT: ICD-10-CM

## 2024-06-05 LAB
BACTERIA UR CULT: NORMAL
BACTERIA UR CULT: NORMAL

## 2024-06-05 PROCEDURE — 76819 FETAL BIOPHYS PROFIL W/O NST: CPT

## 2024-06-05 PROCEDURE — 76820 UMBILICAL ARTERY ECHO: CPT

## 2024-06-05 RX ORDER — ACETAMINOPHEN 500 MG
500 TABLET ORAL EVERY 6 HOURS PRN
COMMUNITY

## 2024-06-05 NOTE — PROGRESS NOTES
Pt reports that she is doing well and denies vaginal bleeding, cramping, contractions or LOF at this time. Notes frequent back discomfort with increased activity. Reports active fetal movement. Reviewed when to call OB office or present to L&D for evaluation with symptoms such as decreased fetal movement, vaginal bleeding, LOF or ctxs. Pt verbalized understanding. Reports hx of headaches and floaters, denies any additional visual changes or epigastric pain. Denies any additional complaints at time of appointment. Next OB appointment scheduled for 6/10.    Vitals:    06/05/24 1105   BP: 119/67   Pulse: 72   Temp: 98 °F (36.7 °C)

## 2024-06-05 NOTE — NON STRESS TEST
Reason for test: Other (Comment) (BPP 6/8- movement)  Date of Test: 6/5/2024  Time frame of test: 7676-8238  RN NST Interpretation: Reactive     Accels, no decels    1204 R side  1211 Juice provided  1218 L side    Tracing maternal 1234

## 2024-06-05 NOTE — LETTER
2024     Mason Aquino DO  [address]  [address]  [facility] [place] [facility] [address]    Patient: Raina Grigsby   YOB: 1990   Date of Visit: 2024       Dear Mason Aquino DO    Raina Grigsby was in my office today. Below is a copy of my note.    If you have questions, please do not hesitate to call me. I look forward to following Raina along with you.         Sincerely,        Radha Morse MD    MATERNAL FETAL MEDICINE Consult Note    Dear Dr Mason Aquino DO:    Thank you for your kind referral of Raina Grigsby.  As you know, she is a 33 y.o.   at  32 3/7 weeks gestation (Estimated Date of Delivery: 24). This is a consult.      Her antepartum course is complicated by:  FGR, normal dopplers  Grand multip  Hx of preE and abruption with previous pregnancy    Aneuploidy Screening: low risk    HPI: Today, she denies headache, blurry vision, RUQ pain. No vaginal bleeding, no contractions.     Review of History:  Past Medical History:   Diagnosis Date   • Gestational hypertension 3/30/2023   • Hidradenitis      Past Surgical History:   Procedure Laterality Date   •  SECTION N/A 3/30/2023    Procedure:  SECTION PRIMARY;  Surgeon: Ester Hudson MD;  Location: [facility] LABOR DELIVERY;  Service: Obstetrics/Gynecology;  Laterality: N/A;   • GALLBLADDER SURGERY     • TONSILLECTOMY     • WISDOM TOOTH EXTRACTION         Social History     Socioeconomic History   • Marital status: Single   Tobacco Use   • Smoking status: Former     Current packs/day: 0.50     Types: Cigarettes   • Smokeless tobacco: Never   Vaping Use   • Vaping status: Never Used   Substance and Sexual Activity   • Alcohol use: Never   • Drug use: Not Currently     Types: Marijuana   • Sexual activity: Yes     Partners: Male     Family History   Problem Relation Age of Onset   • Alcohol abuse Mother    • Hypertension Father    • COPD Father    • Diabetes Father          Recently diagnosed   • Breast cancer Maternal Grandmother    • Diabetes Paternal Grandmother    • Prostate cancer Paternal Grandfather       Allergies   Allergen Reactions   • Egg White (Egg Protein) Anaphylaxis   • Ibuprofen Hives   • Peanut (Diagnostic) Anaphylaxis   • Lactose Intolerance [Tilactase] Diarrhea     Pt says she can eat cheese      Current Outpatient Medications on File Prior to Visit   Medication Sig Dispense Refill   • acetaminophen (TYLENOL) 500 MG tablet Take 1 tablet by mouth Every 6 (Six) Hours As Needed for Mild Pain.     • aspirin 81 MG chewable tablet Chew 1 tablet Daily. 30 tablet 11   • ferrous gluconate (FERGON) 324 MG tablet Take 1 tablet by mouth Daily With Breakfast. 100 tablet 2   • ondansetron ODT (ZOFRAN-ODT) 4 MG disintegrating tablet Place 1 tablet on the tongue Every 8 (Eight) Hours As Needed for Nausea or Vomiting. 30 tablet 2   • Prenatal Vit-Fe Fumarate-FA (prenatal vitamin 27-0.8) 27-0.8 MG tablet tablet Take  by mouth Daily.       No current facility-administered medications on file prior to visit.        Past obstetric, gynecological, medical, surgical, family and social history reviewed.  Relevant lab work and imaging reviewed.    Review of systems  Constitutional:  denies fever, chills, malaise.   ENT/Mouth:  denies sore throat, tinnitus  Eyes: denies vision changes/pain  CV:  denies chest pain  Respiratory:  denies cough/SOB  GI:  denies N/V, diarrhea, abdominal pain.    :   denies dysuria  Skin:  denies lesions or pruritus   Neuro:  denies weakness, focal neurologic symptoms    Vitals:    06/05/24 1105   BP: 119/67   BP Location: Right arm   Patient Position: Sitting   Pulse: 72   Temp: 98 °F (36.7 °C)   TempSrc: Temporal   Weight: 76 kg (167 lb 9.6 oz)       PHYSICAL EXAM   GENERAL: Not in acute distress, AAOx3, pleasant  CARDIO: regular rate and rhythm  PULM: symmetric chest rise, speaking in complete sentences without difficulty  NEURO: awake, alert and oriented to  person, place, and time  ABDOMINAL: No fundal tenderness, no rebound or guarding, gravid  EXTREMITIES: no bilateral lower extremity edema/tenderness  SKIN: Warm, well-perfused      ULTRASOUND   Please view full ultrasound note on Imaging tab in ViewPoint.  Cephalic presentation,.  Posterior placenta.  VINCENT 15 cm, which is normal.   BPP 6/8 (-2 movement) (8/10 with NST, which is reassuring)  Normal UA dopplers without elevated, absent or reversed flow visualized.   Anatomy follow up appears normal.      NST:  Indication: BPP 6/8/decreased FM  Duration: 30 min  Findings: 120/moderate/+acc/no dec  [place]: quiet      ASSESSMENT/COUNSELIN y.o.   at  32 3/7 weeks gestation (Estimated Date of Delivery: 24).    -Pregnancy  [ X ] stable  [   ] improving [  ] worsening    Diagnoses and all orders for this visit:    1. Maternal care for poor fetal growth in third trimester, single or unspecified fetus (Primary)    2. Hx of preeclampsia, prior pregnancy, currently pregnant    3. Grand multipara           FGR normal dopplers  Hx of FGR in all prior pregnancies  Stopped smoking 1 month ago--congratulated.  I think the most likely cause of this is placental insufficiency vs constitutional.  Timing of delivery is guided by the results  testing of fetal well being (ultrasound BPP, FHR tracing findings) in addition to Doppler findings (umbilical artery, MCA, and ductus venosus).  Today we have normal umbilical artery dopplers with an 8/8 BPP which is reassuring.  We discussed umbilical artery doppler studies as 4 possible levels: normal, elevated, absent, and reversed.  We discussed that absent and reversed would require admission,  corticosteroids, and possibly delivery depending on gestational age and other testing.         I would recommend limitation of activities, daily fetal movement assessment, and twice weekly ANFS with BPP and Doppler studies weekly.  Placental insufficiency causing fetal growth  restriction can be a heralding sign of pre-eclampsia.  She has no evidence of this today, but should be observed for possible development as pregnancy progresses.  We discussed in detail symptoms of pre-eclampsia. She has had this before with some of her prior pregnancies.       Summary of Plan  -Repeat Growth in 4 weeks  -Thursday BPP/dopplers MFM, Monday BPP or NST at primary OB recommended.  -Careful attention to fetal movement and symptoms of pre-eclampsia  -Continue to follow with OB for prenatal care  -Delivery 38 weeks unless indicated sooner.     Follow-up: Follow up weekly    Thank you for the consult and opportunity to care for this patient.  Please feel free to reach out with any questions or concerns.      I spent 14 minutes caring for this patient on this date of service. This time includes time spent by me in the following activities: preparing for the visit, reviewing tests, obtaining and/or reviewing a separately obtained history, performing a medically appropriate examination and/or evaluation, counseling and educating the patient/family/caregiver and independently interpreting results and communicating that information with the patient/family/caregiver with greater than 50% spent in counseling and coordination of care.       I spent 5 minutes on the separately reported service of US imaging not included in the time used to support the E/M service also reported today.      Radha Morse MD FACOG  Maternal Fetal Medicine-[facility]  Office: [phone]  [email or web address]

## 2024-06-05 NOTE — PROGRESS NOTES
MATERNAL FETAL MEDICINE Consult Note    Dear Dr Mason Aquino, DO:    Thank you for your kind referral of Raina Grigsby.  As you know, she is a 33 y.o.   at  32 3/7 weeks gestation (Estimated Date of Delivery: 24). This is a consult.      Her antepartum course is complicated by:  FGR, normal dopplers  Grand multip  Hx of preE and abruption with previous pregnancy    Aneuploidy Screening: low risk    HPI: Today, she denies headache, blurry vision, RUQ pain. No vaginal bleeding, no contractions.     Review of History:  Past Medical History:   Diagnosis Date    Gestational hypertension 3/30/2023    Hidradenitis      Past Surgical History:   Procedure Laterality Date     SECTION N/A 3/30/2023    Procedure:  SECTION PRIMARY;  Surgeon: Ester Hudson MD;  Location: [facility] LABOR DELIVERY;  Service: Obstetrics/Gynecology;  Laterality: N/A;    GALLBLADDER SURGERY      TONSILLECTOMY      WISDOM TOOTH EXTRACTION         Social History     Socioeconomic History    Marital status: Single   Tobacco Use    Smoking status: Former     Current packs/day: 0.50     Types: Cigarettes    Smokeless tobacco: Never   Vaping Use    Vaping status: Never Used   Substance and Sexual Activity    Alcohol use: Never    Drug use: Not Currently     Types: Marijuana    Sexual activity: Yes     Partners: Male     Family History   Problem Relation Age of Onset    Alcohol abuse Mother     Hypertension Father     COPD Father     Diabetes Father         Recently diagnosed    Breast cancer Maternal Grandmother     Diabetes Paternal Grandmother     Prostate cancer Paternal Grandfather       Allergies   Allergen Reactions    Egg White (Egg Protein) Anaphylaxis    Ibuprofen Hives    Peanut (Diagnostic) Anaphylaxis    Lactose Intolerance [Tilactase] Diarrhea     Pt says she can eat cheese      Current Outpatient Medications on File Prior to Visit   Medication Sig Dispense Refill    acetaminophen (TYLENOL)  500 MG tablet Take 1 tablet by mouth Every 6 (Six) Hours As Needed for Mild Pain.      aspirin 81 MG chewable tablet Chew 1 tablet Daily. 30 tablet 11    ferrous gluconate (FERGON) 324 MG tablet Take 1 tablet by mouth Daily With Breakfast. 100 tablet 2    ondansetron ODT (ZOFRAN-ODT) 4 MG disintegrating tablet Place 1 tablet on the tongue Every 8 (Eight) Hours As Needed for Nausea or Vomiting. 30 tablet 2    Prenatal Vit-Fe Fumarate-FA (prenatal vitamin 27-0.8) 27-0.8 MG tablet tablet Take  by mouth Daily.       No current facility-administered medications on file prior to visit.        Past obstetric, gynecological, medical, surgical, family and social history reviewed.  Relevant lab work and imaging reviewed.    Review of systems  Constitutional:  denies fever, chills, malaise.   ENT/Mouth:  denies sore throat, tinnitus  Eyes: denies vision changes/pain  CV:  denies chest pain  Respiratory:  denies cough/SOB  GI:  denies N/V, diarrhea, abdominal pain.    :   denies dysuria  Skin:  denies lesions or pruritus   Neuro:  denies weakness, focal neurologic symptoms    Vitals:    06/05/24 1105   BP: 119/67   BP Location: Right arm   Patient Position: Sitting   Pulse: 72   Temp: 98 °F (36.7 °C)   TempSrc: Temporal   Weight: 76 kg (167 lb 9.6 oz)       PHYSICAL EXAM   GENERAL: Not in acute distress, AAOx3, pleasant  CARDIO: regular rate and rhythm  PULM: symmetric chest rise, speaking in complete sentences without difficulty  NEURO: awake, alert and oriented to person, place, and time  ABDOMINAL: No fundal tenderness, no rebound or guarding, gravid  EXTREMITIES: no bilateral lower extremity edema/tenderness  SKIN: Warm, well-perfused      ULTRASOUND   Please view full ultrasound note on Imaging tab in ViewPoint.  Cephalic presentation,.  Posterior placenta.  VINCENT 15 cm, which is normal.   BPP 6/8 (-2 movement) (8/10 with NST, which is reassuring)  Normal UA dopplers without elevated, absent or reversed flow visualized.    Anatomy follow up appears normal.      NST:  Indication: BPP 6/8/decreased FM  Duration: 30 min  Findings: 120/moderate/+acc/no dec  [place]: quiet      ASSESSMENT/COUNSELIN y.o.   at  32 3/7 weeks gestation (Estimated Date of Delivery: 24).    -Pregnancy  [ X ] stable  [   ] improving [  ] worsening    Diagnoses and all orders for this visit:    1. Maternal care for poor fetal growth in third trimester, single or unspecified fetus (Primary)    2. Hx of preeclampsia, prior pregnancy, currently pregnant    3. Grand multipara           FGR normal dopplers  Hx of FGR in all prior pregnancies  Stopped smoking 1 month ago--congratulated.  I think the most likely cause of this is placental insufficiency vs constitutional.  Timing of delivery is guided by the results  testing of fetal well being (ultrasound BPP, FHR tracing findings) in addition to Doppler findings (umbilical artery, MCA, and ductus venosus).  Today we have normal umbilical artery dopplers with an 8/8 BPP which is reassuring.  We discussed umbilical artery doppler studies as 4 possible levels: normal, elevated, absent, and reversed.  We discussed that absent and reversed would require admission,  corticosteroids, and possibly delivery depending on gestational age and other testing.         I would recommend limitation of activities, daily fetal movement assessment, and twice weekly ANFS with BPP and Doppler studies weekly.  Placental insufficiency causing fetal growth restriction can be a heralding sign of pre-eclampsia.  She has no evidence of this today, but should be observed for possible development as pregnancy progresses.  We discussed in detail symptoms of pre-eclampsia. She has had this before with some of her prior pregnancies.       Summary of Plan  -Repeat Growth in 4 weeks  -Thursday BPP/dopplers MFM, Monday BPP or NST at primary OB recommended.  -Careful attention to fetal movement and symptoms of  pre-eclampsia  -Continue to follow with OB for prenatal care  -Delivery 38 weeks unless indicated sooner.     Follow-up: Follow up weekly    Thank you for the consult and opportunity to care for this patient.  Please feel free to reach out with any questions or concerns.      I spent 14 minutes caring for this patient on this date of service. This time includes time spent by me in the following activities: preparing for the visit, reviewing tests, obtaining and/or reviewing a separately obtained history, performing a medically appropriate examination and/or evaluation, counseling and educating the patient/family/caregiver and independently interpreting results and communicating that information with the patient/family/caregiver with greater than 50% spent in counseling and coordination of care.       I spent 5 minutes on the separately reported service of US imaging not included in the time used to support the E/M service also reported today.      Radha Morse MD FACOG  Maternal Fetal Medicine-[facility]  Office: [phone]  [email or web address]

## 2024-06-10 ENCOUNTER — ROUTINE PRENATAL (OUTPATIENT)
Dept: OBSTETRICS AND GYNECOLOGY | Facility: CLINIC | Age: 34
End: 2024-06-10
Payer: COMMERCIAL

## 2024-06-10 VITALS — WEIGHT: 172.2 LBS | BODY MASS INDEX: 30.5 KG/M2 | DIASTOLIC BLOOD PRESSURE: 60 MMHG | SYSTOLIC BLOOD PRESSURE: 110 MMHG

## 2024-06-10 DIAGNOSIS — O23.40 URINARY TRACT INFECTION IN MOTHER DURING PREGNANCY, ANTEPARTUM: ICD-10-CM

## 2024-06-10 DIAGNOSIS — O09.299 HX OF PREECLAMPSIA, PRIOR PREGNANCY, CURRENTLY PREGNANT: ICD-10-CM

## 2024-06-10 DIAGNOSIS — Z64.1 GRAND MULTIPARA: ICD-10-CM

## 2024-06-10 DIAGNOSIS — R82.5 POSITIVE URINE DRUG SCREEN: ICD-10-CM

## 2024-06-10 DIAGNOSIS — F17.210 CIGARETTE SMOKER: ICD-10-CM

## 2024-06-10 DIAGNOSIS — Z98.891 S/P EMERGENCY CESAREAN SECTION: ICD-10-CM

## 2024-06-10 DIAGNOSIS — Z87.59 H/O RAPID LABOR: ICD-10-CM

## 2024-06-10 DIAGNOSIS — O09.899 SHORT INTERVAL BETWEEN PREGNANCIES AFFECTING PREGNANCY, ANTEPARTUM: ICD-10-CM

## 2024-06-10 DIAGNOSIS — Z87.59 HISTORY OF POLYHYDRAMNIOS: ICD-10-CM

## 2024-06-10 DIAGNOSIS — O36.5930 MATERNAL CARE FOR POOR FETAL GROWTH IN THIRD TRIMESTER, SINGLE OR UNSPECIFIED FETUS: ICD-10-CM

## 2024-06-10 DIAGNOSIS — Z87.59 HISTORY OF PRETERM PREMATURE RUPTURE OF MEMBRANES (PPROM): ICD-10-CM

## 2024-06-10 DIAGNOSIS — Z30.2 REQUEST FOR STERILIZATION: ICD-10-CM

## 2024-06-10 DIAGNOSIS — Z36.9 ENCOUNTER FOR ANTENATAL SCREENING, UNSPECIFIED: ICD-10-CM

## 2024-06-10 DIAGNOSIS — Z34.93 PRENATAL CARE IN THIRD TRIMESTER: Primary | ICD-10-CM

## 2024-06-10 LAB
BILIRUB BLD-MCNC: NEGATIVE MG/DL
CLARITY, POC: CLEAR
COLOR UR: YELLOW
GLUCOSE UR STRIP-MCNC: NEGATIVE MG/DL
KETONES UR QL: ABNORMAL
LEUKOCYTE EST, POC: NEGATIVE
NITRITE UR-MCNC: NEGATIVE MG/ML
PH UR: 5 [PH] (ref 5–8)
PROT UR STRIP-MCNC: ABNORMAL MG/DL
RBC # UR STRIP: NEGATIVE /UL
SP GR UR: 1 (ref 1–1.03)
UROBILINOGEN UR QL: NORMAL

## 2024-06-10 PROCEDURE — 99213 OFFICE O/P EST LOW 20 MIN: CPT | Performed by: NURSE PRACTITIONER

## 2024-06-10 NOTE — PROGRESS NOTES
OB follow up > 20 weeks    CC:  Here for prenatal follow up    Raina Grigsby is a 33 y.o.  33w1d being seen today for her obstetrical visit.  She reports fatigue, pedal swelling and [place].  She is S/P MFM consult. She reports good fetal movement.     Review of Systems  Genitourinary: Neg for cramping, vaginal bleeding, SROM, or dysuria.     /60   Wt 78.1 kg (172 lb 3.2 oz)   LMP  (LMP Unknown)   BMI 30.50 kg/m²     FHT: 137 BPM   Uterine Size:          Assessment    1) Pregnancy at 33w1d- US IMP: BPP 8/8. VTX. Left lateral G2 placenta. VINCENT 11.97cm. FHR 137bpm.      2) T21 neg. CF/SMA/FX neg.  AFP neg    3) Smoker- Quit! Approx 1 month ago.     4) UDS + for THC- Repeat UDS remains + for THC, enc no use in pregnancy.     5) UTI x 1- unable to finish all abx; repeat Urine cx neg 24     6) Grand multipara- The patient understands that she is a grand multipara because she has given birth to 5 or more infants which increases her risk during pregnancy for factors including but not limited to abnormal fetal presentation, precipitate delivery, uterine atony, placenta previa, uterine rupture, postpartum hemorrhage, etc.     7)  Hx of pre-E and GHTN- Is not taking baby ASA- refuses.  CMP WNL; Pr/Cr ratio= 204 on 24   Trace protein and ketones noted on urine dip today; occasional headaches but denies visual changes.  Bilateral pedal swelling- improves with rest     8)  Hx of C/S- emergent due to placental abruption; desires repeat. Plan  @ 38 weeks per [facility].      9)  Desires sterilization- Tubal consent signed and in the chart. Desires bilateral salpingectomy.      10) N/V- improving; has Zofran if needed-     11) H/O PPROM @ 36 weeks with 5th child- Did not do [place] or vaginal progesterone with consecutive pregnancies      12) Short interval between pregnancy- C/S 3/2023; CL 4.0cm @ 16wga    13) tachycardia- Continues to occur occasion. May be having palpitations as well. Mainly  occurs with activity. Her apple watch indicates a normal heart rate of 70s with increase to 120s.  Thyroid panel WNL; EKG re-ordered but not completed. Hgb 11.7g/dL.     14) S/P tDap     15) Anemia- Taking iron. Hgb 11.7g/dL.     16) Pelvic pressure- Enc pregnancy support belt. CL 3.37cm @ 30 weeks      17) poor fetal growth- Overall growth 21%, AC 5% on 5/30/24. S/P MFM consult.     Summary of Plan  -Repeat Growth in 4 weeks  -Thursday BPP/dopplers MFM, Monday BPP or NST at primary OB recommended.  -Careful attention to fetal movement and symptoms of pre-eclampsia  -Continue to follow with OB for prenatal care  -Delivery 38 weeks unless indicated sooner.       Plan    Continue prenatal vitamins   Reviewed this stage of pregnancy  Problem list updated   Follow up in 1 weeks for BPP and OB tummy     Parts of this document have been copied or forwarded from her previous visits and have been reviewed, updated and edited as indicated.      Gauri Han, APRN  6/10/2024  15:14 EDT

## 2024-06-13 ENCOUNTER — HOSPITAL ENCOUNTER (OUTPATIENT)
Dept: ULTRASOUND IMAGING | Facility: HOSPITAL | Age: 34
Discharge: HOME | End: 2024-06-13
Admitting: OBSTETRICS & GYNECOLOGY
Payer: COMMERCIAL

## 2024-06-13 ENCOUNTER — OFFICE VISIT (OUTPATIENT)
Dept: OBSTETRICS AND GYNECOLOGY | Facility: CLINIC | Age: 34
End: 2024-06-13
Payer: COMMERCIAL

## 2024-06-13 VITALS
WEIGHT: 169.4 LBS | BODY MASS INDEX: 30.01 KG/M2 | SYSTOLIC BLOOD PRESSURE: 132 MMHG | DIASTOLIC BLOOD PRESSURE: 73 MMHG | HEART RATE: 76 BPM | TEMPERATURE: 98.7 F

## 2024-06-13 DIAGNOSIS — O36.5990 FETAL GROWTH RESTRICTION ANTEPARTUM: ICD-10-CM

## 2024-06-13 DIAGNOSIS — O36.5991 POOR FETAL GROWTH AFFECTING MANAGEMENT OF MOTHER, ANTEPARTUM, FETUS 1 OF MULTIPLE GESTATION: ICD-10-CM

## 2024-06-13 DIAGNOSIS — Z64.1 GRAND MULTIPARA: Primary | ICD-10-CM

## 2024-06-13 PROCEDURE — 76820 UMBILICAL ARTERY ECHO: CPT

## 2024-06-13 PROCEDURE — 76816 OB US FOLLOW-UP PER FETUS: CPT

## 2024-06-13 PROCEDURE — 76819 FETAL BIOPHYS PROFIL W/O NST: CPT

## 2024-06-13 NOTE — LETTER
2024     Mason Aquino DO  [address]  [address]  [facility] [address]    Patient: Raina Grigsby   YOB: 1990   Date of Visit: 2024       Dear Mason Aquino DO    Raina Grigsby was in my office today. Below is a copy of my note.    If you have questions, please do not hesitate to call me. I look forward to following Raina along with you.         Sincerely,        TAIWO Mantilla        CC: No Recipients                          MATERNAL FETAL MEDICINE Consult Note    Dear Dr Mason Aquino DO:    Thank you for your kind referral of Raina Grigsby.  As you know, she is a 33 y.o.   at  33 4/7 weeks gestation (Estimated Date of Delivery: 24). This is a consult.      Her antepartum course is complicated by:  FGR, normal dopplers  Grand multip  Hx of preE and abruption with previous pregnancy    Aneuploidy Screening: low risk    HPI: Today, she denies headache, blurry vision, RUQ pain. No vaginal bleeding, no contractions.     Review of History:  Past Medical History:   Diagnosis Date   • Gestational hypertension 3/30/2023   • Hidradenitis      Past Surgical History:   Procedure Laterality Date   •  SECTION N/A 3/30/2023    Procedure:  SECTION PRIMARY;  Surgeon: Ester Hudson MD;  Location: [facility] LABOR DELIVERY;  Service: Obstetrics/Gynecology;  Laterality: N/A;   • GALLBLADDER SURGERY     • TONSILLECTOMY     • WISDOM TOOTH EXTRACTION       Social History     Socioeconomic History   • Marital status: Single   Tobacco Use   • Smoking status: Former     Current packs/day: 0.50     Types: Cigarettes   • Smokeless tobacco: Never   Vaping Use   • Vaping status: Never Used   Substance and Sexual Activity   • Alcohol use: Never   • Drug use: Not Currently     Types: Marijuana   • Sexual activity: Yes     Partners: Male     Family History   Problem Relation Age of Onset   • Alcohol abuse Mother    • Hypertension  Father    • COPD Father    • Diabetes Father         Recently diagnosed   • Breast cancer Maternal Grandmother    • Diabetes Paternal Grandmother    • Prostate cancer Paternal Grandfather       Allergies   Allergen Reactions   • Egg White (Egg Protein) Anaphylaxis   • Ibuprofen Hives   • Peanut (Diagnostic) Anaphylaxis   • Lactose Intolerance [Tilactase] Diarrhea     Pt says she can eat cheese      Current Outpatient Medications on File Prior to Visit   Medication Sig Dispense Refill   • acetaminophen (TYLENOL) 500 MG tablet Take 1 tablet by mouth Every 6 (Six) Hours As Needed for Mild Pain.     • aspirin 81 MG chewable tablet Chew 1 tablet Daily. 30 tablet 11   • ferrous gluconate (FERGON) 324 MG tablet Take 1 tablet by mouth Daily With Breakfast. 100 tablet 2   • ondansetron ODT (ZOFRAN-ODT) 4 MG disintegrating tablet Place 1 tablet on the tongue Every 8 (Eight) Hours As Needed for Nausea or Vomiting. 30 tablet 2   • Prenatal Vit-Fe Fumarate-FA (prenatal vitamin 27-0.8) 27-0.8 MG tablet tablet Take  by mouth Daily.       No current facility-administered medications on file prior to visit.      Past obstetric, gynecological, medical, surgical, family and social history reviewed.  Relevant lab work and imaging reviewed.    Review of systems  Constitutional:  denies fever, chills, malaise.   ENT/Mouth:  denies sore throat, tinnitus  Eyes: denies vision changes/pain  CV:  denies chest pain  Respiratory:  denies cough/SOB  GI:  denies N/V, diarrhea, abdominal pain.    :   denies dysuria  Skin:  denies lesions or pruritus   Neuro:  denies weakness, focal neurologic symptoms    Vitals:    06/13/24 0959   BP: 132/73   BP Location: Right arm   Patient Position: Sitting   Pulse: 76   Temp: 98.7 °F (37.1 °C)   TempSrc: Temporal   Weight: 76.8 kg (169 lb 6.4 oz)     PHYSICAL EXAM   GENERAL: Not in acute distress, AAOx3, pleasant  CARDIO: regular rate and rhythm  PULM: symmetric chest rise, speaking in complete sentences  without difficulty  NEURO: awake, alert and oriented to person, place, and time  ABDOMINAL: No fundal tenderness, no rebound or guarding, gravid  EXTREMITIES: no bilateral lower extremity edema/tenderness  SKIN: Warm, well-perfused    ULTRASOUND   Please view full ultrasound note on Imaging tab in ViewPoint.  Cephalic presentation,.  Posterior placenta.  VINCENT 15.8 cm, which is normal.   EFW 1775 g (16%, AC 4%)  BPP 8/8   Normal UA dopplers without elevated, absent or reversed flow visualized.     ASSESSMENT/COUNSELIN y.o.   at  33 4/7 weeks gestation (Estimated Date of Delivery: 24).    -Pregnancy  [ X ] stable  [   ] improving [  ] worsening    Diagnoses and all orders for this visit:    1. Grand multipara (Primary)    2. Fetal growth restriction antepartum      FGR normal dopplers  Hx of FGR in all prior pregnancies  Stopped smoking 1 month ago--congratulated.  I think the most likely cause of this is placental insufficiency vs constitutional.  Timing of delivery is guided by the results  testing of fetal well being (ultrasound BPP, FHR tracing findings) in addition to Doppler findings (umbilical artery, MCA, and ductus venosus).  Today we have normal umbilical artery dopplers with an 8/8 BPP which is reassuring.  We discussed umbilical artery doppler studies as 4 possible levels: normal, elevated, absent, and reversed.  We discussed that absent and reversed would require admission,  corticosteroids, and possibly delivery depending on gestational age and other testing.         I would recommend limitation of activities, daily fetal movement assessment, and twice weekly ANFS with BPP and Doppler studies weekly.  Placental insufficiency causing fetal growth restriction can be a heralding sign of pre-eclampsia.  She has no evidence of this today, but should be observed for possible development as pregnancy progresses.  We discussed in detail symptoms of pre-eclampsia. She has had this  before with some of her prior pregnancies.       Summary of Plan  -Repeat Growth in 4 weeks  -Thursday BPP/dopplers MFM, Monday BPP or NST at primary OB recommended.  -Careful attention to fetal movement and symptoms of pre-eclampsia  -Continue to follow with OB for prenatal care  -Delivery 38 weeks unless indicated sooner.     Follow-up: Follow up weekly    Thank you for the consult and opportunity to care for this patient.  Please feel free to reach out with any questions or concerns.      I spent 20 minutes caring for this patient on this date of service. This time includes time spent by me in the following activities: preparing for the visit, reviewing tests, obtaining and/or reviewing a separately obtained history, performing a medically appropriate examination and/or evaluation, counseling and educating the patient/family/caregiver and independently interpreting results and communicating that information with the patient/family/caregiver with greater than 50% spent in counseling and coordination of care.     TAIWO Olmedo  Maternal Fetal Medicine-[facility]  Office: [phone]  [email or web address]

## 2024-06-13 NOTE — PROGRESS NOTES
MATERNAL FETAL MEDICINE Consult Note    Dear Dr Mason Aquino, DO:    Thank you for your kind referral of Raina Grigsby.  As you know, she is a 33 y.o.   at  33 4/7 weeks gestation (Estimated Date of Delivery: 24). This is a consult.      Her antepartum course is complicated by:  FGR, normal dopplers  Grand multip  Hx of preE and abruption with previous pregnancy    Aneuploidy Screening: low risk    HPI: Today, she denies headache, blurry vision, RUQ pain. No vaginal bleeding, no contractions.     Review of History:  Past Medical History:   Diagnosis Date    Gestational hypertension 3/30/2023    Hidradenitis      Past Surgical History:   Procedure Laterality Date     SECTION N/A 3/30/2023    Procedure:  SECTION PRIMARY;  Surgeon: Ester Hudson MD;  Location: [facility] LABOR DELIVERY;  Service: Obstetrics/Gynecology;  Laterality: N/A;    GALLBLADDER SURGERY      TONSILLECTOMY      WISDOM TOOTH EXTRACTION       Social History     Socioeconomic History    Marital status: Single   Tobacco Use    Smoking status: Former     Current packs/day: 0.50     Types: Cigarettes    Smokeless tobacco: Never   Vaping Use    Vaping status: Never Used   Substance and Sexual Activity    Alcohol use: Never    Drug use: Not Currently     Types: Marijuana    Sexual activity: Yes     Partners: Male     Family History   Problem Relation Age of Onset    Alcohol abuse Mother     Hypertension Father     COPD Father     Diabetes Father         Recently diagnosed    Breast cancer Maternal Grandmother     Diabetes Paternal Grandmother     Prostate cancer Paternal Grandfather       Allergies   Allergen Reactions    Egg White (Egg Protein) Anaphylaxis    Ibuprofen Hives    Peanut (Diagnostic) Anaphylaxis    Lactose Intolerance [Tilactase] Diarrhea     Pt says she can eat cheese      Current Outpatient Medications on File Prior to Visit   Medication Sig Dispense Refill    acetaminophen (TYLENOL) 500  MG tablet Take 1 tablet by mouth Every 6 (Six) Hours As Needed for Mild Pain.      aspirin 81 MG chewable tablet Chew 1 tablet Daily. 30 tablet 11    ferrous gluconate (FERGON) 324 MG tablet Take 1 tablet by mouth Daily With Breakfast. 100 tablet 2    ondansetron ODT (ZOFRAN-ODT) 4 MG disintegrating tablet Place 1 tablet on the tongue Every 8 (Eight) Hours As Needed for Nausea or Vomiting. 30 tablet 2    Prenatal Vit-Fe Fumarate-FA (prenatal vitamin 27-0.8) 27-0.8 MG tablet tablet Take  by mouth Daily.       No current facility-administered medications on file prior to visit.      Past obstetric, gynecological, medical, surgical, family and social history reviewed.  Relevant lab work and imaging reviewed.    Review of systems  Constitutional:  denies fever, chills, malaise.   ENT/Mouth:  denies sore throat, tinnitus  Eyes: denies vision changes/pain  CV:  denies chest pain  Respiratory:  denies cough/SOB  GI:  denies N/V, diarrhea, abdominal pain.    :   denies dysuria  Skin:  denies lesions or pruritus   Neuro:  denies weakness, focal neurologic symptoms    Vitals:    24 0959   BP: 132/73   BP Location: Right arm   Patient Position: Sitting   Pulse: 76   Temp: 98.7 °F (37.1 °C)   TempSrc: Temporal   Weight: 76.8 kg (169 lb 6.4 oz)     PHYSICAL EXAM   GENERAL: Not in acute distress, AAOx3, pleasant  CARDIO: regular rate and rhythm  PULM: symmetric chest rise, speaking in complete sentences without difficulty  NEURO: awake, alert and oriented to person, place, and time  ABDOMINAL: No fundal tenderness, no rebound or guarding, gravid  EXTREMITIES: no bilateral lower extremity edema/tenderness  SKIN: Warm, well-perfused    ULTRASOUND   Please view full ultrasound note on Imaging tab in ViewPoint.  Cephalic presentation,.  Posterior placenta.  VINCENT 15.8 cm, which is normal.   EFW 1775 g (16%, AC 4%)  BPP 8/8   Normal UA dopplers without elevated, absent or reversed flow visualized.     ASSESSMENT/COUNSELIN  y.o.   at  33 4/7 weeks gestation (Estimated Date of Delivery: 24).    -Pregnancy  [ X ] stable  [   ] improving [  ] worsening    Diagnoses and all orders for this visit:    1. Grand multipara (Primary)    2. Fetal growth restriction antepartum      FGR normal dopplers  Hx of FGR in all prior pregnancies  Stopped smoking 1 month ago--congratulated.  I think the most likely cause of this is placental insufficiency vs constitutional.  Timing of delivery is guided by the results  testing of fetal well being (ultrasound BPP, FHR tracing findings) in addition to Doppler findings (umbilical artery, MCA, and ductus venosus).  Today we have normal umbilical artery dopplers with an  BPP which is reassuring.  We discussed umbilical artery doppler studies as 4 possible levels: normal, elevated, absent, and reversed.  We discussed that absent and reversed would require admission,  corticosteroids, and possibly delivery depending on gestational age and other testing.         I would recommend limitation of activities, daily fetal movement assessment, and twice weekly ANFS with BPP and Doppler studies weekly.  Placental insufficiency causing fetal growth restriction can be a heralding sign of pre-eclampsia.  She has no evidence of this today, but should be observed for possible development as pregnancy progresses.  We discussed in detail symptoms of pre-eclampsia. She has had this before with some of her prior pregnancies.       Summary of Plan  -Repeat Growth in 4 weeks  -Thursday BPP/dopplers MFM, Monday BPP or NST at primary OB recommended.  -Careful attention to fetal movement and symptoms of pre-eclampsia  -Continue to follow with OB for prenatal care  -Delivery 38 weeks unless indicated sooner.     Follow-up: Follow up weekly    Thank you for the consult and opportunity to care for this patient.  Please feel free to reach out with any questions or concerns.      I spent 20 minutes caring for this  patient on this date of service. This time includes time spent by me in the following activities: preparing for the visit, reviewing tests, obtaining and/or reviewing a separately obtained history, performing a medically appropriate examination and/or evaluation, counseling and educating the patient/family/caregiver and independently interpreting results and communicating that information with the patient/family/caregiver with greater than 50% spent in counseling and coordination of care.     TAIWO Olmedo  Maternal Fetal Medicine-[facility]  Office: [phone]  [email or web address]

## 2024-06-13 NOTE — PROGRESS NOTES
Pt reports that she is doing well and denies vaginal bleeding or LOF at this time. Continues to report Lander Lyn ctxs. Reports active fetal movement. Reviewed when to call OB office or present to L&D for evaluation with symptoms such as decreased fetal movement, vaginal bleeding, LOF or ctxs. Pt verbalized understanding. Denies HA, visual changes or epigastric pain. Notes occasional swelling in lower extremities. Next OB appointment scheduled for 6/17.    Vitals:    06/13/24 0959   BP: 132/73   Pulse: 76   Temp: 98.7 °F (37.1 °C)

## 2024-06-17 ENCOUNTER — ROUTINE PRENATAL (OUTPATIENT)
Dept: OBSTETRICS AND GYNECOLOGY | Facility: CLINIC | Age: 34
End: 2024-06-17
Payer: COMMERCIAL

## 2024-06-17 VITALS — BODY MASS INDEX: 30.22 KG/M2 | SYSTOLIC BLOOD PRESSURE: 124 MMHG | DIASTOLIC BLOOD PRESSURE: 74 MMHG | WEIGHT: 170.6 LBS

## 2024-06-17 DIAGNOSIS — O09.299 HX OF PREECLAMPSIA, PRIOR PREGNANCY, CURRENTLY PREGNANT: ICD-10-CM

## 2024-06-17 DIAGNOSIS — Z36.9 ENCOUNTER FOR ANTENATAL SCREENING, UNSPECIFIED: Primary | ICD-10-CM

## 2024-06-17 DIAGNOSIS — Z30.2 REQUEST FOR STERILIZATION: ICD-10-CM

## 2024-06-17 DIAGNOSIS — Z64.1 GRAND MULTIPARA: ICD-10-CM

## 2024-06-17 DIAGNOSIS — Z98.891 S/P EMERGENCY CESAREAN SECTION: ICD-10-CM

## 2024-06-17 PROCEDURE — 99213 OFFICE O/P EST LOW 20 MIN: CPT | Performed by: NURSE PRACTITIONER

## 2024-06-17 NOTE — PROGRESS NOTES
OB follow up > 20 weeks    CC:  Here for prenatal follow up    Raina Grigsby is a 33 y.o.  34w1d being seen today for her obstetrical visit. She is accompanied by her son today. She reports good fetal movement. She sees MFM on Thursday and this office on Monday. She c/o swelling.     Review of Systems  Genitourinary: Neg for cramping, vaginal bleeding, SROM, or dysuria.     /74   Wt 77.4 kg (170 lb 9.6 oz)   LMP  (LMP Unknown)   BMI 30.22 kg/m²     FHT: 134  BPM   Uterine Size:          Assessment    1) Pregnancy at 34w1d- US IMP: BPP 8/8. VINCENT 20.28cm.  bpm. Placenta post/fundal- grade 3.     2) T21 neg. CF/SMA/FX neg.  AFP neg    3) Smoker- Quit! Approx 1 month ago.     4) UDS + for THC- Repeat UDS remains + for THC, enc no use in pregnancy.     5) UTI x 1- unable to finish all abx; repeat Urine cx neg 24     6) Grand multipara- The patient understands that she is a grand multipara because she has given birth to 5 or more infants which increases her risk during pregnancy for factors including but not limited to abnormal fetal presentation, precipitate delivery, uterine atony, placenta previa, uterine rupture, postpartum hemorrhage, etc.     7)  Hx of pre-E and GHTN- Is not taking baby ASA- refuses.  CMP WNL; Pr/Cr ratio= 204 on 24   Trace protein and ketones noted on urine dip today; occasional headaches but denies visual changes. Rev warn s/s.      8)  Hx of C/S- emergent due to placental abruption; desires repeat. Plan  @ 38 weeks per [facility].      9)  Desires sterilization- Tubal consent signed and in the chart. Desires bilateral salpingectomy.      10) N/V- improving; has Zofran if needed-     11) H/O PPROM @ 36 weeks with 5th child- Did not do Belén or vaginal progesterone with consecutive pregnancies      12) Short interval between pregnancy- C/S 3/2023; CL 4.0cm @ 16wga    13) tachycardia- Continues to occur occasion. May be having palpitations as well. Mainly occurs  with activity. Her apple watch indicates a normal heart rate of 70s with increase to 120s.  Thyroid panel WNL; EKG re-ordered but not completed. Hgb 11.7g/dL.     14) S/P tDap     15) Anemia- Taking iron. Hgb 11.7g/dL.     16) Pelvic pressure- Enc pregnancy support belt. CL 3.37cm @ 30 weeks      17) Poor fetal growth- Overall growth 16%,AC 4% on 6/13/24 compared to growth 21%, AC 5% on 5/30/24. S/P MFM consult.      Summary of Plan 6/13/24  -Repeat Growth in 4 weeks  -Thursday BPP/dopplers MFM, Monday BPP or NST at primary OB recommended.  -Careful attention to fetal movement and symptoms of pre-eclampsia  -Continue to follow with OB for prenatal care  -Delivery 38 weeks unless indicated sooner.      Plan    Continue prenatal vitamins   Reviewed this stage of pregnancy  Problem list updated   Follow up in 1 weeks for BPP and OB tummy     Parts of this document have been copied or forwarded from her previous visits and have been reviewed, updated and edited as indicated.      Kylah Smith, APRN  6/17/2024  13:19 EDT

## 2024-06-20 ENCOUNTER — HOSPITAL ENCOUNTER (OUTPATIENT)
Dept: ULTRASOUND IMAGING | Facility: HOSPITAL | Age: 34
Discharge: HOME | End: 2024-06-20
Admitting: OBSTETRICS & GYNECOLOGY
Payer: COMMERCIAL

## 2024-06-20 ENCOUNTER — OFFICE VISIT (OUTPATIENT)
Dept: OBSTETRICS AND GYNECOLOGY | Facility: CLINIC | Age: 34
End: 2024-06-20
Payer: COMMERCIAL

## 2024-06-20 VITALS
WEIGHT: 172 LBS | TEMPERATURE: 98.4 F | SYSTOLIC BLOOD PRESSURE: 132 MMHG | DIASTOLIC BLOOD PRESSURE: 67 MMHG | BODY MASS INDEX: 30.48 KG/M2 | HEART RATE: 79 BPM | OXYGEN SATURATION: 97 % | HEIGHT: 63 IN

## 2024-06-20 DIAGNOSIS — O36.5990 FETAL GROWTH RESTRICTION ANTEPARTUM: ICD-10-CM

## 2024-06-20 DIAGNOSIS — O36.5991 POOR FETAL GROWTH AFFECTING MANAGEMENT OF MOTHER, ANTEPARTUM, FETUS 1 OF MULTIPLE GESTATION: ICD-10-CM

## 2024-06-20 DIAGNOSIS — Z64.1 GRAND MULTIPARA: Primary | ICD-10-CM

## 2024-06-20 PROCEDURE — 76820 UMBILICAL ARTERY ECHO: CPT

## 2024-06-20 PROCEDURE — 76819 FETAL BIOPHYS PROFIL W/O NST: CPT

## 2024-06-20 NOTE — PROGRESS NOTES
MATERNAL FETAL MEDICINE Consult Note    Dear Dr Mason Aquino, DO:    Thank you for your kind referral of Raina Grigsby.  As you know, she is a 33 y.o.   at  34 4/7 weeks gestation (Estimated Date of Delivery: 24). This is a consult.      Her antepartum course is complicated by:  FGR, normal dopplers  Grand multip  Hx of preE and abruption with previous pregnancy    Aneuploidy Screening: low risk    HPI: Today, she denies headache, blurry vision, RUQ pain. No vaginal bleeding, no contractions.     Review of History:  Past Medical History:   Diagnosis Date    Gestational hypertension 3/30/2023    Hidradenitis      Past Surgical History:   Procedure Laterality Date     SECTION N/A 3/30/2023    Procedure:  SECTION PRIMARY;  Surgeon: Ester Hudson MD;  Location: [facility] LABOR DELIVERY;  Service: Obstetrics/Gynecology;  Laterality: N/A;    GALLBLADDER SURGERY      TONSILLECTOMY      WISDOM TOOTH EXTRACTION       Social History     Socioeconomic History    Marital status: Single   Tobacco Use    Smoking status: Former     Current packs/day: 0.50     Types: Cigarettes    Smokeless tobacco: Never   Vaping Use    Vaping status: Never Used   Substance and Sexual Activity    Alcohol use: Never    Drug use: Not Currently     Types: Marijuana    Sexual activity: Yes     Partners: Male     Family History   Problem Relation Age of Onset    Alcohol abuse Mother     Hypertension Father     COPD Father     Diabetes Father         Recently diagnosed    Breast cancer Maternal Grandmother     Diabetes Paternal Grandmother     Prostate cancer Paternal Grandfather       Allergies   Allergen Reactions    Egg White (Egg Protein) Anaphylaxis    Ibuprofen Hives    Peanut (Diagnostic) Anaphylaxis    Lactose Intolerance [Tilactase] Diarrhea     Pt says she can eat cheese      Current Outpatient Medications on File Prior to Visit   Medication Sig Dispense Refill    acetaminophen (TYLENOL) 500  "MG tablet Take 1 tablet by mouth Every 6 (Six) Hours As Needed for Mild Pain.      aspirin 81 MG chewable tablet Chew 1 tablet Daily. 30 tablet 11    ferrous gluconate (FERGON) 324 MG tablet Take 1 tablet by mouth Daily With Breakfast. 100 tablet 2    ondansetron ODT (ZOFRAN-ODT) 4 MG disintegrating tablet Place 1 tablet on the tongue Every 8 (Eight) Hours As Needed for Nausea or Vomiting. 30 tablet 2    Prenatal Vit-Fe Fumarate-FA (prenatal vitamin 27-0.8) 27-0.8 MG tablet tablet Take  by mouth Daily.       No current facility-administered medications on file prior to visit.      Past obstetric, gynecological, medical, surgical, family and social history reviewed.  Relevant lab work and imaging reviewed.    Review of systems  Constitutional:  denies fever, chills, malaise.   ENT/Mouth:  denies sore throat, tinnitus  Eyes: denies vision changes/pain  CV:  denies chest pain  Respiratory:  denies cough/SOB  GI:  denies N/V, diarrhea, abdominal pain.    :   denies dysuria  Skin:  denies lesions or pruritus   Neuro:  denies weakness, focal neurologic symptoms    Vitals:    06/20/24 1318   BP: 132/67   BP Location: Right arm   Patient Position: Sitting   Pulse: 79   Temp: 98.4 °F (36.9 °C)   TempSrc: Temporal   SpO2: 97%   Weight: 78 kg (172 lb)   Height: 160 cm (63\")     PHYSICAL EXAM   GENERAL: Not in acute distress, AAOx3, pleasant  CARDIO: regular rate and rhythm  PULM: symmetric chest rise, speaking in complete sentences without difficulty  NEURO: awake, alert and oriented to person, place, and time  ABDOMINAL: No fundal tenderness, no rebound or guarding, gravid  EXTREMITIES: no bilateral lower extremity edema/tenderness  SKIN: Warm, well-perfused    ULTRASOUND   Please view full ultrasound note on Imaging tab in ViewPoint.  Cephalic presentation,.  Posterior placenta.  VINCENT 16. 1  cm, which is normal.   BPP 8/8   Normal UA dopplers without elevated, absent or reversed flow visualized. "     ASSESSMENT/COUNSELIN y.o.   at  34 4/7 weeks gestation (Estimated Date of Delivery: 24).    -Pregnancy  [ X ] stable  [   ] improving [  ] worsening    Diagnoses and all orders for this visit:    1. Grand multipara (Primary)    2. Fetal growth restriction antepartum        FGR, NORMAL DOPPLERS  Hx of FGR in all prior pregnancies  Stopped smoking 1 month ago--congratulated.  Previously counseled.   I think the most likely cause of this is placental insufficiency vs constitutional.  Timing of delivery is guided by the results  testing of fetal well being (ultrasound BPP, FHR tracing findings) in addition to Doppler findings (umbilical artery, MCA, and ductus venosus).  Today we have normal umbilical artery dopplers with an 8/8 BPP which is reassuring.  We discussed umbilical artery doppler studies as 4 possible levels: normal, elevated, absent, and reversed.  We discussed that absent and reversed would require admission,  corticosteroids, and possibly delivery depending on gestational age and other testing.         I would recommend limitation of activities, daily fetal movement assessment, and twice weekly ANFS with BPP and Doppler studies weekly.  Placental insufficiency causing fetal growth restriction can be a heralding sign of pre-eclampsia.  She has no evidence of this today, but should be observed for possible development as pregnancy progresses.  We discussed in detail symptoms of pre-eclampsia. She has had this before with some of her prior pregnancies.       Summary of Plan  -Repeat Growth in 4 weeks  -Thursday BPP/dopplers MFM, Monday BPP or NST at primary OB recommended.  -Careful attention to fetal movement and symptoms of pre-eclampsia  -Continue to follow with OB for prenatal care  -Delivery 38 weeks unless indicated sooner.     Follow-up: Follow up weekly    Thank you for the consult and opportunity to care for this patient.  Please feel free to reach out with any  questions or concerns.      I spent 20 minutes caring for this patient on this date of service. This time includes time spent by me in the following activities: preparing for the visit, reviewing tests, obtaining and/or reviewing a separately obtained history, performing a medically appropriate examination and/or evaluation, counseling and educating the patient/family/caregiver and independently interpreting results and communicating that information with the patient/family/caregiver with greater than 50% spent in counseling and coordination of care.     TAIWO Olmedo  Maternal Fetal Medicine-[facility]  Office: [phone]  [email or web address]

## 2024-06-20 NOTE — PROGRESS NOTES
Pt reports that she is doing well and denies vaginal bleeding, cramping, contractions or LOF at this time. Reports active fetal movement. Reviewed when to call OB office or present to L&D for evaluation with symptoms such as decreased fetal movement, vaginal bleeding, LOF or ctxs. Pt verbalized understanding. Denies HA, visual changes or epigastric pain. Denies any additional complaints at time of appointment. Next OB appointment scheduled for 06/24/2024.    Vitals:    06/20/24 1318   BP: 132/67   Pulse: 79   Temp: 98.4 °F (36.9 °C)   SpO2: 97%

## 2024-06-20 NOTE — LETTER
2024     Mason Aquino DO  [address]  [address]  [facility] [address]    Patient: Raina Grigsby   YOB: 1990   Date of Visit: 2024       Dear Mason Aquino DO    Raina Grisgby was in my office today. Below is a copy of my note.    If you have questions, please do not hesitate to call me. I look forward to following Raina along with you.         Sincerely,        TAIWO Mantilla        CC: No Recipients                          Pt reports that she is doing well and denies vaginal bleeding, cramping, contractions or LOF at this time. Reports active fetal movement. Reviewed when to call OB office or present to L&D for evaluation with symptoms such as decreased fetal movement, vaginal bleeding, LOF or ctxs. Pt verbalized understanding. Denies HA, visual changes or epigastric pain. Denies any additional complaints at time of appointment. Next OB appointment scheduled for 2024.    Vitals:    24 1318   BP: 132/67   Pulse: 79   Temp: 98.4 °F (36.9 °C)   SpO2: 97%          MATERNAL FETAL MEDICINE Consult Note    Dear Dr Mason Aquino DO:    Thank you for your kind referral of Raina Grigsby.  As you know, she is a 33 y.o.   at  34 4/7 weeks gestation (Estimated Date of Delivery: 24). This is a consult.      Her antepartum course is complicated by:  FGR, normal dopplers  Grand multip  Hx of preE and abruption with previous pregnancy    Aneuploidy Screening: low risk    HPI: Today, she denies headache, blurry vision, RUQ pain. No vaginal bleeding, no contractions.     Review of History:  Past Medical History:   Diagnosis Date   • Gestational hypertension 3/30/2023   • Hidradenitis      Past Surgical History:   Procedure Laterality Date   •  SECTION N/A 3/30/2023    Procedure:  SECTION PRIMARY;  Surgeon: Ester Hudson MD;  Location: [facility] LABOR DELIVERY;  Service: Obstetrics/Gynecology;  Laterality:  N/A;   • GALLBLADDER SURGERY     • TONSILLECTOMY     • WISDOM TOOTH EXTRACTION       Social History     Socioeconomic History   • Marital status: Single   Tobacco Use   • Smoking status: Former     Current packs/day: 0.50     Types: Cigarettes   • Smokeless tobacco: Never   Vaping Use   • Vaping status: Never Used   Substance and Sexual Activity   • Alcohol use: Never   • Drug use: Not Currently     Types: Marijuana   • Sexual activity: Yes     Partners: Male     Family History   Problem Relation Age of Onset   • Alcohol abuse Mother    • Hypertension Father    • COPD Father    • Diabetes Father         Recently diagnosed   • Breast cancer Maternal Grandmother    • Diabetes Paternal Grandmother    • Prostate cancer Paternal Grandfather       Allergies   Allergen Reactions   • Egg White (Egg Protein) Anaphylaxis   • Ibuprofen Hives   • Peanut (Diagnostic) Anaphylaxis   • Lactose Intolerance [Tilactase] Diarrhea     Pt says she can eat cheese      Current Outpatient Medications on File Prior to Visit   Medication Sig Dispense Refill   • acetaminophen (TYLENOL) 500 MG tablet Take 1 tablet by mouth Every 6 (Six) Hours As Needed for Mild Pain.     • aspirin 81 MG chewable tablet Chew 1 tablet Daily. 30 tablet 11   • ferrous gluconate (FERGON) 324 MG tablet Take 1 tablet by mouth Daily With Breakfast. 100 tablet 2   • ondansetron ODT (ZOFRAN-ODT) 4 MG disintegrating tablet Place 1 tablet on the tongue Every 8 (Eight) Hours As Needed for Nausea or Vomiting. 30 tablet 2   • Prenatal Vit-Fe Fumarate-FA (prenatal vitamin 27-0.8) 27-0.8 MG tablet tablet Take  by mouth Daily.       No current facility-administered medications on file prior to visit.      Past obstetric, gynecological, medical, surgical, family and social history reviewed.  Relevant lab work and imaging reviewed.    Review of systems  Constitutional:  denies fever, chills, malaise.   ENT/Mouth:  denies sore throat, tinnitus  Eyes: denies vision changes/pain  CV:  " denies chest pain  Respiratory:  denies cough/SOB  GI:  denies N/V, diarrhea, abdominal pain.    :   denies dysuria  Skin:  denies lesions or pruritus   Neuro:  denies weakness, focal neurologic symptoms    Vitals:    24 1318   BP: 132/67   BP Location: Right arm   Patient Position: Sitting   Pulse: 79   Temp: 98.4 °F (36.9 °C)   TempSrc: Temporal   SpO2: 97%   Weight: 78 kg (172 lb)   Height: 160 cm (63\")     PHYSICAL EXAM   GENERAL: Not in acute distress, AAOx3, pleasant  CARDIO: regular rate and rhythm  PULM: symmetric chest rise, speaking in complete sentences without difficulty  NEURO: awake, alert and oriented to person, place, and time  ABDOMINAL: No fundal tenderness, no rebound or guarding, gravid  EXTREMITIES: no bilateral lower extremity edema/tenderness  SKIN: Warm, well-perfused    ULTRASOUND   Please view full ultrasound note on Imaging tab in ViewPoint.  Cephalic presentation,.  Posterior placenta.  VINCENT 16. 1  cm, which is normal.   BPP 8/8   Normal UA dopplers without elevated, absent or reversed flow visualized.     ASSESSMENT/COUNSELIN y.o.   at  34 4/7 weeks gestation (Estimated Date of Delivery: 24).    -Pregnancy  [ X ] stable  [   ] improving [  ] worsening    Diagnoses and all orders for this visit:    1. Grand multipara (Primary)    2. Fetal growth restriction antepartum        FGR, NORMAL DOPPLERS  Hx of FGR in all prior pregnancies  Stopped smoking 1 month ago--congratulated.  Previously counseled.   I think the most likely cause of this is placental insufficiency vs constitutional.  Timing of delivery is guided by the results  testing of fetal well being (ultrasound BPP, FHR tracing findings) in addition to Doppler findings (umbilical artery, MCA, and ductus venosus).  Today we have normal umbilical artery dopplers with an 8/8 BPP which is reassuring.  We discussed umbilical artery doppler studies as 4 possible levels: normal, elevated, absent, and reversed.  " We discussed that absent and reversed would require admission,  corticosteroids, and possibly delivery depending on gestational age and other testing.         I would recommend limitation of activities, daily fetal movement assessment, and twice weekly ANFS with BPP and Doppler studies weekly.  Placental insufficiency causing fetal growth restriction can be a heralding sign of pre-eclampsia.  She has no evidence of this today, but should be observed for possible development as pregnancy progresses.  We discussed in detail symptoms of pre-eclampsia. She has had this before with some of her prior pregnancies.       Summary of Plan  -Repeat Growth in 4 weeks  -Thursday BPP/dopplers MFM, Monday BPP or NST at primary OB recommended.  -Careful attention to fetal movement and symptoms of pre-eclampsia  -Continue to follow with OB for prenatal care  -Delivery 38 weeks unless indicated sooner.     Follow-up: Follow up weekly    Thank you for the consult and opportunity to care for this patient.  Please feel free to reach out with any questions or concerns.      I spent 20 minutes caring for this patient on this date of service. This time includes time spent by me in the following activities: preparing for the visit, reviewing tests, obtaining and/or reviewing a separately obtained history, performing a medically appropriate examination and/or evaluation, counseling and educating the patient/family/caregiver and independently interpreting results and communicating that information with the patient/family/caregiver with greater than 50% spent in counseling and coordination of care.     TAIWO Olmedo  Maternal Fetal Medicine-[facility]  Office: [phone]  [email or web address]

## 2024-06-24 ENCOUNTER — ROUTINE PRENATAL (OUTPATIENT)
Dept: OBSTETRICS AND GYNECOLOGY | Facility: CLINIC | Age: 34
End: 2024-06-24
Payer: COMMERCIAL

## 2024-06-24 ENCOUNTER — TELEPHONE (OUTPATIENT)
Dept: OBSTETRICS AND GYNECOLOGY | Facility: CLINIC | Age: 34
End: 2024-06-24

## 2024-06-24 ENCOUNTER — PREP FOR SURGERY (OUTPATIENT)
Dept: OTHER | Facility: HOSPITAL | Age: 34
End: 2024-06-24
Payer: COMMERCIAL

## 2024-06-24 VITALS — BODY MASS INDEX: 30.82 KG/M2 | WEIGHT: 174 LBS | DIASTOLIC BLOOD PRESSURE: 74 MMHG | SYSTOLIC BLOOD PRESSURE: 136 MMHG

## 2024-06-24 DIAGNOSIS — Z30.2 REQUEST FOR STERILIZATION: Primary | ICD-10-CM

## 2024-06-24 DIAGNOSIS — Z36.9 ENCOUNTER FOR ANTENATAL SCREENING, UNSPECIFIED: Primary | ICD-10-CM

## 2024-06-24 DIAGNOSIS — Z98.891 S/P EMERGENCY CESAREAN SECTION: ICD-10-CM

## 2024-06-24 PROCEDURE — 99214 OFFICE O/P EST MOD 30 MIN: CPT | Performed by: STUDENT IN AN ORGANIZED HEALTH CARE EDUCATION/TRAINING PROGRAM

## 2024-06-24 RX ORDER — SODIUM CHLORIDE, SODIUM LACTATE, POTASSIUM CHLORIDE, CALCIUM CHLORIDE 600; 310; 30; 20 MG/100ML; MG/100ML; MG/100ML; MG/100ML
125 INJECTION, SOLUTION INTRAVENOUS CONTINUOUS
OUTPATIENT
Start: 2024-06-24

## 2024-06-24 RX ORDER — LIDOCAINE HYDROCHLORIDE 10 MG/ML
0.5 INJECTION, SOLUTION INFILTRATION; PERINEURAL ONCE AS NEEDED
OUTPATIENT
Start: 2024-06-24

## 2024-06-24 RX ORDER — CARBOPROST TROMETHAMINE 250 UG/ML
250 INJECTION, SOLUTION INTRAMUSCULAR
OUTPATIENT
Start: 2024-06-24

## 2024-06-24 RX ORDER — KETOROLAC TROMETHAMINE 30 MG/ML
30 INJECTION, SOLUTION INTRAMUSCULAR; INTRAVENOUS ONCE
OUTPATIENT
Start: 2024-06-24 | End: 2024-06-24

## 2024-06-24 RX ORDER — OXYTOCIN/0.9 % SODIUM CHLORIDE 30/500 ML
250 PLASTIC BAG, INJECTION (ML) INTRAVENOUS CONTINUOUS
OUTPATIENT
Start: 2024-06-24 | End: 2024-06-24

## 2024-06-24 RX ORDER — SODIUM CHLORIDE 9 MG/ML
40 INJECTION, SOLUTION INTRAVENOUS AS NEEDED
OUTPATIENT
Start: 2024-06-24

## 2024-06-24 RX ORDER — SODIUM CHLORIDE 0.9 % (FLUSH) 0.9 %
10 SYRINGE (ML) INJECTION AS NEEDED
OUTPATIENT
Start: 2024-06-24

## 2024-06-24 RX ORDER — MISOPROSTOL 200 UG/1
800 TABLET ORAL ONCE AS NEEDED
OUTPATIENT
Start: 2024-06-24

## 2024-06-24 RX ORDER — ACETAMINOPHEN 500 MG
1000 TABLET ORAL ONCE
OUTPATIENT
Start: 2024-06-24 | End: 2024-06-24

## 2024-06-24 RX ORDER — METHYLERGONOVINE MALEATE 0.2 MG/ML
200 INJECTION INTRAVENOUS ONCE AS NEEDED
OUTPATIENT
Start: 2024-06-24

## 2024-06-24 RX ORDER — SODIUM CHLORIDE 0.9 % (FLUSH) 0.9 %
10 SYRINGE (ML) INJECTION EVERY 12 HOURS SCHEDULED
OUTPATIENT
Start: 2024-06-24

## 2024-06-24 RX ORDER — OXYTOCIN/0.9 % SODIUM CHLORIDE 30/500 ML
999 PLASTIC BAG, INJECTION (ML) INTRAVENOUS ONCE
OUTPATIENT
Start: 2024-06-24

## 2024-06-24 NOTE — PROGRESS NOTES
OB follow up > 20 weeks    CC:  Here for prenatal follow up    Raina Grigsby is a 33 y.o.  35+ being seen today for her obstetrical visit.  She reports good fetal movement. She sees MFM on Thursday and this office on Monday. She c/o swelling.     Review of Systems  Genitourinary: Neg for cramping, vaginal bleeding, SROM, or dysuria.     /74   Wt 78.9 kg (174 lb)   LMP  (LMP Unknown)   BMI 30.82 kg/m²     Vitals: VSS; AF    General Appearance:  Awake. Alert. Well developed. Well nourished. In no acute distress.    Visual Inspection: ° Abdomen was normal on visual inspection.  Palpation: ° Abdomen was soft. ° Abdominal non-tender.    Uterus: ° Fundal height was normal for gestational age. ° Not tender.  Uterine Adnexae: ° Normal without masses or tenderness.  Neurological:  ° Oriented to time, place, and person.  Skin:  ° General appearance was normal. No bruising or ecchymosis.  Obstetrical: +FM and FCA     Presentation: VTX  Placenta: Posterior  VINCENT: 14cm  FCA: 130's    EFW  23%  AC 9%     S/D ration WNL         Ultrasound images and report reviewed personally by me.    Full interpretation of ultrasound can be found in the patient's note on the same date of service.     Mason Aquino, DO     Assessment    1) Pregnancy at 35+  BPP today .     2) T21 neg. CF/SMA/FX neg.  AFP neg    3) Smoker- Quit! Approx 1 month ago.     4) UDS + for THC- Repeat UDS remains + for THC, enc no use in pregnancy.     5) UTI x 1- unable to finish all abx; repeat Urine cx neg 24     6) Grand multipara- The patient understands that she is a grand multipara because she has given birth to 5 or more infants which increases her risk during pregnancy for factors including but not limited to abnormal fetal presentation, precipitate delivery, uterine atony, placenta previa, uterine rupture, postpartum hemorrhage, etc.     7)  Hx of pre-E and GHTN- Is not taking baby ASA- refuses.  CMP WNL; Pr/Cr ratio= 204 on  5/20/24   Trace protein and ketones noted on urine dip today; occasional headaches but denies visual changes. Rev warn s/s.      8)  Hx of C/S- emergent due to placental abruption; desires repeat. Plan  @ 38 weeks per MFM.      9)  Desires sterilization- Tubal consent signed and in the chart. Desires bilateral salpingectomy.      10) N/V- improving; has Zofran if needed-     11) H/O PPROM @ 36 weeks with 5th child- Did not do Yeoman or vaginal progesterone with consecutive pregnancies      12) Short interval between pregnancy- C/S 3/2023; CL 4.0cm @ 16wga    13) tachycardia- Continues to occur occasion. May be having palpitations as well. Mainly occurs with activity. Her apple watch indicates a normal heart rate of 70s with increase to 120s.  Thyroid panel WNL; EKG re-ordered but not completed. Hgb 11.7g/dL.     14) S/P tDap     15) Anemia- Taking iron. Hgb 11.7g/dL.     16) Pelvic pressure- Enc pregnancy support belt. CL 3.37cm @ 30 weeks      17) Poor fetal growth- Overall growth 16%,AC 4% on 6/13/24 compared to growth 21%, AC 5% on 5/30/24. S/P MFM consult.  Recommend delivery 38wga   Scheduled RLTCS and BS today for 23Pyh22       Summary of Plan 6/13/24  -Repeat Growth in 4 weeks  -Thursday BPP/dopplers MFM, Monday BPP or NST at primary OB recommended.  -Careful attention to fetal movement and symptoms of pre-eclampsia  -Continue to follow with OB for prenatal care  -Delivery 38 weeks unless indicated sooner.      Plan    Continue prenatal vitamins   Reviewed this stage of pregnancy  Problem list updated   Follow up in 1 weeks for BPP , GBS   Scheduled RLTCS and BS today for [id number]     Parts of this document have been copied or forwarded from her previous visits and have been reviewed, updated and edited as indicated.      Mason Aquino DO  6/24/2024  11:19 EDT

## 2024-06-27 ENCOUNTER — HOSPITAL ENCOUNTER (OUTPATIENT)
Dept: ULTRASOUND IMAGING | Facility: HOSPITAL | Age: 34
Discharge: HOME | End: 2024-06-27
Admitting: OBSTETRICS & GYNECOLOGY
Payer: COMMERCIAL

## 2024-06-27 ENCOUNTER — OFFICE VISIT (OUTPATIENT)
Dept: OBSTETRICS AND GYNECOLOGY | Facility: CLINIC | Age: 34
End: 2024-06-27
Payer: COMMERCIAL

## 2024-06-27 VITALS
OXYGEN SATURATION: 98 % | TEMPERATURE: 98.7 F | HEART RATE: 73 BPM | BODY MASS INDEX: 30.62 KG/M2 | SYSTOLIC BLOOD PRESSURE: 125 MMHG | WEIGHT: 172.8 LBS | HEIGHT: 63 IN | DIASTOLIC BLOOD PRESSURE: 70 MMHG

## 2024-06-27 DIAGNOSIS — Z64.1 GRAND MULTIPARA: Primary | ICD-10-CM

## 2024-06-27 DIAGNOSIS — O36.5991 POOR FETAL GROWTH AFFECTING MANAGEMENT OF MOTHER, ANTEPARTUM, FETUS 1 OF MULTIPLE GESTATION: ICD-10-CM

## 2024-06-27 DIAGNOSIS — O09.299 HX OF PREECLAMPSIA, PRIOR PREGNANCY, CURRENTLY PREGNANT: ICD-10-CM

## 2024-06-27 DIAGNOSIS — O36.5990 FETAL GROWTH RESTRICTION ANTEPARTUM: ICD-10-CM

## 2024-06-27 PROCEDURE — 76819 FETAL BIOPHYS PROFIL W/O NST: CPT

## 2024-06-27 PROCEDURE — 76820 UMBILICAL ARTERY ECHO: CPT

## 2024-06-27 NOTE — LETTER
2024     Mason Aquino DO  [address]  [address]  [facility] [address]    Patient: Raina Grigsby   YOB: 1990   Date of Visit: 2024       Dear Mason Aquino DO    Raina Grigsby was in my office today. Below is a copy of my note.    If you have questions, please do not hesitate to call me. I look forward to following Raina along with you.         Sincerely,        TAIWO Mantilla        CC: No Recipients                          MATERNAL FETAL MEDICINE Consult Note    Dear Dr Mason Aquino DO:    Thank you for your kind referral of Raina Grigsby.  As you know, she is a 33 y.o.   at  35 4/7 weeks gestation (Estimated Date of Delivery: 24). This is a consult.      Her antepartum course is complicated by:  FGR, normal dopplers  Grand multip  Hx of preE and abruption with previous pregnancy    Aneuploidy Screening: low risk    HPI: Today, she denies headache, blurry vision, RUQ pain. No vaginal bleeding, no contractions.     Review of History:  Past Medical History:   Diagnosis Date   • Gestational hypertension 3/30/2023   • Hidradenitis      Past Surgical History:   Procedure Laterality Date   •  SECTION N/A 3/30/2023    Procedure:  SECTION PRIMARY;  Surgeon: Ester Hudson MD;  Location: [facility] LABOR DELIVERY;  Service: Obstetrics/Gynecology;  Laterality: N/A;   • GALLBLADDER SURGERY     • TONSILLECTOMY     • WISDOM TOOTH EXTRACTION       Social History     Socioeconomic History   • Marital status: Single   Tobacco Use   • Smoking status: Former     Current packs/day: 0.50     Types: Cigarettes   • Smokeless tobacco: Never   Vaping Use   • Vaping status: Never Used   Substance and Sexual Activity   • Alcohol use: Never   • Drug use: Not Currently     Types: Marijuana   • Sexual activity: Yes     Partners: Male     Family History   Problem Relation Age of Onset   • Alcohol abuse Mother    • Hypertension  "Father    • COPD Father    • Diabetes Father         Recently diagnosed   • Breast cancer Maternal Grandmother    • Diabetes Paternal Grandmother    • Prostate cancer Paternal Grandfather       Allergies   Allergen Reactions   • Egg White (Egg Protein) Anaphylaxis   • Ibuprofen Hives   • Peanut (Diagnostic) Anaphylaxis   • Lactose Intolerance [Tilactase] Diarrhea     Pt says she can eat cheese      Current Outpatient Medications on File Prior to Visit   Medication Sig Dispense Refill   • acetaminophen (TYLENOL) 500 MG tablet Take 1 tablet by mouth Every 6 (Six) Hours As Needed for Mild Pain.     • aspirin 81 MG chewable tablet Chew 1 tablet Daily. 30 tablet 11   • ferrous gluconate (FERGON) 324 MG tablet Take 1 tablet by mouth Daily With Breakfast. 100 tablet 2   • ondansetron ODT (ZOFRAN-ODT) 4 MG disintegrating tablet Place 1 tablet on the tongue Every 8 (Eight) Hours As Needed for Nausea or Vomiting. 30 tablet 2   • Prenatal Vit-Fe Fumarate-FA (prenatal vitamin 27-0.8) 27-0.8 MG tablet tablet Take  by mouth Daily.       No current facility-administered medications on file prior to visit.      Past obstetric, gynecological, medical, surgical, family and social history reviewed.  Relevant lab work and imaging reviewed.    Review of systems  Constitutional:  denies fever, chills, malaise.   ENT/Mouth:  denies sore throat, tinnitus  Eyes: denies vision changes/pain  CV:  denies chest pain  Respiratory:  denies cough/SOB  GI:  denies N/V, diarrhea, abdominal pain.    :   denies dysuria  Skin:  denies lesions or pruritus   Neuro:  denies weakness, focal neurologic symptoms    Vitals:    06/27/24 0852   BP: 125/70   BP Location: Right arm   Patient Position: Sitting   Pulse: 73   Temp: 98.7 °F (37.1 °C)   TempSrc: Temporal   SpO2: 98%   Weight: 78.4 kg (172 lb 12.8 oz)   Height: 160 cm (63\")       PHYSICAL EXAM   GENERAL: Not in acute distress, AAOx3, pleasant  CARDIO: regular rate and rhythm  PULM: symmetric chest " rise, speaking in complete sentences without difficulty  NEURO: awake, alert and oriented to person, place, and time  ABDOMINAL: No fundal tenderness, no rebound or guarding, gravid  EXTREMITIES: no bilateral lower extremity edema/tenderness  SKIN: Warm, well-perfused    ULTRASOUND   Please view full ultrasound note on Imaging tab in ViewPoint.  Cephalic presentation.  Posterior placenta.  VINCENT 16 cm, which is normal.  BPP 8/8  UA dopplers forward flow without absent or reversed end diastolic flow noted.    ASSESSMENT/COUNSELIN y.o.   at  35 4/7 weeks gestation (Estimated Date of Delivery: 24).    -Pregnancy  [ X ] stable  [   ] improving [  ] worsening    Diagnoses and all orders for this visit:    1. Grand multipara (Primary)    2. Fetal growth restriction antepartum    3. Hx of preeclampsia, prior pregnancy, currently pregnant      FGR, NORMAL DOPPLERS  Hx of FGR in all prior pregnancies  Stopped smoking 1 month ago--congratulated.  Previously counseled.   I think the most likely cause of this is placental insufficiency vs constitutional.  Timing of delivery is guided by the results  testing of fetal well being (ultrasound BPP, FHR tracing findings) in addition to Doppler findings (umbilical artery, MCA, and ductus venosus).  Today we have normal umbilical artery dopplers with an 8/8 BPP which is reassuring.  We discussed umbilical artery doppler studies as 4 possible levels: normal, elevated, absent, and reversed.  We discussed that absent and reversed would require admission,  corticosteroids, and possibly delivery depending on gestational age and other testing.         I would recommend limitation of activities, daily fetal movement assessment, and twice weekly ANFS with BPP and Doppler studies weekly.  Placental insufficiency causing fetal growth restriction can be a heralding sign of pre-eclampsia.  She has no evidence of this today, but should be observed for possible  development as pregnancy progresses.  We discussed in detail symptoms of pre-eclampsia. She has had this before with some of her prior pregnancies.       Summary of Plan  -Repeat Growth in 4 weeks  -Thursday BPP/dopplers MFM, Monday BPP or NST at primary OB recommended.  -Careful attention to fetal movement and symptoms of pre-eclampsia  -Continue to follow with OB for prenatal care  -Delivery 38 weeks unless indicated sooner.     Follow-up: Follow up weekly    Thank you for the consult and opportunity to care for this patient.  Please feel free to reach out with any questions or concerns.      I spent 20 minutes caring for this patient on this date of service. This time includes time spent by me in the following activities: preparing for the visit, reviewing tests, obtaining and/or reviewing a separately obtained history, performing a medically appropriate examination and/or evaluation, counseling and educating the patient/family/caregiver and independently interpreting results and communicating that information with the patient/family/caregiver with greater than 50% spent in counseling and coordination of care.     TAIWO Olmedo  Maternal Fetal Medicine-[facility]  Office: [phone]  [email or web address]    Pt reports that she is doing well and denies vaginal bleeding, cramping, contractions or LOF at this time. Reports active fetal movement. Reviewed when to call OB office or present to L&D for evaluation with symptoms such as decreased fetal movement, vaginal bleeding, LOF or ctxs. Pt verbalized understanding. Denies visual changes or epigastric pain. Notes irregular HA relieved by rest or Tylenol. Denies any additional complaints at time of appointment. Next OB appointment scheduled for 7/1. C/S 7/15!    Vitals:    06/27/24 0852   BP: 125/70   Pulse: 73   Temp: 98.7 °F (37.1 °C)   SpO2: 98%

## 2024-06-27 NOTE — PROGRESS NOTES
Pt reports that she is doing well and denies vaginal bleeding, cramping, contractions or LOF at this time. Reports active fetal movement. Reviewed when to call OB office or present to L&D for evaluation with symptoms such as decreased fetal movement, vaginal bleeding, LOF or ctxs. Pt verbalized understanding. Denies visual changes or epigastric pain. Notes irregular HA relieved by rest or Tylenol. Denies any additional complaints at time of appointment. Next OB appointment scheduled for 7/1. C/S 7/15!    Vitals:    06/27/24 0852   BP: 125/70   Pulse: 73   Temp: 98.7 °F (37.1 °C)   SpO2: 98%

## 2024-06-27 NOTE — PROGRESS NOTES
MATERNAL FETAL MEDICINE Consult Note    Dear Dr Mason Aquino, DO:    Thank you for your kind referral of Raina Grigsby.  As you know, she is a 33 y.o.   at  35 4/7 weeks gestation (Estimated Date of Delivery: 24). This is a consult.      Her antepartum course is complicated by:  FGR, normal dopplers  Grand multip  Hx of preE and abruption with previous pregnancy    Aneuploidy Screening: low risk    HPI: Today, she denies headache, blurry vision, RUQ pain. No vaginal bleeding, no contractions.     Review of History:  Past Medical History:   Diagnosis Date    Gestational hypertension 3/30/2023    Hidradenitis      Past Surgical History:   Procedure Laterality Date     SECTION N/A 3/30/2023    Procedure:  SECTION PRIMARY;  Surgeon: Ester Hudson MD;  Location: [facility] LABOR DELIVERY;  Service: Obstetrics/Gynecology;  Laterality: N/A;    GALLBLADDER SURGERY      TONSILLECTOMY      WISDOM TOOTH EXTRACTION       Social History     Socioeconomic History    Marital status: Single   Tobacco Use    Smoking status: Former     Current packs/day: 0.50     Types: Cigarettes    Smokeless tobacco: Never   Vaping Use    Vaping status: Never Used   Substance and Sexual Activity    Alcohol use: Never    Drug use: Not Currently     Types: Marijuana    Sexual activity: Yes     Partners: Male     Family History   Problem Relation Age of Onset    Alcohol abuse Mother     Hypertension Father     COPD Father     Diabetes Father         Recently diagnosed    Breast cancer Maternal Grandmother     Diabetes Paternal Grandmother     Prostate cancer Paternal Grandfather       Allergies   Allergen Reactions    Egg White (Egg Protein) Anaphylaxis    Ibuprofen Hives    Peanut (Diagnostic) Anaphylaxis    Lactose Intolerance [Tilactase] Diarrhea     Pt says she can eat cheese      Current Outpatient Medications on File Prior to Visit   Medication Sig Dispense Refill    acetaminophen (TYLENOL) 500  "MG tablet Take 1 tablet by mouth Every 6 (Six) Hours As Needed for Mild Pain.      aspirin 81 MG chewable tablet Chew 1 tablet Daily. 30 tablet 11    ferrous gluconate (FERGON) 324 MG tablet Take 1 tablet by mouth Daily With Breakfast. 100 tablet 2    ondansetron ODT (ZOFRAN-ODT) 4 MG disintegrating tablet Place 1 tablet on the tongue Every 8 (Eight) Hours As Needed for Nausea or Vomiting. 30 tablet 2    Prenatal Vit-Fe Fumarate-FA (prenatal vitamin 27-0.8) 27-0.8 MG tablet tablet Take  by mouth Daily.       No current facility-administered medications on file prior to visit.      Past obstetric, gynecological, medical, surgical, family and social history reviewed.  Relevant lab work and imaging reviewed.    Review of systems  Constitutional:  denies fever, chills, malaise.   ENT/Mouth:  denies sore throat, tinnitus  Eyes: denies vision changes/pain  CV:  denies chest pain  Respiratory:  denies cough/SOB  GI:  denies N/V, diarrhea, abdominal pain.    :   denies dysuria  Skin:  denies lesions or pruritus   Neuro:  denies weakness, focal neurologic symptoms    Vitals:    06/27/24 0852   BP: 125/70   BP Location: Right arm   Patient Position: Sitting   Pulse: 73   Temp: 98.7 °F (37.1 °C)   TempSrc: Temporal   SpO2: 98%   Weight: 78.4 kg (172 lb 12.8 oz)   Height: 160 cm (63\")       PHYSICAL EXAM   GENERAL: Not in acute distress, AAOx3, pleasant  CARDIO: regular rate and rhythm  PULM: symmetric chest rise, speaking in complete sentences without difficulty  NEURO: awake, alert and oriented to person, place, and time  ABDOMINAL: No fundal tenderness, no rebound or guarding, gravid  EXTREMITIES: no bilateral lower extremity edema/tenderness  SKIN: Warm, well-perfused    ULTRASOUND   Please view full ultrasound note on Imaging tab in ViewPoint.  Cephalic presentation.  Posterior placenta.  VINCENT 16 cm, which is normal.  BPP 8/8  UA dopplers forward flow without absent or reversed end diastolic flow " noted.    ASSESSMENT/COUNSELIN y.o.   at  35 4/7 weeks gestation (Estimated Date of Delivery: 24).    -Pregnancy  [ X ] stable  [   ] improving [  ] worsening    Diagnoses and all orders for this visit:    1. Grand multipara (Primary)    2. Fetal growth restriction antepartum    3. Hx of preeclampsia, prior pregnancy, currently pregnant      FGR, NORMAL DOPPLERS  Hx of FGR in all prior pregnancies  Stopped smoking 1 month ago--congratulated.  Previously counseled.   I think the most likely cause of this is placental insufficiency vs constitutional.  Timing of delivery is guided by the results  testing of fetal well being (ultrasound BPP, FHR tracing findings) in addition to Doppler findings (umbilical artery, MCA, and ductus venosus).  Today we have normal umbilical artery dopplers with an 8/8 BPP which is reassuring.  We discussed umbilical artery doppler studies as 4 possible levels: normal, elevated, absent, and reversed.  We discussed that absent and reversed would require admission,  corticosteroids, and possibly delivery depending on gestational age and other testing.         I would recommend limitation of activities, daily fetal movement assessment, and twice weekly ANFS with BPP and Doppler studies weekly.  Placental insufficiency causing fetal growth restriction can be a heralding sign of pre-eclampsia.  She has no evidence of this today, but should be observed for possible development as pregnancy progresses.  We discussed in detail symptoms of pre-eclampsia. She has had this before with some of her prior pregnancies.       Summary of Plan  -Repeat Growth in 4 weeks  -Thursday BPP/dopplers MFM, Monday BPP or NST at primary OB recommended.  -Careful attention to fetal movement and symptoms of pre-eclampsia  -Continue to follow with OB for prenatal care  -Delivery 38 weeks unless indicated sooner.     Follow-up: Follow up weekly    Thank you for the consult and opportunity to  care for this patient.  Please feel free to reach out with any questions or concerns.      I spent 20 minutes caring for this patient on this date of service. This time includes time spent by me in the following activities: preparing for the visit, reviewing tests, obtaining and/or reviewing a separately obtained history, performing a medically appropriate examination and/or evaluation, counseling and educating the patient/family/caregiver and independently interpreting results and communicating that information with the patient/family/caregiver with greater than 50% spent in counseling and coordination of care.     TAIWO Olmedo  Maternal Fetal Medicine-[facility]  Office: [phone]  [email or web address]

## 2024-06-28 LAB — B-HEM STREP SPEC QL CULT: NEGATIVE

## 2024-07-01 ENCOUNTER — ROUTINE PRENATAL (OUTPATIENT)
Dept: OBSTETRICS AND GYNECOLOGY | Facility: CLINIC | Age: 34
End: 2024-07-01
Payer: COMMERCIAL

## 2024-07-01 VITALS — BODY MASS INDEX: 31.11 KG/M2 | SYSTOLIC BLOOD PRESSURE: 134 MMHG | WEIGHT: 175.6 LBS | DIASTOLIC BLOOD PRESSURE: 72 MMHG

## 2024-07-01 DIAGNOSIS — Z36.9 ENCOUNTER FOR ANTENATAL SCREENING, UNSPECIFIED: Primary | ICD-10-CM

## 2024-07-01 NOTE — PROGRESS NOTES
OB follow up > 20 weeks    CC:  Here for prenatal follow up    Raina Grigsby is a 33 y.o.  36+ being seen today for her obstetrical visit.  She reports good fetal movement. Saw MFM . She c/o swelling Left foot; no pain or SOB or CP .     Review of Systems  Genitourinary: Neg for cramping, vaginal bleeding, SROM, or dysuria.     /72   Wt 79.7 kg (175 lb 9.6 oz)   LMP  (LMP Unknown)   BMI 31.11 kg/m²     Vitals: VSS; AF    General Appearance:  Awake. Alert. Well developed. Well nourished. In no acute distress.    Visual Inspection: ° Abdomen was normal on visual inspection.  Palpation: ° Abdomen was soft. ° Abdominal non-tender.    Uterus: ° Fundal height was normal for gestational age. ° Not tender.  Uterine Adnexae: ° Normal without masses or tenderness.  Neurological:  ° Oriented to time, place, and person.  Skin:  ° General appearance was normal. No bruising or ecchymosis.  Obstetrical: +FM and FCA     Presentation: VTX  Placenta: Fundal  VINCENT: 14cm  FCA: 140's      BPP         Ultrasound images and report reviewed personally by me.    Full interpretation of ultrasound can be found in the patient's note on the same date of service.     Mason Aquino, DO     Assessment    1) Pregnancy at 36+  BPP today   GBS neg     2) T21 neg. CF/SMA/FX neg.  AFP neg    3) Smoker- Quit! Approx 1 month ago.     4) UDS + for THC- Repeat UDS remains + for THC, enc no use in pregnancy.     5) UTI x 1- unable to finish all abx; repeat Urine cx neg 24     6) Grand multipara- The patient understands that she is a grand multipara because she has given birth to 5 or more infants which increases her risk during pregnancy for factors including but not limited to abnormal fetal presentation, precipitate delivery, uterine atony, placenta previa, uterine rupture, postpartum hemorrhage, etc.     7)  Hx of pre-E and GHTN- Is not taking baby ASA- refuses.  CMP WNL; Pr/Cr ratio= 204 on 24   Trace protein and  ketones noted on urine dip today; occasional headaches but denies visual changes. Rev warn s/s.      8)  Hx of C/S- emergent due to placental abruption; desires repeat. Plan  @ 38 weeks per MFM.      9)  Desires sterilization- Tubal consent signed and in the chart. Desires bilateral salpingectomy.      10) N/V- improving; has Zofran if needed-     11) H/O PPROM @ 36 weeks with 5th child- Did not do Appling or vaginal progesterone with consecutive pregnancies      12) Short interval between pregnancy- C/S 3/2023; CL 4.0cm @ 16wga    13) tachycardia- Continues to occur occasion. May be having palpitations as well. Mainly occurs with activity. Her apple watch indicates a normal heart rate of 70s with increase to 120s.  Thyroid panel WNL; EKG re-ordered but not completed. Hgb 11.7g/dL.     14) S/P tDap     15) Anemia- Taking iron. Hgb 11.7g/dL.     16) Pelvic pressure- Enc pregnancy support belt. CL 3.37cm @ 30 weeks      17) Poor fetal growth- Overall growth 16%,AC 4% on 6/13/24 compared to growth 21%, AC 5% on 5/30/24. S/P MFM consult.  Recommend delivery 38wga   Scheduled RLTCS and BS today for [id number]   24Jun growth EFW  23%  AC 9%       Summary of Plan 6/13/24  -Repeat Growth in 4 weeks  -Thursday BPP/dopplers MFM, Monday BPP or NST at primary OB recommended.  -Careful attention to fetal movement and symptoms of pre-eclampsia  -Continue to follow with OB for prenatal care  -Delivery 38 weeks unless indicated sooner.      Plan    Continue prenatal vitamins   Reviewed this stage of pregnancy  Problem list updated   Follow up in 1 weeks for BPP ( Will see MFM on Friday as Thursday is 4Jul)   Scheduled RLTCS and BS  for [id number]     Parts of this document have been copied or forwarded from her previous visits and have been reviewed, updated and edited as indicated.      Mason Aquino DO  7/1/2024  09:18 EDT

## 2024-07-05 ENCOUNTER — HOSPITAL ENCOUNTER (OUTPATIENT)
Dept: ULTRASOUND IMAGING | Facility: HOSPITAL | Age: 34
Discharge: HOME | End: 2024-07-05
Payer: COMMERCIAL

## 2024-07-05 ENCOUNTER — OFFICE VISIT (OUTPATIENT)
Dept: OBSTETRICS AND GYNECOLOGY | Facility: CLINIC | Age: 34
End: 2024-07-05
Payer: COMMERCIAL

## 2024-07-05 VITALS
OXYGEN SATURATION: 99 % | WEIGHT: 177 LBS | SYSTOLIC BLOOD PRESSURE: 133 MMHG | HEART RATE: 73 BPM | DIASTOLIC BLOOD PRESSURE: 72 MMHG | TEMPERATURE: 98.6 F | BODY MASS INDEX: 31.36 KG/M2 | HEIGHT: 63 IN

## 2024-07-05 DIAGNOSIS — O09.299 HX OF PREECLAMPSIA, PRIOR PREGNANCY, CURRENTLY PREGNANT: ICD-10-CM

## 2024-07-05 DIAGNOSIS — O36.5990 FETAL GROWTH RESTRICTION ANTEPARTUM: Primary | ICD-10-CM

## 2024-07-05 DIAGNOSIS — Z64.1 GRAND MULTIPARA: ICD-10-CM

## 2024-07-05 PROCEDURE — 76820 UMBILICAL ARTERY ECHO: CPT

## 2024-07-05 PROCEDURE — 76819 FETAL BIOPHYS PROFIL W/O NST: CPT

## 2024-07-05 NOTE — LETTER
2024       No Recipients    Patient: Raina Grigsby   YOB: 1990   Date of Visit: 2024       Dear Mason Aquino,     Raina Grigsby was in my office today. Below is a copy of my note.    If you have questions, please do not hesitate to call me. I look forward to following Raina along with you.         Sincerely,        Radha Morse MD      MATERNAL FETAL MEDICINE Consult Note    Dear Dr Mason Aquino DO:    Thank you for your kind referral of Raina Grigsby.  As you know, she is a 33 y.o.   at  36 5/7 weeks gestation (Estimated Date of Delivery: 24). This is a consult.      Her antepartum course is complicated by:  FGR, normal dopplers  Grand multip  Hx of preE and abruption with previous pregnancy    Aneuploidy Screening: low risk    HPI: Today, she denies headache, blurry vision, RUQ pain. No vaginal bleeding, no contractions.     Review of History:  Past Medical History:   Diagnosis Date   • Gestational hypertension 3/30/2023   • Hidradenitis      Past Surgical History:   Procedure Laterality Date   •  SECTION N/A 3/30/2023    Procedure:  SECTION PRIMARY;  Surgeon: Ester Hudson MD;  Location: [facility] LABOR DELIVERY;  Service: Obstetrics/Gynecology;  Laterality: N/A;   • GALLBLADDER SURGERY     • TONSILLECTOMY     • WISDOM TOOTH EXTRACTION       Social History     Socioeconomic History   • Marital status: Single   Tobacco Use   • Smoking status: Former     Current packs/day: 0.50     Types: Cigarettes   • Smokeless tobacco: Never   Vaping Use   • Vaping status: Never Used   Substance and Sexual Activity   • Alcohol use: Never   • Drug use: Not Currently     Types: Marijuana   • Sexual activity: Yes     Partners: Male     Family History   Problem Relation Age of Onset   • Alcohol abuse Mother    • Hypertension Father    • COPD Father    • Diabetes Father         Recently diagnosed   • Breast cancer Maternal Grandmother   "  • Diabetes Paternal Grandmother    • Prostate cancer Paternal Grandfather       Allergies   Allergen Reactions   • Egg White (Egg Protein) Anaphylaxis   • Ibuprofen Hives   • Peanut (Diagnostic) Anaphylaxis   • Lactose Intolerance [Tilactase] Diarrhea     Pt says she can eat cheese      Current Outpatient Medications on File Prior to Visit   Medication Sig Dispense Refill   • acetaminophen (TYLENOL) 500 MG tablet Take 1 tablet by mouth Every 6 (Six) Hours As Needed for Mild Pain.     • aspirin 81 MG chewable tablet Chew 1 tablet Daily. 30 tablet 11   • ferrous gluconate (FERGON) 324 MG tablet Take 1 tablet by mouth Daily With Breakfast. 100 tablet 2   • ondansetron ODT (ZOFRAN-ODT) 4 MG disintegrating tablet Place 1 tablet on the tongue Every 8 (Eight) Hours As Needed for Nausea or Vomiting. 30 tablet 2   • Prenatal Vit-Fe Fumarate-FA (prenatal vitamin 27-0.8) 27-0.8 MG tablet tablet Take  by mouth Daily.       No current facility-administered medications on file prior to visit.      Past obstetric, gynecological, medical, surgical, family and social history reviewed.  Relevant lab work and imaging reviewed.    Review of systems  Constitutional:  denies fever, chills, malaise.   ENT/Mouth:  denies sore throat, tinnitus  Eyes: denies vision changes/pain  CV:  denies chest pain  Respiratory:  denies cough/SOB  GI:  denies N/V, diarrhea, abdominal pain.    :   denies dysuria  Skin:  denies lesions or pruritus   Neuro:  denies weakness, focal neurologic symptoms    Vitals:    07/05/24 0930   BP: 133/72   BP Location: Right arm   Patient Position: Sitting   Pulse: 73   Temp: 98.6 °F (37 °C)   TempSrc: Temporal   SpO2: 99%   Weight: 80.3 kg (177 lb)   Height: 160 cm (63\")       PHYSICAL EXAM   GENERAL: Not in acute distress, AAOx3, pleasant  CARDIO: regular rate and rhythm  PULM: symmetric chest rise, speaking in complete sentences without difficulty  NEURO: awake, alert and oriented to person, place, and " time  ABDOMINAL: No fundal tenderness, no rebound or guarding, gravid  EXTREMITIES: no bilateral lower extremity edema/tenderness  SKIN: Warm, well-perfused    ULTRASOUND   Please view full ultrasound note on Imaging tab in ViewPoint.  Cephalic presentation.  Posterior placenta.  VINCENT 21 cm, which is normal.   BPP 8/8  UA dopplers normal without elevated, absent, or reversed end diastolic flow.      ASSESSMENT/COUNSELIN y.o.   at  36 5/7 weeks gestation (Estimated Date of Delivery: 24).    -Pregnancy  [ X ] stable  [   ] improving [  ] worsening    Diagnoses and all orders for this visit:    1. Fetal growth restriction antepartum (Primary)    2. Grand multipara    3. Hx of preeclampsia, prior pregnancy, currently pregnant        FGR, NORMAL DOPPLERS  Hx of FGR in all prior pregnancies  Stopped smoking 1 month ago--congratulated.  Previously counseled.   I think the most likely cause of this is placental insufficiency vs constitutional.  Timing of delivery is guided by the results  testing of fetal well being (ultrasound BPP, FHR tracing findings) in addition to Doppler findings (umbilical artery, MCA, and ductus venosus).  Today we have normal umbilical artery dopplers with an 8/8 BPP which is reassuring.  We discussed umbilical artery doppler studies as 4 possible levels: normal, elevated, absent, and reversed.  We discussed that absent and reversed would require admission,  corticosteroids, and possibly delivery depending on gestational age and other testing.         I would recommend limitation of activities, daily fetal movement assessment, and twice weekly ANFS with BPP and Doppler studies weekly.  Placental insufficiency causing fetal growth restriction can be a heralding sign of pre-eclampsia.  She has no evidence of this today, but should be observed for possible development as pregnancy progresses.  We discussed in detail symptoms of pre-eclampsia. She has had this before with  some of her prior pregnancies.    She is doing well.  Dopplers and BPP normal. Pt has good movement.      Summary of Plan  -Repeat Growth in 4 weeks  -Thursday BPP/dopplers MFM, Monday BPP or NST at primary OB recommended.  -Careful attention to fetal movement and symptoms of pre-eclampsia  -Continue to follow with OB for prenatal care  -Delivery 38 weeks unless indicated sooner.     Follow-up: Follow up weekly    Thank you for the consult and opportunity to care for this patient.  Please feel free to reach out with any questions or concerns.      I spent 18 minutes caring for this patient on this date of service. This time includes time spent by me in the following activities: preparing for the visit, reviewing tests, obtaining and/or reviewing a separately obtained history, performing a medically appropriate examination and/or evaluation, counseling and educating the patient/family/caregiver and independently interpreting results and communicating that information with the patient/family/caregiver with greater than 50% spent in counseling and coordination of care.     4 minutes reading US.     Radha Morse MD FACOG  Maternal Fetal Medicine-[facility]  Office: [phone]  [email or web address]

## 2024-07-05 NOTE — PROGRESS NOTES
MATERNAL FETAL MEDICINE Consult Note    Dear Dr Mason Aquino, DO:    Thank you for your kind referral of Raina Grigsby.  As you know, she is a 33 y.o.   at  36 5/7 weeks gestation (Estimated Date of Delivery: 24). This is a consult.      Her antepartum course is complicated by:  FGR, normal dopplers  Grand multip  Hx of preE and abruption with previous pregnancy    Aneuploidy Screening: low risk    HPI: Today, she denies headache, blurry vision, RUQ pain. No vaginal bleeding, no contractions.     Review of History:  Past Medical History:   Diagnosis Date    Gestational hypertension 3/30/2023    Hidradenitis      Past Surgical History:   Procedure Laterality Date     SECTION N/A 3/30/2023    Procedure:  SECTION PRIMARY;  Surgeon: Ester Hudson MD;  Location: [facility] LABOR DELIVERY;  Service: Obstetrics/Gynecology;  Laterality: N/A;    GALLBLADDER SURGERY      TONSILLECTOMY      WISDOM TOOTH EXTRACTION       Social History     Socioeconomic History    Marital status: Single   Tobacco Use    Smoking status: Former     Current packs/day: 0.50     Types: Cigarettes    Smokeless tobacco: Never   Vaping Use    Vaping status: Never Used   Substance and Sexual Activity    Alcohol use: Never    Drug use: Not Currently     Types: Marijuana    Sexual activity: Yes     Partners: Male     Family History   Problem Relation Age of Onset    Alcohol abuse Mother     Hypertension Father     COPD Father     Diabetes Father         Recently diagnosed    Breast cancer Maternal Grandmother     Diabetes Paternal Grandmother     Prostate cancer Paternal Grandfather       Allergies   Allergen Reactions    Egg White (Egg Protein) Anaphylaxis    Ibuprofen Hives    Peanut (Diagnostic) Anaphylaxis    Lactose Intolerance [Tilactase] Diarrhea     Pt says she can eat cheese      Current Outpatient Medications on File Prior to Visit   Medication Sig Dispense Refill    acetaminophen (TYLENOL) 500  "MG tablet Take 1 tablet by mouth Every 6 (Six) Hours As Needed for Mild Pain.      aspirin 81 MG chewable tablet Chew 1 tablet Daily. 30 tablet 11    ferrous gluconate (FERGON) 324 MG tablet Take 1 tablet by mouth Daily With Breakfast. 100 tablet 2    ondansetron ODT (ZOFRAN-ODT) 4 MG disintegrating tablet Place 1 tablet on the tongue Every 8 (Eight) Hours As Needed for Nausea or Vomiting. 30 tablet 2    Prenatal Vit-Fe Fumarate-FA (prenatal vitamin 27-0.8) 27-0.8 MG tablet tablet Take  by mouth Daily.       No current facility-administered medications on file prior to visit.      Past obstetric, gynecological, medical, surgical, family and social history reviewed.  Relevant lab work and imaging reviewed.    Review of systems  Constitutional:  denies fever, chills, malaise.   ENT/Mouth:  denies sore throat, tinnitus  Eyes: denies vision changes/pain  CV:  denies chest pain  Respiratory:  denies cough/SOB  GI:  denies N/V, diarrhea, abdominal pain.    :   denies dysuria  Skin:  denies lesions or pruritus   Neuro:  denies weakness, focal neurologic symptoms    Vitals:    07/05/24 0930   BP: 133/72   BP Location: Right arm   Patient Position: Sitting   Pulse: 73   Temp: 98.6 °F (37 °C)   TempSrc: Temporal   SpO2: 99%   Weight: 80.3 kg (177 lb)   Height: 160 cm (63\")       PHYSICAL EXAM   GENERAL: Not in acute distress, AAOx3, pleasant  CARDIO: regular rate and rhythm  PULM: symmetric chest rise, speaking in complete sentences without difficulty  NEURO: awake, alert and oriented to person, place, and time  ABDOMINAL: No fundal tenderness, no rebound or guarding, gravid  EXTREMITIES: no bilateral lower extremity edema/tenderness  SKIN: Warm, well-perfused    ULTRASOUND   Please view full ultrasound note on Imaging tab in ViewPoint.  Cephalic presentation.  Posterior placenta.  VINCENT 21 cm, which is normal.   BPP 8/8  UA dopplers normal without elevated, absent, or reversed end diastolic flow.  "     ASSESSMENT/COUNSELIN y.o.   at  36 5/7 weeks gestation (Estimated Date of Delivery: 24).    -Pregnancy  [ X ] stable  [   ] improving [  ] worsening    Diagnoses and all orders for this visit:    1. Fetal growth restriction antepartum (Primary)    2. Grand multipara    3. Hx of preeclampsia, prior pregnancy, currently pregnant        FGR, NORMAL DOPPLERS  Hx of FGR in all prior pregnancies  Stopped smoking 1 month ago--congratulated.  Previously counseled.   I think the most likely cause of this is placental insufficiency vs constitutional.  Timing of delivery is guided by the results  testing of fetal well being (ultrasound BPP, FHR tracing findings) in addition to Doppler findings (umbilical artery, MCA, and ductus venosus).  Today we have normal umbilical artery dopplers with an 8/8 BPP which is reassuring.  We discussed umbilical artery doppler studies as 4 possible levels: normal, elevated, absent, and reversed.  We discussed that absent and reversed would require admission,  corticosteroids, and possibly delivery depending on gestational age and other testing.         I would recommend limitation of activities, daily fetal movement assessment, and twice weekly ANFS with BPP and Doppler studies weekly.  Placental insufficiency causing fetal growth restriction can be a heralding sign of pre-eclampsia.  She has no evidence of this today, but should be observed for possible development as pregnancy progresses.  We discussed in detail symptoms of pre-eclampsia. She has had this before with some of her prior pregnancies.    She is doing well.  Dopplers and BPP normal. Pt has good movement.      Summary of Plan  -Repeat Growth in 4 weeks  -Thursday BPP/dopplers MFM, Monday BPP or NST at primary OB recommended.  -Careful attention to fetal movement and symptoms of pre-eclampsia  -Continue to follow with OB for prenatal care  -Delivery 38 weeks unless indicated sooner.     Follow-up:  Follow up weekly    Thank you for the consult and opportunity to care for this patient.  Please feel free to reach out with any questions or concerns.      I spent 18 minutes caring for this patient on this date of service. This time includes time spent by me in the following activities: preparing for the visit, reviewing tests, obtaining and/or reviewing a separately obtained history, performing a medically appropriate examination and/or evaluation, counseling and educating the patient/family/caregiver and independently interpreting results and communicating that information with the patient/family/caregiver with greater than 50% spent in counseling and coordination of care.     4 minutes reading US.     Rdaha Morse MD FACOG  Maternal Fetal Medicine-[facility]  Office: [phone]  [email or web address]

## 2024-07-08 ENCOUNTER — ROUTINE PRENATAL (OUTPATIENT)
Dept: OBSTETRICS AND GYNECOLOGY | Facility: CLINIC | Age: 34
End: 2024-07-08
Payer: COMMERCIAL

## 2024-07-08 VITALS — SYSTOLIC BLOOD PRESSURE: 130 MMHG | WEIGHT: 176 LBS | BODY MASS INDEX: 31.18 KG/M2 | DIASTOLIC BLOOD PRESSURE: 80 MMHG

## 2024-07-08 DIAGNOSIS — O23.40 URINARY TRACT INFECTION IN MOTHER DURING PREGNANCY, ANTEPARTUM: ICD-10-CM

## 2024-07-08 DIAGNOSIS — O09.299 HX OF PREECLAMPSIA, PRIOR PREGNANCY, CURRENTLY PREGNANT: ICD-10-CM

## 2024-07-08 DIAGNOSIS — Z98.891 S/P EMERGENCY CESAREAN SECTION: ICD-10-CM

## 2024-07-08 DIAGNOSIS — Z64.1 GRAND MULTIPARA: ICD-10-CM

## 2024-07-08 DIAGNOSIS — Z36.9 ENCOUNTER FOR ANTENATAL SCREENING, UNSPECIFIED: Primary | ICD-10-CM

## 2024-07-08 DIAGNOSIS — O36.5930 MATERNAL CARE FOR POOR FETAL GROWTH IN THIRD TRIMESTER, SINGLE OR UNSPECIFIED FETUS: ICD-10-CM

## 2024-07-08 DIAGNOSIS — O09.899 SHORT INTERVAL BETWEEN PREGNANCIES AFFECTING PREGNANCY, ANTEPARTUM: ICD-10-CM

## 2024-07-08 DIAGNOSIS — Z87.59 HISTORY OF POLYHYDRAMNIOS: ICD-10-CM

## 2024-07-08 DIAGNOSIS — Z87.59 H/O RAPID LABOR: ICD-10-CM

## 2024-07-08 DIAGNOSIS — Z30.2 REQUEST FOR STERILIZATION: ICD-10-CM

## 2024-07-08 PROCEDURE — 99214 OFFICE O/P EST MOD 30 MIN: CPT | Performed by: STUDENT IN AN ORGANIZED HEALTH CARE EDUCATION/TRAINING PROGRAM

## 2024-07-08 NOTE — PROGRESS NOTES
OB follow up > 20 weeks    CC:  Here for prenatal follow up    Raina Grigsby is a 33 y.o.  37+ being seen today for her obstetrical visit.  She reports good fetal movement. MFM appt Thursday. Denies s/s of Labor or Pre-e- SF     Review of Systems  Genitourinary: Neg for cramping, vaginal bleeding, SROM, or dysuria.     /80   Wt 79.8 kg (176 lb)   LMP  (LMP Unknown)   BMI 31.18 kg/m²     Vitals: VSS; AF    General Appearance:  Awake. Alert. Well developed. Well nourished. In no acute distress.    Visual Inspection: ° Abdomen was normal on visual inspection.  Palpation: ° Abdomen was soft. ° Abdominal non-tender.    Uterus: ° Fundal height was normal for gestational age. ° Not tender.  Uterine Adnexae: ° Normal without masses or tenderness.  Neurological:  ° Oriented to time, place, and person.  Skin:  ° General appearance was normal. No bruising or ecchymosis.  Obstetrical: +FM and FCA     Presentation: VTX  Placenta: Posterior  VINCENT: 10cm   FCA: 140's      BPP 8/8        Ultrasound images and report reviewed personally by me.    Full interpretation of ultrasound can be found in the patient's note on the same date of service.     Mason Aquino, DO     Assessment    1) Pregnancy at 37+  BPP 8/8       2) T21 neg. CF/SMA/FX neg.  AFP neg    3) Smoker- Quit! Approx 1 month ago.     4) UDS + for THC- Repeat UDS remains + for THC, enc no use in pregnancy.     5) UTI x 1- unable to finish all abx; repeat Urine cx neg 24     6) Grand multipara- The patient understands that she is a grand multipara because she has given birth to 5 or more infants which increases her risk during pregnancy for factors including but not limited to abnormal fetal presentation, precipitate delivery, uterine atony, placenta previa, uterine rupture, postpartum hemorrhage, etc.     7)  Hx of pre-E and GHTN- Is not taking baby ASA- refuses.  CMP WNL; Pr/Cr ratio= 204 on 24   Trace protein and ketones noted on  urine dip today; occasional headaches but denies visual changes. Rev warn s/s.      8)  Hx of C/S- emergent due to placental abruption; desires repeat. Plan  @ 38 weeks per MFM.      9)  Desires sterilization- Tubal consent signed and in the chart. Desires bilateral salpingectomy.      10) N/V- improving; has Zofran if needed-     11) H/O PPROM @ 36 weeks with 5th child- Did not do [place] or vaginal progesterone with consecutive pregnancies      12) Short interval between pregnancy- C/S 3/2023; CL 4.0cm @ 16wga    13) tachycardia- Continues to occur occasion. May be having palpitations as well. Mainly occurs with activity. Her apple watch indicates a normal heart rate of 70s with increase to 120s.  Thyroid panel WNL; EKG re-ordered but not completed. Hgb 11.7g/dL.     14) S/P tDap     15) Anemia- Taking iron. Hgb 11.7g/dL.     16) Pelvic pressure- Enc pregnancy support belt. CL 3.37cm @ 30 weeks      17) Poor fetal growth- Overall growth 16%,AC 4% on 6/13/24 compared to growth 21%, AC 5% on 5/30/24. S/P MFM consult.  Recommend delivery 38wga   Scheduled RLTCS and BS today for 56Ptt13   24Jun growth EFW  23%  AC 9%       Summary of Plan 6/13/24  -Repeat Growth in 4 weeks  -Thursday BPP/dopplers MFM, Monday BPP or NST at primary OB recommended.  -Careful attention to fetal movement and symptoms of pre-eclampsia  -Continue to follow with OB for prenatal care  -Delivery 38 weeks unless indicated sooner.      Plan    Continue prenatal vitamins   Reviewed this stage of pregnancy  Problem list updated   MFM appt Thursday   Scheduled RLTCS and BS  for 96Pmc89     Parts of this document have been copied or forwarded from her previous visits and have been reviewed, updated and edited as indicated.      Mason Aquino,   7/8/2024  09:03 EDT

## 2024-07-11 ENCOUNTER — HOSPITAL ENCOUNTER (OUTPATIENT)
Dept: ULTRASOUND IMAGING | Facility: HOSPITAL | Age: 34
Discharge: HOME | End: 2024-07-11
Admitting: OBSTETRICS & GYNECOLOGY
Payer: COMMERCIAL

## 2024-07-11 ENCOUNTER — OFFICE VISIT (OUTPATIENT)
Dept: OBSTETRICS AND GYNECOLOGY | Facility: CLINIC | Age: 34
End: 2024-07-11
Payer: COMMERCIAL

## 2024-07-11 VITALS
WEIGHT: 175 LBS | SYSTOLIC BLOOD PRESSURE: 121 MMHG | OXYGEN SATURATION: 98 % | HEIGHT: 63 IN | TEMPERATURE: 98.7 F | DIASTOLIC BLOOD PRESSURE: 68 MMHG | HEART RATE: 78 BPM | BODY MASS INDEX: 31.01 KG/M2

## 2024-07-11 DIAGNOSIS — Z64.1 GRAND MULTIPARA: Primary | ICD-10-CM

## 2024-07-11 DIAGNOSIS — O36.5990 FETAL GROWTH RESTRICTION ANTEPARTUM: ICD-10-CM

## 2024-07-11 DIAGNOSIS — O36.5991 POOR FETAL GROWTH AFFECTING MANAGEMENT OF MOTHER, ANTEPARTUM, FETUS 1 OF MULTIPLE GESTATION: ICD-10-CM

## 2024-07-11 PROCEDURE — 76819 FETAL BIOPHYS PROFIL W/O NST: CPT

## 2024-07-11 PROCEDURE — 76816 OB US FOLLOW-UP PER FETUS: CPT

## 2024-07-11 PROCEDURE — 76820 UMBILICAL ARTERY ECHO: CPT

## 2024-07-11 NOTE — PROGRESS NOTES
MATERNAL FETAL MEDICINE Consult Note    Dear Dr Mason Aquino, DO:    Thank you for your kind referral of Raina Grigsby.  As you know, she is a 33 y.o.   at  37 4/7 weeks gestation (Estimated Date of Delivery: 24). This is a consult.      Her antepartum course is complicated by:  FGR, normal dopplers  Grand multip  Hx of preE and abruption with previous pregnancy    Aneuploidy Screening: low risk    HPI: Today, she denies headache, blurry vision, RUQ pain. No vaginal bleeding, no contractions.     Review of History:  Past Medical History:   Diagnosis Date    Gestational hypertension 3/30/2023    Hidradenitis      Past Surgical History:   Procedure Laterality Date     SECTION N/A 3/30/2023    Procedure:  SECTION PRIMARY;  Surgeon: Ester Hudson MD;  Location: [facility] LABOR DELIVERY;  Service: Obstetrics/Gynecology;  Laterality: N/A;    GALLBLADDER SURGERY      TONSILLECTOMY      WISDOM TOOTH EXTRACTION       Social History     Socioeconomic History    Marital status: Single   Tobacco Use    Smoking status: Former     Current packs/day: 0.50     Types: Cigarettes    Smokeless tobacco: Never   Vaping Use    Vaping status: Never Used   Substance and Sexual Activity    Alcohol use: Never    Drug use: Not Currently     Types: Marijuana    Sexual activity: Yes     Partners: Male     Family History   Problem Relation Age of Onset    Alcohol abuse Mother     Hypertension Father     COPD Father     Diabetes Father         Recently diagnosed    Breast cancer Maternal Grandmother     Diabetes Paternal Grandmother     Prostate cancer Paternal Grandfather       Allergies   Allergen Reactions    Egg White (Egg Protein) Anaphylaxis    Ibuprofen Hives    Peanut (Diagnostic) Anaphylaxis    Lactose Intolerance [Tilactase] Diarrhea     Pt says she can eat cheese      Current Outpatient Medications on File Prior to Visit   Medication Sig Dispense Refill    acetaminophen (TYLENOL) 500  "MG tablet Take 1 tablet by mouth Every 6 (Six) Hours As Needed for Mild Pain.      aspirin 81 MG chewable tablet Chew 1 tablet Daily. 30 tablet 11    ferrous gluconate (FERGON) 324 MG tablet Take 1 tablet by mouth Daily With Breakfast. 100 tablet 2    ondansetron ODT (ZOFRAN-ODT) 4 MG disintegrating tablet Place 1 tablet on the tongue Every 8 (Eight) Hours As Needed for Nausea or Vomiting. 30 tablet 2    Prenatal Vit-Fe Fumarate-FA (prenatal vitamin 27-0.8) 27-0.8 MG tablet tablet Take  by mouth Daily.       No current facility-administered medications on file prior to visit.      Past obstetric, gynecological, medical, surgical, family and social history reviewed.  Relevant lab work and imaging reviewed.    Review of systems  Constitutional:  denies fever, chills, malaise.   ENT/Mouth:  denies sore throat, tinnitus  Eyes: denies vision changes/pain  CV:  denies chest pain  Respiratory:  denies cough/SOB  GI:  denies N/V, diarrhea, abdominal pain.    :   denies dysuria  Skin:  denies lesions or pruritus   Neuro:  denies weakness, focal neurologic symptoms    Vitals:    07/11/24 1021   BP: 121/68   BP Location: Right arm   Patient Position: Sitting   Pulse: 78   Temp: 98.7 °F (37.1 °C)   TempSrc: Temporal   SpO2: 98%   Weight: 79.4 kg (175 lb)   Height: 160 cm (63\")       PHYSICAL EXAM   GENERAL: Not in acute distress, AAOx3, pleasant  CARDIO: regular rate and rhythm  PULM: symmetric chest rise, speaking in complete sentences without difficulty  NEURO: awake, alert and oriented to person, place, and time  ABDOMINAL: No fundal tenderness, no rebound or guarding, gravid  EXTREMITIES: no bilateral lower extremity edema/tenderness  SKIN: Warm, well-perfused    ULTRASOUND   Please view full ultrasound note on Imaging tab in ViewPoint.  Cephalic presentation  Posterior placenta  VINCENT 13.9 cm, which is normal  EFW 2472 g (7% AC 3%)  UA dopplers normal without elevated, absent, or reversed end diastolic flow.  "     ASSESSMENT/COUNSELIN y.o.   at  37 4/7 weeks gestation (Estimated Date of Delivery: 24).    -Pregnancy  [ X ] stable  [   ] improving [  ] worsening    Diagnoses and all orders for this visit:    1. Grand multipara (Primary)    2. Fetal growth restriction antepartum      FGR, NORMAL DOPPLERS  Hx of FGR in all prior pregnancies  Stopped smoking 1 month ago--congratulated.  Previously counseled.   I think the most likely cause of this is placental insufficiency vs constitutional.  Timing of delivery is guided by the results  testing of fetal well being (ultrasound BPP, FHR tracing findings) in addition to Doppler findings (umbilical artery, MCA, and ductus venosus).  Today we have normal umbilical artery dopplers with an  BPP which is reassuring.  We discussed umbilical artery doppler studies as 4 possible levels: normal, elevated, absent, and reversed.  We discussed that absent and reversed would require admission,  corticosteroids, and possibly delivery depending on gestational age and other testing.         I would recommend limitation of activities, daily fetal movement assessment, and twice weekly ANFS with BPP and Doppler studies weekly.  Placental insufficiency causing fetal growth restriction can be a heralding sign of pre-eclampsia.  She has no evidence of this today, but should be observed for possible development as pregnancy progresses.  We discussed in detail symptoms of pre-eclampsia. She has had this before with some of her prior pregnancies.    Update 24  She is doing well.  Dopplers and BPP normal. Pt has good movement.    Scheduled for  7/15/24 - congratulations and best wishes!    Summary of Plan  -Repeat Growth in 4 weeks  -Thursday BPP/dopplers M, Monday BPP or NST at primary OB recommended.  -Careful attention to fetal movement and symptoms of pre-eclampsia  -Continue to follow with OB for prenatal care  -Delivery 38 weeks unless indicated  sooner.     Follow-up: No follow up with MFM scheduled, but I am happy to see for follow up at request of primary obstetrician      Thank you for the consult and opportunity to care for this patient.  Please feel free to reach out with any questions or concerns.      I spent 18 minutes caring for this patient on this date of service. This time includes time spent by me in the following activities: preparing for the visit, reviewing tests, obtaining and/or reviewing a separately obtained history, performing a medically appropriate examination and/or evaluation, counseling and educating the patient/family/caregiver and independently interpreting results and communicating that information with the patient/family/caregiver with greater than 50% spent in counseling and coordination of care.     TAIWO Olmedo  Maternal Fetal Medicine-[facility]  Office: [phone]  [email or web address]

## 2024-07-11 NOTE — PROGRESS NOTES
Pt reports that she is doing well and denies vaginal bleeding, cramping, contractions or LOF at this time. Patient reports occasional vaginal pressure. Reports active fetal movement. Reviewed when to call OB office or present to L&D for evaluation with symptoms such as decreased fetal movement, vaginal bleeding, LOF or ctxs. Pt verbalized understanding. Denies HA, visual changes or epigastric pain. Denies any additional complaints at time of appointment. Next OB appointment is not scheduled, patient  is having  on 07/15/2024.    Vitals:    24 1021   BP: 121/68   Pulse: 78   Temp: 98.7 °F (37.1 °C)   SpO2: 98%

## 2024-07-11 NOTE — LETTER
2024       No Recipients    Patient: Raina Grigsby   YOB: 1990   Date of Visit: 2024       Dear Mason Aquino DO    Raina Grigsby was in my office today. Below is a copy of my note.    If you have questions, please do not hesitate to call me. I look forward to following Raina along with you.         Sincerely,        TAIWO Mantilla        CC:   No Recipients                            MATERNAL FETAL MEDICINE Consult Note    Dear Dr Mason Aquino DO:    Thank you for your kind referral of Raina Grigsby.  As you know, she is a 33 y.o.   at  37 4/7 weeks gestation (Estimated Date of Delivery: 24). This is a consult.      Her antepartum course is complicated by:  FGR, normal dopplers  Grand multip  Hx of preE and abruption with previous pregnancy    Aneuploidy Screening: low risk    HPI: Today, she denies headache, blurry vision, RUQ pain. No vaginal bleeding, no contractions.     Review of History:  Past Medical History:   Diagnosis Date   • Gestational hypertension 3/30/2023   • Hidradenitis      Past Surgical History:   Procedure Laterality Date   •  SECTION N/A 3/30/2023    Procedure:  SECTION PRIMARY;  Surgeon: Ester Hudson MD;  Location: [facility] LABOR DELIVERY;  Service: Obstetrics/Gynecology;  Laterality: N/A;   • GALLBLADDER SURGERY     • TONSILLECTOMY     • WISDOM TOOTH EXTRACTION       Social History     Socioeconomic History   • Marital status: Single   Tobacco Use   • Smoking status: Former     Current packs/day: 0.50     Types: Cigarettes   • Smokeless tobacco: Never   Vaping Use   • Vaping status: Never Used   Substance and Sexual Activity   • Alcohol use: Never   • Drug use: Not Currently     Types: Marijuana   • Sexual activity: Yes     Partners: Male     Family History   Problem Relation Age of Onset   • Alcohol abuse Mother    • Hypertension Father    • COPD Father    • Diabetes Father          "Recently diagnosed   • Breast cancer Maternal Grandmother    • Diabetes Paternal Grandmother    • Prostate cancer Paternal Grandfather       Allergies   Allergen Reactions   • Egg White (Egg Protein) Anaphylaxis   • Ibuprofen Hives   • Peanut (Diagnostic) Anaphylaxis   • Lactose Intolerance [Tilactase] Diarrhea     Pt says she can eat cheese      Current Outpatient Medications on File Prior to Visit   Medication Sig Dispense Refill   • acetaminophen (TYLENOL) 500 MG tablet Take 1 tablet by mouth Every 6 (Six) Hours As Needed for Mild Pain.     • aspirin 81 MG chewable tablet Chew 1 tablet Daily. 30 tablet 11   • ferrous gluconate (FERGON) 324 MG tablet Take 1 tablet by mouth Daily With Breakfast. 100 tablet 2   • ondansetron ODT (ZOFRAN-ODT) 4 MG disintegrating tablet Place 1 tablet on the tongue Every 8 (Eight) Hours As Needed for Nausea or Vomiting. 30 tablet 2   • Prenatal Vit-Fe Fumarate-FA (prenatal vitamin 27-0.8) 27-0.8 MG tablet tablet Take  by mouth Daily.       No current facility-administered medications on file prior to visit.      Past obstetric, gynecological, medical, surgical, family and social history reviewed.  Relevant lab work and imaging reviewed.    Review of systems  Constitutional:  denies fever, chills, malaise.   ENT/Mouth:  denies sore throat, tinnitus  Eyes: denies vision changes/pain  CV:  denies chest pain  Respiratory:  denies cough/SOB  GI:  denies N/V, diarrhea, abdominal pain.    :   denies dysuria  Skin:  denies lesions or pruritus   Neuro:  denies weakness, focal neurologic symptoms    Vitals:    07/11/24 1021   BP: 121/68   BP Location: Right arm   Patient Position: Sitting   Pulse: 78   Temp: 98.7 °F (37.1 °C)   TempSrc: Temporal   SpO2: 98%   Weight: 79.4 kg (175 lb)   Height: 160 cm (63\")       PHYSICAL EXAM   GENERAL: Not in acute distress, AAOx3, pleasant  CARDIO: regular rate and rhythm  PULM: symmetric chest rise, speaking in complete sentences without difficulty  NEURO: " awake, alert and oriented to person, place, and time  ABDOMINAL: No fundal tenderness, no rebound or guarding, gravid  EXTREMITIES: no bilateral lower extremity edema/tenderness  SKIN: Warm, well-perfused    ULTRASOUND   Please view full ultrasound note on Imaging tab in [employer].  Cephalic presentation  Posterior placenta  VINCENT 13.9 cm, which is normal  EFW 2472 g (7% AC 3%)  UA dopplers normal without elevated, absent, or reversed end diastolic flow.      ASSESSMENT/COUNSELIN y.o.   at  37 4/7 weeks gestation (Estimated Date of Delivery: 24).    -Pregnancy  [ X ] stable  [   ] improving [  ] worsening    Diagnoses and all orders for this visit:    1. Grand multipara (Primary)    2. Fetal growth restriction antepartum      FGR, NORMAL DOPPLERS  Hx of FGR in all prior pregnancies  Stopped smoking 1 month ago--congratulated.  Previously counseled.   I think the most likely cause of this is placental insufficiency vs constitutional.  Timing of delivery is guided by the results  testing of fetal well being (ultrasound BPP, FHR tracing findings) in addition to Doppler findings (umbilical artery, MCA, and ductus venosus).  Today we have normal umbilical artery dopplers with an 8/8 BPP which is reassuring.  We discussed umbilical artery doppler studies as 4 possible levels: normal, elevated, absent, and reversed.  We discussed that absent and reversed would require admission,  corticosteroids, and possibly delivery depending on gestational age and other testing.         I would recommend limitation of activities, daily fetal movement assessment, and twice weekly ANFS with BPP and Doppler studies weekly.  Placental insufficiency causing fetal growth restriction can be a heralding sign of pre-eclampsia.  She has no evidence of this today, but should be observed for possible development as pregnancy progresses.  We discussed in detail symptoms of pre-eclampsia. She has had this before with some  of her prior pregnancies.    Update 24  She is doing well.  Dopplers and BPP normal. Pt has good movement.    Scheduled for  7/15/24 - congratulations and best wishes!    Summary of Plan  -Repeat Growth in 4 weeks  -Thursday BPP/dopplers MFM, Monday BPP or NST at primary OB recommended.  -Careful attention to fetal movement and symptoms of pre-eclampsia  -Continue to follow with OB for prenatal care  -Delivery 38 weeks unless indicated sooner.     Follow-up: No follow up with MFM scheduled, but I am happy to see for follow up at request of primary obstetrician      Thank you for the consult and opportunity to care for this patient.  Please feel free to reach out with any questions or concerns.      I spent 18 minutes caring for this patient on this date of service. This time includes time spent by me in the following activities: preparing for the visit, reviewing tests, obtaining and/or reviewing a separately obtained history, performing a medically appropriate examination and/or evaluation, counseling and educating the patient/family/caregiver and independently interpreting results and communicating that information with the patient/family/caregiver with greater than 50% spent in counseling and coordination of care.     TAIWO Olmedo  Maternal Fetal Medicine-[facility]  Office: [phone]  [email or web address]

## 2024-07-12 ENCOUNTER — TELEPHONE (OUTPATIENT)
Dept: OBSTETRICS AND GYNECOLOGY | Facility: HOSPITAL | Age: 34
End: 2024-07-12

## 2024-07-12 NOTE — TELEPHONE ENCOUNTER
Spoke with Raina today @ [phone] via phone. Very pleasant, voices nervous and excited about Monday! Went over ERAS instructions for Mondays scheduled c/section 7/15/24 @ 1200. Patient is aware she is to be here by 10am Monday and that she is to take x2 tylenol 500 mg tablets orally Sunday evening of 7/14. Also, aware to drink 20 oz of gatorade (not red) two hours before her given arrival time to the hospital. -RUBENS Yuen

## 2024-07-15 ENCOUNTER — TELEPHONE (OUTPATIENT)
Dept: OBSTETRICS AND GYNECOLOGY | Facility: HOSPITAL | Age: 34
End: 2024-07-15

## 2024-07-15 NOTE — TELEPHONE ENCOUNTER
Spoke with simi via phone early this am and made aware of changes in c/section. Patient very pleasant, cooperative and understanding of unit being busy with all labor rooms occupied. she happily agreed to wait until tomorrow. I called simi back today @ 1327 once spoke with OB provider, they will be doing her scheduled repeat c/section tomorrow 7/16/24 @ 0930am. We went over ERAS instructions. she is aware to take Tylenol tonight and to drink 20 oz gatorade two hours before her given arrival time to the hospital which is 7/16/24 @ 0730.-RUBENS Yuen

## 2024-07-16 ENCOUNTER — HOSPITAL ENCOUNTER (INPATIENT)
Facility: HOSPITAL | Age: 34
LOS: 2 days | Discharge: HOME | End: 2024-07-18
Attending: OBSTETRICS & GYNECOLOGY | Admitting: OBSTETRICS & GYNECOLOGY
Payer: COMMERCIAL

## 2024-07-16 ENCOUNTER — ANESTHESIA EVENT (OUTPATIENT)
Dept: OBSTETRICS AND GYNECOLOGY | Facility: HOSPITAL | Age: 34
End: 2024-07-16
Payer: COMMERCIAL

## 2024-07-16 ENCOUNTER — ANESTHESIA (OUTPATIENT)
Dept: OBSTETRICS AND GYNECOLOGY | Facility: HOSPITAL | Age: 34
End: 2024-07-16
Payer: COMMERCIAL

## 2024-07-16 DIAGNOSIS — Z30.2 REQUEST FOR STERILIZATION: ICD-10-CM

## 2024-07-16 DIAGNOSIS — Z98.891 STATUS POST REPEAT LOW TRANSVERSE CESAREAN SECTION: Primary | ICD-10-CM

## 2024-07-16 DIAGNOSIS — Z98.891 S/P EMERGENCY CESAREAN SECTION: ICD-10-CM

## 2024-07-16 LAB
ABO GROUP BLD: NORMAL
AMPHET+METHAMPHET UR QL: NEGATIVE
AMPHETAMINES UR QL: NEGATIVE
ANION GAP SERPL CALCULATED.3IONS-SCNC: 10.5 MMOL/L (ref 5–15)
BARBITURATES UR QL SCN: NEGATIVE
BENZODIAZ UR QL SCN: NEGATIVE
BILIRUB UR QL STRIP: NEGATIVE
BLD GP AB SCN SERPL QL: NEGATIVE
BUN SERPL-MCNC: 4 MG/DL (ref 6–20)
BUN/CREAT SERPL: 11.4 (ref 7–25)
BUPRENORPHINE SERPL-MCNC: NEGATIVE NG/ML
CALCIUM SPEC-SCNC: 8.2 MG/DL (ref 8.6–10.5)
CANNABINOIDS SERPL QL: NEGATIVE
CHLORIDE SERPL-SCNC: 105 MMOL/L (ref 98–107)
CLARITY UR: CLEAR
CO2 SERPL-SCNC: 21.5 MMOL/L (ref 22–29)
COCAINE UR QL: NEGATIVE
COLOR UR: YELLOW
CREAT SERPL-MCNC: 0.35 MG/DL (ref 0.57–1)
DEPRECATED RDW RBC AUTO: 46.6 FL (ref 37–54)
EGFRCR SERPLBLD CKD-EPI 2021: 139.5 ML/MIN/1.73
ERYTHROCYTE [DISTWIDTH] IN BLOOD BY AUTOMATED COUNT: 13 % (ref 12.3–15.4)
GLUCOSE SERPL-MCNC: 72 MG/DL (ref 65–99)
GLUCOSE UR STRIP-MCNC: NEGATIVE MG/DL
HCT VFR BLD AUTO: 29.9 % (ref 34–46.6)
HGB BLD-MCNC: 10.1 G/DL (ref 12–15.9)
HGB UR QL STRIP.AUTO: NEGATIVE
KETONES UR QL STRIP: NEGATIVE
LEUKOCYTE ESTERASE UR QL STRIP.AUTO: NEGATIVE
MCH RBC QN AUTO: 33 PG (ref 26.6–33)
MCHC RBC AUTO-ENTMCNC: 33.8 G/DL (ref 31.5–35.7)
MCV RBC AUTO: 97.7 FL (ref 79–97)
METHADONE UR QL SCN: NEGATIVE
NITRITE UR QL STRIP: NEGATIVE
OPIATES UR QL: NEGATIVE
OXYCODONE UR QL SCN: NEGATIVE
PCP UR QL SCN: NEGATIVE
PH UR STRIP.AUTO: 7.5 [PH] (ref 4.5–8)
PLATELET # BLD AUTO: 295 10*3/MM3 (ref 140–450)
PMV BLD AUTO: 9.7 FL (ref 6–12)
POTASSIUM SERPL-SCNC: 3 MMOL/L (ref 3.5–5.2)
PROT UR QL STRIP: NEGATIVE
RBC # BLD AUTO: 3.06 10*6/MM3 (ref 3.77–5.28)
RH BLD: POSITIVE
SODIUM SERPL-SCNC: 137 MMOL/L (ref 136–145)
SP GR UR STRIP: 1.01 (ref 1–1.03)
T&S EXPIRATION DATE: NORMAL
TREPONEMA PALLIDUM IGG+IGM AB [PRESENCE] IN SERUM OR PLASMA BY IMMUNOASSAY: NORMAL
TRICYCLICS UR QL SCN: NEGATIVE
UROBILINOGEN UR QL STRIP: NORMAL
WBC NRBC COR # BLD AUTO: 11.33 10*3/MM3 (ref 3.4–10.8)

## 2024-07-16 PROCEDURE — 25010000002 KETOROLAC TROMETHAMINE PER 15 MG: Performed by: STUDENT IN AN ORGANIZED HEALTH CARE EDUCATION/TRAINING PROGRAM

## 2024-07-16 PROCEDURE — 94799 UNLISTED PULMONARY SVC/PX: CPT

## 2024-07-16 PROCEDURE — 86900 BLOOD TYPING SEROLOGIC ABO: CPT | Performed by: STUDENT IN AN ORGANIZED HEALTH CARE EDUCATION/TRAINING PROGRAM

## 2024-07-16 PROCEDURE — 80048 BASIC METABOLIC PNL TOTAL CA: CPT | Performed by: STUDENT IN AN ORGANIZED HEALTH CARE EDUCATION/TRAINING PROGRAM

## 2024-07-16 PROCEDURE — 25010000002 MORPHINE PER 10 MG

## 2024-07-16 PROCEDURE — 88307 TISSUE EXAM BY PATHOLOGIST: CPT

## 2024-07-16 PROCEDURE — 81003 URINALYSIS AUTO W/O SCOPE: CPT | Performed by: OBSTETRICS & GYNECOLOGY

## 2024-07-16 PROCEDURE — 25010000002 BUPIVACAINE IN DEXTROSE 0.75-8.25 % SOLUTION

## 2024-07-16 PROCEDURE — 86901 BLOOD TYPING SEROLOGIC RH(D): CPT | Performed by: STUDENT IN AN ORGANIZED HEALTH CARE EDUCATION/TRAINING PROGRAM

## 2024-07-16 PROCEDURE — 25010000002 FENTANYL CITRATE (PF) 50 MCG/ML SOLUTION

## 2024-07-16 PROCEDURE — 85027 COMPLETE CBC AUTOMATED: CPT | Performed by: STUDENT IN AN ORGANIZED HEALTH CARE EDUCATION/TRAINING PROGRAM

## 2024-07-16 PROCEDURE — 25010000002 PHENYLEPHRINE 10 MG/ML SOLUTION

## 2024-07-16 PROCEDURE — 0UT70ZZ RESECTION OF BILATERAL FALLOPIAN TUBES, OPEN APPROACH: ICD-10-PCS | Performed by: OBSTETRICS & GYNECOLOGY

## 2024-07-16 PROCEDURE — 80306 DRUG TEST PRSMV INSTRMNT: CPT | Performed by: OBSTETRICS & GYNECOLOGY

## 2024-07-16 PROCEDURE — 59515 CESAREAN DELIVERY: CPT | Performed by: OBSTETRICS & GYNECOLOGY

## 2024-07-16 PROCEDURE — S0260 H&P FOR SURGERY: HCPCS | Performed by: OBSTETRICS & GYNECOLOGY

## 2024-07-16 PROCEDURE — 25010000002 CEFAZOLIN PER 500 MG: Performed by: OBSTETRICS & GYNECOLOGY

## 2024-07-16 PROCEDURE — 86780 TREPONEMA PALLIDUM: CPT | Performed by: STUDENT IN AN ORGANIZED HEALTH CARE EDUCATION/TRAINING PROGRAM

## 2024-07-16 PROCEDURE — 25810000003 LACTATED RINGERS SOLUTION: Performed by: STUDENT IN AN ORGANIZED HEALTH CARE EDUCATION/TRAINING PROGRAM

## 2024-07-16 PROCEDURE — 25810000003 LACTATED RINGERS PER 1000 ML: Performed by: STUDENT IN AN ORGANIZED HEALTH CARE EDUCATION/TRAINING PROGRAM

## 2024-07-16 PROCEDURE — 88302 TISSUE EXAM BY PATHOLOGIST: CPT | Performed by: OBSTETRICS & GYNECOLOGY

## 2024-07-16 PROCEDURE — 58611 LIGATE OVIDUCT(S) ADD-ON: CPT | Performed by: OBSTETRICS & GYNECOLOGY

## 2024-07-16 PROCEDURE — 25010000002 ONDANSETRON PER 1 MG

## 2024-07-16 PROCEDURE — 25010000002 KETOROLAC TROMETHAMINE PER 15 MG

## 2024-07-16 PROCEDURE — 25010000002 DIPHENHYDRAMINE PER 50 MG

## 2024-07-16 PROCEDURE — 86850 RBC ANTIBODY SCREEN: CPT | Performed by: STUDENT IN AN ORGANIZED HEALTH CARE EDUCATION/TRAINING PROGRAM

## 2024-07-16 RX ORDER — OXYCODONE HYDROCHLORIDE 5 MG/1
5 TABLET ORAL EVERY 4 HOURS PRN
Status: DISCONTINUED | OUTPATIENT
Start: 2024-07-16 | End: 2024-07-18 | Stop reason: HOSPADM

## 2024-07-16 RX ORDER — DIPHENHYDRAMINE HYDROCHLORIDE 50 MG/ML
25 INJECTION INTRAMUSCULAR; INTRAVENOUS EVERY 4 HOURS PRN
Status: DISCONTINUED | OUTPATIENT
Start: 2024-07-16 | End: 2024-07-18 | Stop reason: HOSPADM

## 2024-07-16 RX ORDER — PHENYLEPHRINE HYDROCHLORIDE 10 MG/ML
INJECTION INTRAVENOUS AS NEEDED
Status: DISCONTINUED | OUTPATIENT
Start: 2024-07-16 | End: 2024-07-16 | Stop reason: SURG

## 2024-07-16 RX ORDER — METHYLERGONOVINE MALEATE 0.2 MG/ML
200 INJECTION INTRAVENOUS ONCE AS NEEDED
Status: DISCONTINUED | OUTPATIENT
Start: 2024-07-16 | End: 2024-07-18 | Stop reason: HOSPADM

## 2024-07-16 RX ORDER — OXYTOCIN/0.9 % SODIUM CHLORIDE 30/500 ML
PLASTIC BAG, INJECTION (ML) INTRAVENOUS
Status: COMPLETED
Start: 2024-07-16 | End: 2024-07-16

## 2024-07-16 RX ORDER — CARBOPROST TROMETHAMINE 250 UG/ML
250 INJECTION, SOLUTION INTRAMUSCULAR
Status: DISCONTINUED | OUTPATIENT
Start: 2024-07-16 | End: 2024-07-18 | Stop reason: HOSPADM

## 2024-07-16 RX ORDER — FAMOTIDINE 10 MG/ML
INJECTION, SOLUTION INTRAVENOUS
Status: DISPENSED
Start: 2024-07-16 | End: 2024-07-16

## 2024-07-16 RX ORDER — FAMOTIDINE 10 MG/ML
INJECTION, SOLUTION INTRAVENOUS AS NEEDED
Status: DISCONTINUED | OUTPATIENT
Start: 2024-07-16 | End: 2024-07-16 | Stop reason: SURG

## 2024-07-16 RX ORDER — MISOPROSTOL 200 UG/1
TABLET ORAL
Status: DISCONTINUED
Start: 2024-07-16 | End: 2024-07-16 | Stop reason: WASHOUT

## 2024-07-16 RX ORDER — BUPIVACAINE HYDROCHLORIDE 7.5 MG/ML
INJECTION, SOLUTION INTRASPINAL AS NEEDED
Status: DISCONTINUED | OUTPATIENT
Start: 2024-07-16 | End: 2024-07-16 | Stop reason: SURG

## 2024-07-16 RX ORDER — ONDANSETRON 2 MG/ML
INJECTION INTRAMUSCULAR; INTRAVENOUS AS NEEDED
Status: DISCONTINUED | OUTPATIENT
Start: 2024-07-16 | End: 2024-07-16 | Stop reason: SURG

## 2024-07-16 RX ORDER — ONDANSETRON 2 MG/ML
4 INJECTION INTRAMUSCULAR; INTRAVENOUS ONCE AS NEEDED
Status: DISCONTINUED | OUTPATIENT
Start: 2024-07-16 | End: 2024-07-18 | Stop reason: HOSPADM

## 2024-07-16 RX ORDER — PROMETHAZINE HYDROCHLORIDE 25 MG/1
25 TABLET ORAL EVERY 6 HOURS PRN
Status: DISCONTINUED | OUTPATIENT
Start: 2024-07-16 | End: 2024-07-18 | Stop reason: HOSPADM

## 2024-07-16 RX ORDER — SODIUM CHLORIDE 9 MG/ML
40 INJECTION, SOLUTION INTRAVENOUS AS NEEDED
Status: DISCONTINUED | OUTPATIENT
Start: 2024-07-16 | End: 2024-07-16

## 2024-07-16 RX ORDER — FENTANYL CITRATE 50 UG/ML
INJECTION, SOLUTION INTRAMUSCULAR; INTRAVENOUS AS NEEDED
Status: DISCONTINUED | OUTPATIENT
Start: 2024-07-16 | End: 2024-07-16 | Stop reason: SURG

## 2024-07-16 RX ORDER — SODIUM CHLORIDE, SODIUM LACTATE, POTASSIUM CHLORIDE, CALCIUM CHLORIDE 600; 310; 30; 20 MG/100ML; MG/100ML; MG/100ML; MG/100ML
125 INJECTION, SOLUTION INTRAVENOUS CONTINUOUS
Status: DISCONTINUED | OUTPATIENT
Start: 2024-07-16 | End: 2024-07-18 | Stop reason: HOSPADM

## 2024-07-16 RX ORDER — PRENATAL VIT/IRON FUM/FOLIC AC 27MG-0.8MG
1 TABLET ORAL DAILY
Status: DISCONTINUED | OUTPATIENT
Start: 2024-07-16 | End: 2024-07-18 | Stop reason: HOSPADM

## 2024-07-16 RX ORDER — EPHEDRINE SULFATE 5 MG/ML
INJECTION INTRAVENOUS AS NEEDED
Status: DISCONTINUED | OUTPATIENT
Start: 2024-07-16 | End: 2024-07-16 | Stop reason: SURG

## 2024-07-16 RX ORDER — KETOROLAC TROMETHAMINE 30 MG/ML
30 INJECTION, SOLUTION INTRAMUSCULAR; INTRAVENOUS ONCE
Status: COMPLETED | OUTPATIENT
Start: 2024-07-16 | End: 2024-07-16

## 2024-07-16 RX ORDER — OXYTOCIN/0.9 % SODIUM CHLORIDE 30/500 ML
250 PLASTIC BAG, INJECTION (ML) INTRAVENOUS CONTINUOUS
Status: ACTIVE | OUTPATIENT
Start: 2024-07-16 | End: 2024-07-16

## 2024-07-16 RX ORDER — OXYTOCIN/0.9 % SODIUM CHLORIDE 30/500 ML
PLASTIC BAG, INJECTION (ML) INTRAVENOUS CONTINUOUS PRN
Status: DISCONTINUED | OUTPATIENT
Start: 2024-07-16 | End: 2024-07-16 | Stop reason: SURG

## 2024-07-16 RX ORDER — DIPHENHYDRAMINE HCL 25 MG
25 CAPSULE ORAL EVERY 4 HOURS PRN
Status: DISCONTINUED | OUTPATIENT
Start: 2024-07-16 | End: 2024-07-18 | Stop reason: HOSPADM

## 2024-07-16 RX ORDER — PROMETHAZINE HYDROCHLORIDE 25 MG/1
12.5 SUPPOSITORY RECTAL EVERY 6 HOURS PRN
Status: DISCONTINUED | OUTPATIENT
Start: 2024-07-16 | End: 2024-07-18 | Stop reason: HOSPADM

## 2024-07-16 RX ORDER — KETOROLAC TROMETHAMINE 30 MG/ML
15 INJECTION, SOLUTION INTRAMUSCULAR; INTRAVENOUS EVERY 6 HOURS
Qty: 12 ML | Refills: 0 | Status: DISCONTINUED | OUTPATIENT
Start: 2024-07-16 | End: 2024-07-17

## 2024-07-16 RX ORDER — ACETAMINOPHEN 325 MG/1
650 TABLET ORAL EVERY 6 HOURS
Status: DISCONTINUED | OUTPATIENT
Start: 2024-07-17 | End: 2024-07-18 | Stop reason: HOSPADM

## 2024-07-16 RX ORDER — FERROUS SULFATE 325(65) MG
324 TABLET ORAL
Status: DISCONTINUED | OUTPATIENT
Start: 2024-07-16 | End: 2024-07-17

## 2024-07-16 RX ORDER — SCOLOPAMINE TRANSDERMAL SYSTEM 1 MG/1
PATCH, EXTENDED RELEASE TRANSDERMAL AS NEEDED
Status: DISCONTINUED | OUTPATIENT
Start: 2024-07-16 | End: 2024-07-16 | Stop reason: SURG

## 2024-07-16 RX ORDER — OXYCODONE HYDROCHLORIDE 5 MG/1
10 TABLET ORAL EVERY 4 HOURS PRN
Status: DISCONTINUED | OUTPATIENT
Start: 2024-07-16 | End: 2024-07-18 | Stop reason: HOSPADM

## 2024-07-16 RX ORDER — KETOROLAC TROMETHAMINE 30 MG/ML
INJECTION, SOLUTION INTRAMUSCULAR; INTRAVENOUS
Status: COMPLETED
Start: 2024-07-16 | End: 2024-07-16

## 2024-07-16 RX ORDER — OXYTOCIN/0.9 % SODIUM CHLORIDE 30/500 ML
125 PLASTIC BAG, INJECTION (ML) INTRAVENOUS ONCE AS NEEDED
Status: DISCONTINUED | OUTPATIENT
Start: 2024-07-16 | End: 2024-07-18 | Stop reason: HOSPADM

## 2024-07-16 RX ORDER — OXYTOCIN/0.9 % SODIUM CHLORIDE 30/500 ML
999 PLASTIC BAG, INJECTION (ML) INTRAVENOUS ONCE
Status: COMPLETED | OUTPATIENT
Start: 2024-07-16 | End: 2024-07-16

## 2024-07-16 RX ORDER — SIMETHICONE 80 MG
80 TABLET,CHEWABLE ORAL 4 TIMES DAILY PRN
Status: DISCONTINUED | OUTPATIENT
Start: 2024-07-16 | End: 2024-07-18 | Stop reason: HOSPADM

## 2024-07-16 RX ORDER — POTASSIUM CHLORIDE 20 MEQ/1
20 TABLET, EXTENDED RELEASE ORAL DAILY
Status: COMPLETED | OUTPATIENT
Start: 2024-07-16 | End: 2024-07-17

## 2024-07-16 RX ORDER — LIDOCAINE HYDROCHLORIDE 10 MG/ML
0.5 INJECTION, SOLUTION INFILTRATION; PERINEURAL ONCE AS NEEDED
Status: DISCONTINUED | OUTPATIENT
Start: 2024-07-16 | End: 2024-07-16

## 2024-07-16 RX ORDER — MORPHINE SULFATE 1 MG/ML
INJECTION, SOLUTION EPIDURAL; INTRATHECAL; INTRAVENOUS AS NEEDED
Status: DISCONTINUED | OUTPATIENT
Start: 2024-07-16 | End: 2024-07-16 | Stop reason: SURG

## 2024-07-16 RX ORDER — SODIUM CHLORIDE 0.9 % (FLUSH) 0.9 %
10 SYRINGE (ML) INJECTION EVERY 12 HOURS SCHEDULED
Status: DISCONTINUED | OUTPATIENT
Start: 2024-07-16 | End: 2024-07-16

## 2024-07-16 RX ORDER — MISOPROSTOL 200 UG/1
800 TABLET ORAL ONCE AS NEEDED
Status: DISCONTINUED | OUTPATIENT
Start: 2024-07-16 | End: 2024-07-18 | Stop reason: HOSPADM

## 2024-07-16 RX ORDER — ACETAMINOPHEN 500 MG
1000 TABLET ORAL EVERY 6 HOURS
Status: DISPENSED | OUTPATIENT
Start: 2024-07-16 | End: 2024-07-17

## 2024-07-16 RX ORDER — ACETAMINOPHEN 500 MG
1000 TABLET ORAL ONCE
Status: COMPLETED | OUTPATIENT
Start: 2024-07-16 | End: 2024-07-16

## 2024-07-16 RX ORDER — SODIUM CHLORIDE 0.9 % (FLUSH) 0.9 %
10 SYRINGE (ML) INJECTION AS NEEDED
Status: DISCONTINUED | OUTPATIENT
Start: 2024-07-16 | End: 2024-07-16

## 2024-07-16 RX ADMIN — FERROUS SULFATE TAB 325 MG (65 MG ELEMENTAL FE) 324 MG: 325 (65 FE) TAB at 12:31

## 2024-07-16 RX ADMIN — Medication 250 ML/HR: at 11:00

## 2024-07-16 RX ADMIN — SIMETHICONE 80 MG: 80 TABLET, CHEWABLE ORAL at 17:45

## 2024-07-16 RX ADMIN — PHENYLEPHRINE HYDROCHLORIDE 200 MCG: 10 INJECTION INTRAVENOUS at 09:35

## 2024-07-16 RX ADMIN — MORPHINE SULFATE 150 MCG: 1 INJECTION, SOLUTION EPIDURAL; INTRATHECAL; INTRAVENOUS at 09:35

## 2024-07-16 RX ADMIN — CEFAZOLIN 2000 MG: 2 INJECTION, POWDER, FOR SOLUTION INTRAMUSCULAR; INTRAVENOUS at 09:22

## 2024-07-16 RX ADMIN — SCOPALAMINE 1 PATCH: 1 PATCH, EXTENDED RELEASE TRANSDERMAL at 08:45

## 2024-07-16 RX ADMIN — KETOROLAC TROMETHAMINE 15 MG: 30 INJECTION, SOLUTION INTRAMUSCULAR; INTRAVENOUS at 17:43

## 2024-07-16 RX ADMIN — EPHEDRINE SULFATE 5 MG: 5 INJECTION INTRAVENOUS at 09:45

## 2024-07-16 RX ADMIN — ONDANSETRON 4 MG: 2 INJECTION INTRAMUSCULAR; INTRAVENOUS at 09:27

## 2024-07-16 RX ADMIN — SODIUM CHLORIDE, POTASSIUM CHLORIDE, SODIUM LACTATE AND CALCIUM CHLORIDE 125 ML/HR: 600; 310; 30; 20 INJECTION, SOLUTION INTRAVENOUS at 08:46

## 2024-07-16 RX ADMIN — ACETAMINOPHEN 1000 MG: 500 TABLET ORAL at 21:36

## 2024-07-16 RX ADMIN — KETOROLAC TROMETHAMINE 30 MG: 30 INJECTION, SOLUTION INTRAMUSCULAR; INTRAVENOUS at 10:47

## 2024-07-16 RX ADMIN — BUPIVACAINE HYDROCHLORIDE IN DEXTROSE 1.7 ML: 7.5 INJECTION, SOLUTION SUBARACHNOID at 09:35

## 2024-07-16 RX ADMIN — OXYTOCIN-SODIUM CHLORIDE 0.9% IV SOLN 30 UNIT/500ML 999 ML/HR: 30-0.9/5 SOLUTION at 10:40

## 2024-07-16 RX ADMIN — PRENATAL VIT W/ FE FUMARATE-FA TAB 27-0.8 MG 1 TABLET: 27-0.8 TAB at 12:31

## 2024-07-16 RX ADMIN — SODIUM CHLORIDE, POTASSIUM CHLORIDE, SODIUM LACTATE AND CALCIUM CHLORIDE 1000 ML: 600; 310; 30; 20 INJECTION, SOLUTION INTRAVENOUS at 07:40

## 2024-07-16 RX ADMIN — FENTANYL CITRATE 15 MCG: 50 INJECTION, SOLUTION INTRAMUSCULAR; INTRAVENOUS at 09:35

## 2024-07-16 RX ADMIN — Medication 999 ML/HR: at 09:55

## 2024-07-16 RX ADMIN — ACETAMINOPHEN 1000 MG: 500 TABLET ORAL at 14:30

## 2024-07-16 RX ADMIN — FAMOTIDINE 20 MG: 10 INJECTION INTRAVENOUS at 08:45

## 2024-07-16 RX ADMIN — ACETAMINOPHEN 1000 MG: 500 TABLET ORAL at 08:12

## 2024-07-16 RX ADMIN — DIPHENHYDRAMINE HYDROCHLORIDE 12.5 MG: 50 INJECTION, SOLUTION INTRAMUSCULAR; INTRAVENOUS at 10:40

## 2024-07-16 RX ADMIN — SODIUM CHLORIDE, POTASSIUM CHLORIDE, SODIUM LACTATE AND CALCIUM CHLORIDE 125 ML/HR: 600; 310; 30; 20 INJECTION, SOLUTION INTRAVENOUS at 11:18

## 2024-07-16 RX ADMIN — POTASSIUM CHLORIDE 20 MEQ: 1500 TABLET, EXTENDED RELEASE ORAL at 12:31

## 2024-07-16 NOTE — PLAN OF CARE
Problem: Adult Inpatient Plan of Care  Goal: Plan of Care Review  Outcome: Ongoing, Progressing  Goal: Patient-Specific Goal (Individualized)  Outcome: Ongoing, Progressing  Goal: Absence of Hospital-Acquired Illness or Injury  Outcome: Ongoing, Progressing  Intervention: Identify and Manage Fall Risk  Recent Flowsheet Documentation  Taken 7/16/2024 1430 by Rachelle Candelaria RN  Safety Promotion/Fall Prevention: safety round/check completed  Taken 7/16/2024 0821 by Rachelle Candelaria RN  Safety Promotion/Fall Prevention: safety round/check completed  Intervention: Prevent and Manage VTE (Venous Thromboembolism) Risk  Recent Flowsheet Documentation  Taken 7/16/2024 1557 by Rachelle Candelaria RN  Activity Management: ambulated to bathroom  Taken 7/16/2024 1430 by Rachelle Candelaria RN  Activity Management: up ad juani  VTE Prevention/Management:   bilateral   sequential compression devices on  Taken 7/16/2024 1213 by Rachelle Candelaria RN  Activity Management: bedrest  Taken 7/16/2024 1046 by Rachelle Candelaria RN  Activity Management: bedrest  VTE Prevention/Management:   bilateral   sequential compression devices on  Taken 7/16/2024 0821 by Rachelle Candelaria RN  Activity Management: up ad juani  Goal: Optimal Comfort and Wellbeing  Outcome: Ongoing, Progressing  Intervention: Monitor Pain and Promote Comfort  Recent Flowsheet Documentation  Taken 7/16/2024 1430 by Rachelle Candelaria RN  Pain Management Interventions: see MAR  Taken 7/16/2024 1142 by Rachelle Candelaria RN  Pain Management Interventions: no interventions per patient request  Taken 7/16/2024 1116 by Rachelle Candelaria RN  Pain Management Interventions: no interventions per patient request  Taken 7/16/2024 1047 by Rachelle Candelaria RN  Pain Management Interventions: see MAR  Intervention: Provide Person-Centered Care  Recent Flowsheet Documentation  Taken 7/16/2024 1430 by Rachelle Candelaria RN  Trust Relationship/Rapport:   care explained   choices provided  Taken 7/16/2024 1046 by  Rachelle Candelaria RN  Trust Relationship/Rapport: thoughts/feelings acknowledged  Goal: Readiness for Transition of Care  Outcome: Ongoing, Progressing  Intervention: Mutually Develop Transition Plan  Recent Flowsheet Documentation  Taken 2024 0745 by Rachelle Candelaria RN  Equipment Currently Used at Home: none  Taken 2024 0742 by Rachelle Candelaria RN  Transportation Anticipated: family or friend will provide  Patient/Family Anticipated Services at Transition: none  Patient/Family Anticipates Transition to: home     Problem: Adult Inpatient Plan of Care  Goal: Optimal Comfort and Wellbeing  Outcome: Ongoing, Progressing  Intervention: Monitor Pain and Promote Comfort  Recent Flowsheet Documentation  Taken 2024 1430 by Rachelle Candelaria RN  Pain Management Interventions: see MAR  Taken 2024 1142 by Rachelle Candelaria RN  Pain Management Interventions: no interventions per patient request  Taken 2024 1116 by Rachelle Candelaria RN  Pain Management Interventions: no interventions per patient request  Taken 2024 1047 by Rachelle Candelaria RN  Pain Management Interventions: see MAR  Intervention: Provide Person-Centered Care  Recent Flowsheet Documentation  Taken 2024 1430 by Rachelle Candelaria RN  Trust Relationship/Rapport:   care explained   choices provided  Taken 2024 1046 by Rachelle Candelaria RN  Trust Relationship/Rapport: thoughts/feelings acknowledged     Problem: Bleeding ( Delivery)  Goal: Bleeding is Controlled  Outcome: Ongoing, Progressing     Problem: Change in Fetal Wellbeing ( Delivery)  Goal: Stable Fetal Wellbeing  Outcome: Ongoing, Progressing     Problem: Infection ( Delivery)  Goal: Absence of Infection Signs and Symptoms  Outcome: Ongoing, Progressing   Goal Outcome Evaluation:

## 2024-07-16 NOTE — L&D DELIVERY NOTE
[facility]   Section Operative Note    Pre-Operative Dx:   1) 33 y.o. G 12 P 8038  2) IUP at 30-2/7 weeks  3) Indications for  section: Prior   4) intrauterine growth restriction  5) desires permanent sterilization         Postoperative dx:    1.  Same     Procedure: , Low Transverse , bilateral salpingectomy   Surgeon: Brian Gan     Assistant: Assistant: Donald Putnam CSA was responsible for performing the following activities: Retraction, Suction, Irrigation, Suturing, Closing, and Delivery of Fetus and their skilled assistance was necessary for the success of this case.    Anesthesia: Spinal    EBL:   mls.  1000  mls.         IV Fluids: 500 mls.   UOP: 200 mls.    I/O this shift:  In: -   Out: 250 [Urine:250]   Antibiotics: cefazolin (Ancef)     Infant:      Time of Delivery     Gender: male  infant    Weight: 2580 g (5 lb 11 oz)     Apgars:   @ 1 minute /       @ 5 minutes      Meconium:   Nuchal Cord:    no  yes            Indication for C/Section: Previous  section                                     Procedure Details:   Once all questions were answered, the patient was given IV antibiotics for ID prophylaxis. Pt was taken to the OR where spinal anesthesia was administered. A langley catheter was placed and hung to gravity for the duration of the procedure. SCD's were placed on the lower extremities bilaterally for DVT prophylaxis. Once the pt was prepped and draped and anesthesia was found to be adequate, a Pfannensteil skin incision was made on the abdomen and carried down to the fascia. The fascia was incised and extended with Carter scissors. Kocher clamps were placed on the superior and inferior aspect of the fascia and the rectus abdominal muscles were sharply and bluntly taken down. The rectus abdominal muscles were  in the midline and the periteoneum was entered and bluntly extended. A bladder blade was then inserted and a bladder flap  was created. A low transverse incision was made on the uterus and bluntly extended. Artificial rupture of membranes was performed with clear fluid noted. The fetal head was delivered followed by the rest of the body. Cord was clamped and cut and baby taken to the warmer. Cord blood was collected and the placenta was delivered. The uterus was exteriorized and cleared of all clots and debris. The uterine incision was repaired in 2 layers. The first layer was a running and locked fashion with 0-Vicryl and the second layer was an imbricating repair with 0-Vicryl suture. The uterus was firm and hemostatic.  Both tubes were excised using the LigaSure.  The abdomen was irrigated and aspirated with warm normal saline. The uterus was placed back into the abdomen.  Hemostatic powder was placed over the incision.  The fascia was reapproximated with 0-vicryl suture. The subcutaneous layer was irrigated and cauterized as needed for hemostasis. The skin was reapproximated with 4-0 vicryl. Pt tolerated the procedure well. Mom and baby are stable and progressing well.      Complications:   None      Disposition:   Mother to Mother Baby/Postpartum  in stable condition currently.   Baby to NBN  in stable condition currently.       Brian Russo MD  7/16/2024  10:22 EDT

## 2024-07-16 NOTE — ANESTHESIA PROCEDURE NOTES
Spinal Block    Pre-sedation assessment completed: 7/16/2024 9:32 AM    Patient reassessed immediately prior to procedure    Patient location during procedure: OR  Start Time: 7/16/2024 9:33 AM  Stop Time: 7/16/2024 9:35 AM  Indication:at surgeon's request and post-op pain management  Performed By  CRNA/CAA: Preston Vega CRNA  Preanesthetic Checklist  Completed: patient identified, IV checked, site marked, risks and benefits discussed, surgical consent, monitors and equipment checked, pre-op evaluation and timeout performed  Spinal Block Prep:  Patient Position:sitting  Sterile Tech:cap, gloves, mask and sterile barriers  Prep:Chloraprep  Patient Monitoring:blood pressure monitoring, continuous pulse oximetry and EKG    Spinal Block Procedure  Approach:midline  Guidance:landmark technique and palpation technique  Location:L3-L4  Needle Type:Sprotte  Needle Gauge:25 G  Placement of Spinal needle event:cerebrospinal fluid aspirated  Paresthesia: no  Fluid Appearance:clear     Post Assessment  Patient Tolerance:patient tolerated the procedure well with no apparent complications  Complications no  Additional Notes  .75% bupi in dextrose  150 mcg morphine  15mcg fent

## 2024-07-16 NOTE — ANESTHESIA POSTPROCEDURE EVALUATION
Patient: Raina Grigsby    Procedure Summary       Date: 24 Room / Location: [facility] LABOR DELIVERY 1 / [facility] LABOR DELIVERY    Anesthesia Start: 926 Anesthesia Stop:     Procedure:  SECTION REPEAT WITH TUBAL ( Bilateral salpingectomy) (Abdomen) Diagnosis:       Request for sterilization      S/P emergency  section      (Request for sterilization [Z30.2])      (S/P emergency  section [Z98.891])    Surgeons: Brian Russo MD Provider: Pretson Vega CRNA    Anesthesia Type: spinal ASA Status: 2            Anesthesia Type: spinal    Vitals  Vitals Value Taken Time   /68 24 1116   Temp 97.7 °F (36.5 °C) 24 1046   Pulse 57 24 1116   Resp 18 24 1046   SpO2 99 % 24 1116           Post Anesthesia Care and Evaluation    Patient location during evaluation: bedside  Patient participation: complete - patient participated  Level of consciousness: awake and alert  Pain score: 0  Pain management: adequate    Airway patency: patent  Anesthetic complications: No anesthetic complications  PONV Status: none  Cardiovascular status: acceptable  Respiratory status: acceptable  Hydration status: acceptable  Post Neuraxial Block status: No signs or symptoms of PDPH       Please see previous entry for full consultation

## 2024-07-16 NOTE — ANESTHESIA PREPROCEDURE EVALUATION
Anesthesia Evaluation     Patient summary reviewed and Nursing notes reviewed   no history of anesthetic complications:   NPO Solid Status: > 8 hours  NPO Liquid Status: > 2 hours           Airway   Mallampati: II  TM distance: >3 FB  Neck ROM: full  Possible difficult intubation  Dental    (+) poor dentition    Comment: Very poor dentition; many chipped and cracked teeth; nothing loose        Pulmonary - normal exam   (+) a smoker (quit 4-5 months ago) Former,  Cardiovascular - normal exam  Exercise tolerance: good (4-7 METS)        Neuro/Psych  (+) headaches  GI/Hepatic/Renal/Endo    (+) GERD well controlled    Musculoskeletal (-) negative ROS    Abdominal Obesity: gravid.   Substance History   (+) drug use (MJ use before pregnancy)     OB/GYN    (+) Pregnant  (-) history of pregnancy induced hypertension (hx of gestational HTN, not for this pregnancy)    Comment: Hx of placental abruption with last pregnancy; s/p emergency         Other        ROS/Med Hx Other: 0530 gatorade 20 ounces                    Anesthesia Plan    ASA 2     spinal       Anesthetic plan, risks, benefits, and alternatives have been provided, discussed and informed consent has been obtained with: patient and spouse/significant other.  Pre-procedure education provided  Use of blood products discussed with spouse/significant other and patient  Consented to blood products.    Plan discussed with CRNA.        CODE STATUS:    Level Of Support Discussed With: Patient  Code Status (Patient has no pulse and is not breathing): CPR (Attempt to Resuscitate)  Medical Interventions (Patient has pulse or is breathing): Full Support

## 2024-07-16 NOTE — H&P
Patient Care Team:  Lucina Burns APRN as PCP - General (Nurse Practitioner)  Gauri Han APRN as Nurse Practitioner (Obstetrics and Gynecology)    Chief complaint previous , fetal growth restriction       HPI: This is a 33-year-old  12 para 8-0-3-8 at 38-2/7 weeks whose pregnancy has been complicated by fetal growth restriction.  She had normal Dopplers.  She had reassuring  testing and [facility] recommended delivery between 38 and 39 weeks.  She desires permanent sterilization.  She reports active fetal movement, no vaginal bleeding and no loss of fluid.  She has a history of preeclampsia but no signs or symptoms of currently.  She is O+ blood type and her GBS is negative.    ROS:   Constitutional: Negative for appetite change, fever and unexpected weight change.   Respiratory: Negative for cough and shortness of breath.    Cardiovascular: Negative for chest pain and palpitations.   Gastrointestinal: Negative for abdominal pain, constipation, diarrhea, nausea and vomiting.   Endocrine: Negative.    Genitourinary: Negative for dyspareunia, pelvic pain and vaginal discharge.   Skin: Negative.    Neurological: Negative for dizziness and headaches.   Hematological: Negative.    Psychiatric/Behavioral: Negative for dysphoric mood and sleep disturbance. The patient is not nervous/anxious.        PMH:   Past Medical History:   Diagnosis Date    Gestational hypertension 3/30/2023    Hidradenitis          PSH:   Past Surgical History:   Procedure Laterality Date     SECTION N/A 3/30/2023    Procedure:  SECTION PRIMARY;  Surgeon: Ester Hudson MD;  Location: [facility] LABOR DELIVERY;  Service: Obstetrics/Gynecology;  Laterality: N/A;    GALLBLADDER SURGERY      TONSILLECTOMY      WISDOM TOOTH EXTRACTION         SoHx:   Social History     Socioeconomic History    Marital status: Single   Tobacco Use    Smoking status: Former     Current packs/day: 0.50     Types:  Cigarettes    Smokeless tobacco: Never   Vaping Use    Vaping status: Never Used   Substance and Sexual Activity    Alcohol use: Never    Drug use: Not Currently     Types: Marijuana    Sexual activity: Yes     Partners: Male       FHx:   Family History   Problem Relation Age of Onset    Alcohol abuse Mother     Hypertension Father     COPD Father     Diabetes Father         Recently diagnosed    Breast cancer Maternal Grandmother     Diabetes Paternal Grandmother     Prostate cancer Paternal Grandfather        PGyn Hx: Deferred    POBHx: See HPI    Allergies: Egg white (egg protein), Ibuprofen, Peanut (diagnostic), and Lactose intolerance [tilactase]    Medications:   No current facility-administered medications on file prior to encounter.     Current Outpatient Medications on File Prior to Encounter   Medication Sig Dispense Refill    acetaminophen (TYLENOL) 500 MG tablet Take 1 tablet by mouth Every 6 (Six) Hours As Needed for Mild Pain.      aspirin 81 MG chewable tablet Chew 1 tablet Daily. 30 tablet 11    ferrous gluconate (FERGON) 324 MG tablet Take 1 tablet by mouth Daily With Breakfast. 100 tablet 2    ondansetron ODT (ZOFRAN-ODT) 4 MG disintegrating tablet Place 1 tablet on the tongue Every 8 (Eight) Hours As Needed for Nausea or Vomiting. 30 tablet 2    Prenatal Vit-Fe Fumarate-FA (prenatal vitamin 27-0.8) 27-0.8 MG tablet tablet Take  by mouth Daily.               Vital Signs  Temp:  [98.3 °F (36.8 °C)] 98.3 °F (36.8 °C)  Heart Rate:  [73] 73  Resp:  [16] 16  BP: (141)/(67) 141/67    Physical Exam:  Constitutional: She is oriented to person, place, and time. She appears well-developed and well-nourished.   HENT:   Head: Normocephalic and atraumatic.   Cardiovascular: Normal rate and regular rhythm.    Pulmonary/Chest: Effort normal. No respiratory distress.   Abdominal: Soft. Bowel sounds are normal. There is no tenderness. There is no rebound and no guarding.   Musculoskeletal: Normal range of motion.    Neurological: She is alert and oriented to person, place, and time.   Skin: Skin is warm and dry.   Psychiatric: She has a normal mood and affect. Her behavior is normal. Judgment and thought content normal.   Nursing note and vitals reviewed.  Debilities/Disabilities Identified: None  Emotional Behavior: Appropriate    Labs: O+ blood type, GBS negative.  Hemoglobin 10.1.  Potassium low at 3.0      Assessment/Plan: IUP at 38-2/7 weeks, fetal growth restriction, history of preeclampsia, previous .,  Desires permanent sterilization, hypokalemia    Plan repeat low-transverse  section with bilateral salpingectomy.      Brian Russo MD  24  09:14 EDT

## 2024-07-17 LAB
ALBUMIN SERPL-MCNC: 2.6 G/DL (ref 3.5–5.2)
ALBUMIN/GLOB SERPL: 1.2 G/DL
ALP SERPL-CCNC: 96 U/L (ref 39–117)
ALT SERPL W P-5'-P-CCNC: 7 U/L (ref 1–33)
ANION GAP SERPL CALCULATED.3IONS-SCNC: 9 MMOL/L (ref 5–15)
AST SERPL-CCNC: 18 U/L (ref 1–32)
BASOPHILS # BLD AUTO: 0.02 10*3/MM3 (ref 0–0.2)
BASOPHILS NFR BLD AUTO: 0.2 % (ref 0–1.5)
BILIRUB SERPL-MCNC: <0.2 MG/DL (ref 0–1.2)
BUN SERPL-MCNC: 4 MG/DL (ref 6–20)
BUN/CREAT SERPL: 10.5 (ref 7–25)
CALCIUM SPEC-SCNC: 8 MG/DL (ref 8.6–10.5)
CHLORIDE SERPL-SCNC: 106 MMOL/L (ref 98–107)
CO2 SERPL-SCNC: 23 MMOL/L (ref 22–29)
CREAT SERPL-MCNC: 0.38 MG/DL (ref 0.57–1)
DEPRECATED RDW RBC AUTO: 47.9 FL (ref 37–54)
EGFRCR SERPLBLD CKD-EPI 2021: 136.7 ML/MIN/1.73
EOSINOPHIL # BLD AUTO: 0.2 10*3/MM3 (ref 0–0.4)
EOSINOPHIL NFR BLD AUTO: 1.9 % (ref 0.3–6.2)
ERYTHROCYTE [DISTWIDTH] IN BLOOD BY AUTOMATED COUNT: 13.4 % (ref 12.3–15.4)
GLOBULIN UR ELPH-MCNC: 2.1 GM/DL
GLUCOSE SERPL-MCNC: 94 MG/DL (ref 65–99)
HCT VFR BLD AUTO: 23.5 % (ref 34–46.6)
HGB BLD-MCNC: 8 G/DL (ref 12–15.9)
IMM GRANULOCYTES # BLD AUTO: 0.07 10*3/MM3 (ref 0–0.05)
IMM GRANULOCYTES NFR BLD AUTO: 0.7 % (ref 0–0.5)
LAB AP CASE REPORT: NORMAL
LYMPHOCYTES # BLD AUTO: 2.36 10*3/MM3 (ref 0.7–3.1)
LYMPHOCYTES NFR BLD AUTO: 22.6 % (ref 19.6–45.3)
MAGNESIUM SERPL-MCNC: 1.5 MG/DL (ref 1.6–2.6)
MCH RBC QN AUTO: 33.3 PG (ref 26.6–33)
MCHC RBC AUTO-ENTMCNC: 34 G/DL (ref 31.5–35.7)
MCV RBC AUTO: 97.9 FL (ref 79–97)
MONOCYTES # BLD AUTO: 0.58 10*3/MM3 (ref 0.1–0.9)
MONOCYTES NFR BLD AUTO: 5.6 % (ref 5–12)
NEUTROPHILS NFR BLD AUTO: 69 % (ref 42.7–76)
NEUTROPHILS NFR BLD AUTO: 7.22 10*3/MM3 (ref 1.7–7)
NRBC BLD AUTO-RTO: 0 /100 WBC (ref 0–0.2)
PATH REPORT.FINAL DX SPEC: NORMAL
PATH REPORT.GROSS SPEC: NORMAL
PLATELET # BLD AUTO: 248 10*3/MM3 (ref 140–450)
PMV BLD AUTO: 9.9 FL (ref 6–12)
POTASSIUM SERPL-SCNC: 2.7 MMOL/L (ref 3.5–5.2)
PROT SERPL-MCNC: 4.7 G/DL (ref 6–8.5)
RBC # BLD AUTO: 2.4 10*6/MM3 (ref 3.77–5.28)
SODIUM SERPL-SCNC: 138 MMOL/L (ref 136–145)
WBC NRBC COR # BLD AUTO: 10.45 10*3/MM3 (ref 3.4–10.8)

## 2024-07-17 PROCEDURE — 25010000002 KETOROLAC TROMETHAMINE PER 15 MG: Performed by: STUDENT IN AN ORGANIZED HEALTH CARE EDUCATION/TRAINING PROGRAM

## 2024-07-17 PROCEDURE — 0503F POSTPARTUM CARE VISIT: CPT | Performed by: NURSE PRACTITIONER

## 2024-07-17 PROCEDURE — 85025 COMPLETE CBC W/AUTO DIFF WBC: CPT | Performed by: OBSTETRICS & GYNECOLOGY

## 2024-07-17 PROCEDURE — 80053 COMPREHEN METABOLIC PANEL: CPT | Performed by: NURSE PRACTITIONER

## 2024-07-17 PROCEDURE — 83735 ASSAY OF MAGNESIUM: CPT | Performed by: NURSE PRACTITIONER

## 2024-07-17 RX ORDER — FERROUS SULFATE 325(65) MG
324 TABLET ORAL 2 TIMES DAILY WITH MEALS
Status: DISCONTINUED | OUTPATIENT
Start: 2024-07-17 | End: 2024-07-18 | Stop reason: HOSPADM

## 2024-07-17 RX ORDER — POTASSIUM CHLORIDE 20 MEQ/1
20 TABLET, EXTENDED RELEASE ORAL 2 TIMES DAILY
Status: COMPLETED | OUTPATIENT
Start: 2024-07-17 | End: 2024-07-18

## 2024-07-17 RX ADMIN — FERROUS SULFATE TAB 325 MG (65 MG ELEMENTAL FE) 324 MG: 325 (65 FE) TAB at 17:53

## 2024-07-17 RX ADMIN — POTASSIUM CHLORIDE 20 MEQ: 1500 TABLET, EXTENDED RELEASE ORAL at 08:05

## 2024-07-17 RX ADMIN — SIMETHICONE 80 MG: 80 TABLET, CHEWABLE ORAL at 14:29

## 2024-07-17 RX ADMIN — ACETAMINOPHEN 1000 MG: 500 TABLET ORAL at 02:15

## 2024-07-17 RX ADMIN — ACETAMINOPHEN 650 MG: 325 TABLET ORAL at 14:32

## 2024-07-17 RX ADMIN — KETOROLAC TROMETHAMINE 15 MG: 30 INJECTION, SOLUTION INTRAMUSCULAR; INTRAVENOUS at 14:29

## 2024-07-17 RX ADMIN — FERROUS SULFATE TAB 325 MG (65 MG ELEMENTAL FE) 324 MG: 325 (65 FE) TAB at 08:05

## 2024-07-17 RX ADMIN — KETOROLAC TROMETHAMINE 15 MG: 30 INJECTION, SOLUTION INTRAMUSCULAR; INTRAVENOUS at 02:15

## 2024-07-17 RX ADMIN — ACETAMINOPHEN 650 MG: 325 TABLET ORAL at 08:16

## 2024-07-17 RX ADMIN — PRENATAL VIT W/ FE FUMARATE-FA TAB 27-0.8 MG 1 TABLET: 27-0.8 TAB at 08:05

## 2024-07-17 RX ADMIN — SIMETHICONE 80 MG: 80 TABLET, CHEWABLE ORAL at 08:05

## 2024-07-17 RX ADMIN — ACETAMINOPHEN 650 MG: 325 TABLET ORAL at 21:40

## 2024-07-17 RX ADMIN — KETOROLAC TROMETHAMINE 15 MG: 30 INJECTION, SOLUTION INTRAMUSCULAR; INTRAVENOUS at 08:05

## 2024-07-17 RX ADMIN — POTASSIUM CHLORIDE 20 MEQ: 1500 TABLET, EXTENDED RELEASE ORAL at 21:40

## 2024-07-17 NOTE — PROGRESS NOTES
Patient: Raina Grigsby  Procedure(s):   SECTION REPEAT WITH TUBAL ( Bilateral salpingectomy)  Anesthesia type: spinal    Patient location: Labor and Delivery  Last vitals:   Vitals:    24 1546   BP: 132/73   Pulse: 67   Resp: 16   Temp: 98.2 °F (36.8 °C)   SpO2: 97%     Level of consciousness: awake, alert, and oriented    Post-anesthesia pain: adequate analgesia  Airway patency: patent  Respiratory: unassisted  Cardiovascular: stable and blood pressure at baseline  Hydration: euvolemic    Anesthetic complications: no

## 2024-07-17 NOTE — PROGRESS NOTES
KELSEA Major   PROGRESS NOTE    Post-Op Day 1 S/P   Subjective   Subjective  Patient reports:  Pain is fairly controlled with acetaminophen and Toradol .  She is ambulating without concerns. Tolerating diet. Tolerating po -- normal.  Intake -- c/o of tolerating po solids and tolerating po liquids.   Voiding - without difficulty; flatus reported..  Vaginal bleeding is as much as expected.    Objective    Objective     Vitals: Vital Signs Range for the last 24 hours  Temperature: Temp:  [97.7 °F (36.5 °C)-98.7 °F (37.1 °C)] 98.2 °F (36.8 °C)   Temp Source: Temp src: Oral   BP: BP: (108-135)/(48-72) 113/60   Pulse: Heart Rate:  [56-72] 64   Respirations: Resp:  [16-20] 20   SPO2: SpO2:  [97 %-100 %] 97 %   O2 Amount (l/min):     O2 Devices Device (Oxygen Therapy): room air   Weight:              Physical Exam    Lungs clear to auscultation bilaterally   Abdomen Soft, non-tender, normal bowel sounds; no bruits, organomegaly or masses.   Incision  Dressing C/D/I   Extremities edema moderate and Homans sign is negative, no sign of DVT     I reviewed the patient's new clinical results.    Assessment & Plan        Status post repeat low transverse  section    Request for sterilization    S/P emergency  section    Assessment & Plan    Assessment:    Raina Grigsby is Day 1  post-partum  , Low Transverse   .      Plan:  1) Post-op Day #1 s/p RLTCS    2) Post-partum care- advance as tolerated    3) Boy- bottle; declines circ    4) anemia- Hgb 8.0g/dl; start iron supplement BID; asymptomatic; repeat H/H in am    5) Hypokalemia- K+ 3.0 on admission; received 2 doses K-Dur 20 mEq; repeat K+ 2.7; pt is stable and asymptomatic; 2 more doses K-Dur 20 mEq ordered; repeat CMP in am    6) Dispo- possible home tomorrow      Gauri Han, APRN  24  09:37 EDT

## 2024-07-17 NOTE — PLAN OF CARE
Problem: Adult Inpatient Plan of Care  Goal: Plan of Care Review  Outcome: Ongoing, Progressing  Goal: Patient-Specific Goal (Individualized)  Outcome: Ongoing, Progressing  Goal: Absence of Hospital-Acquired Illness or Injury  Outcome: Ongoing, Progressing  Intervention: Identify and Manage Fall Risk  Recent Flowsheet Documentation  Taken 7/17/2024 1439 by Rachelle Candelaria RN  Safety Promotion/Fall Prevention: safety round/check completed  Taken 7/17/2024 0805 by Rachelle Candelaria RN  Safety Promotion/Fall Prevention: safety round/check completed  Intervention: Prevent and Manage VTE (Venous Thromboembolism) Risk  Recent Flowsheet Documentation  Taken 7/17/2024 1439 by Rachelle Candelaria RN  Activity Management: up ad juani  Taken 7/17/2024 0805 by Rachelle Candelaria RN  Activity Management: up ad juani  Goal: Optimal Comfort and Wellbeing  Outcome: Ongoing, Progressing  Intervention: Monitor Pain and Promote Comfort  Recent Flowsheet Documentation  Taken 7/17/2024 1439 by Rachelle Candelaria RN  Pain Management Interventions: see MAR  Taken 7/17/2024 0805 by Rachelle Candelaria RN  Pain Management Interventions: see MAR  Goal: Readiness for Transition of Care  Outcome: Ongoing, Progressing   Goal Outcome Evaluation:

## 2024-07-18 VITALS
HEART RATE: 64 BPM | BODY MASS INDEX: 31 KG/M2 | RESPIRATION RATE: 20 BRPM | OXYGEN SATURATION: 98 % | DIASTOLIC BLOOD PRESSURE: 73 MMHG | SYSTOLIC BLOOD PRESSURE: 139 MMHG | HEIGHT: 63 IN | TEMPERATURE: 98.1 F

## 2024-07-18 PROBLEM — Z30.2 REQUEST FOR STERILIZATION: Status: RESOLVED | Noted: 2023-03-30 | Resolved: 2024-07-18

## 2024-07-18 PROBLEM — Z98.891 S/P EMERGENCY CESAREAN SECTION: Status: RESOLVED | Noted: 2023-03-30 | Resolved: 2024-07-18

## 2024-07-18 LAB
ALBUMIN SERPL-MCNC: 2.7 G/DL (ref 3.5–5.2)
ALBUMIN/GLOB SERPL: 1.3 G/DL
ALP SERPL-CCNC: 92 U/L (ref 39–117)
ALT SERPL W P-5'-P-CCNC: 8 U/L (ref 1–33)
ANION GAP SERPL CALCULATED.3IONS-SCNC: 7.2 MMOL/L (ref 5–15)
AST SERPL-CCNC: 19 U/L (ref 1–32)
BILIRUB SERPL-MCNC: 0.2 MG/DL (ref 0–1.2)
BUN SERPL-MCNC: 6 MG/DL (ref 6–20)
BUN/CREAT SERPL: 16.7 (ref 7–25)
CALCIUM SPEC-SCNC: 8.4 MG/DL (ref 8.6–10.5)
CHLORIDE SERPL-SCNC: 108 MMOL/L (ref 98–107)
CO2 SERPL-SCNC: 22.8 MMOL/L (ref 22–29)
CREAT SERPL-MCNC: 0.36 MG/DL (ref 0.57–1)
EGFRCR SERPLBLD CKD-EPI 2021: 138.5 ML/MIN/1.73
GLOBULIN UR ELPH-MCNC: 2.1 GM/DL
GLUCOSE SERPL-MCNC: 71 MG/DL (ref 65–99)
HCT VFR BLD AUTO: 22.7 % (ref 34–46.6)
HGB BLD-MCNC: 7.6 G/DL (ref 12–15.9)
POTASSIUM SERPL-SCNC: 3.2 MMOL/L (ref 3.5–5.2)
PROT SERPL-MCNC: 4.8 G/DL (ref 6–8.5)
SODIUM SERPL-SCNC: 138 MMOL/L (ref 136–145)

## 2024-07-18 PROCEDURE — 0503F POSTPARTUM CARE VISIT: CPT | Performed by: NURSE PRACTITIONER

## 2024-07-18 PROCEDURE — 85014 HEMATOCRIT: CPT | Performed by: NURSE PRACTITIONER

## 2024-07-18 PROCEDURE — 85018 HEMOGLOBIN: CPT | Performed by: NURSE PRACTITIONER

## 2024-07-18 PROCEDURE — 80053 COMPREHEN METABOLIC PANEL: CPT | Performed by: NURSE PRACTITIONER

## 2024-07-18 RX ORDER — IBUPROFEN 800 MG/1
800 TABLET ORAL EVERY 8 HOURS PRN
Qty: 30 TABLET | Refills: 0 | Status: SHIPPED | OUTPATIENT
Start: 2024-07-18

## 2024-07-18 RX ORDER — DOCUSATE SODIUM 100 MG/1
100 CAPSULE, LIQUID FILLED ORAL 2 TIMES DAILY
Qty: 60 CAPSULE | Refills: 0 | Status: SHIPPED | OUTPATIENT
Start: 2024-07-18

## 2024-07-18 RX ORDER — SIMETHICONE 80 MG
80 TABLET,CHEWABLE ORAL 4 TIMES DAILY PRN
Qty: 20 TABLET | Refills: 0 | Status: SHIPPED | OUTPATIENT
Start: 2024-07-18

## 2024-07-18 RX ORDER — FERROUS GLUCONATE 324(38)MG
324 TABLET ORAL
Qty: 100 TABLET | Refills: 2 | Status: SHIPPED | OUTPATIENT
Start: 2024-07-18

## 2024-07-18 RX ORDER — OXYCODONE HYDROCHLORIDE 5 MG/1
5 TABLET ORAL EVERY 4 HOURS PRN
Qty: 12 TABLET | Refills: 0 | Status: SHIPPED | OUTPATIENT
Start: 2024-07-18 | End: 2024-07-21

## 2024-07-18 RX ADMIN — FERROUS SULFATE TAB 325 MG (65 MG ELEMENTAL FE) 324 MG: 325 (65 FE) TAB at 09:12

## 2024-07-18 RX ADMIN — PRENATAL VIT W/ FE FUMARATE-FA TAB 27-0.8 MG 1 TABLET: 27-0.8 TAB at 09:12

## 2024-07-18 RX ADMIN — POTASSIUM CHLORIDE 20 MEQ: 1500 TABLET, EXTENDED RELEASE ORAL at 09:12

## 2024-07-18 RX ADMIN — ACETAMINOPHEN 650 MG: 325 TABLET ORAL at 09:21

## 2024-07-18 RX ADMIN — ACETAMINOPHEN 650 MG: 325 TABLET ORAL at 03:49

## 2024-07-18 NOTE — NURSING NOTE
D/c instructions discussed w/ pt and so  - they verbalized understanding and all questions answered.  Pt will call to schedule apt to be seen in 2 weeks.  Pt aware of s/s to call MD or go to ER.  She denies any needs or concerns at this time and d/c'ed home in stable ambulatory condition.

## 2024-07-18 NOTE — PLAN OF CARE
Problem: Adult Inpatient Plan of Care  Goal: Plan of Care Review  Outcome: Ongoing, Progressing  Flowsheets (Taken 7/18/2024 0639)  Progress: improving  Plan of Care Reviewed With: patient  Outcome Evaluation:   VSS. Pain controlled w/ meds. Up ad juani. Fundus firm   lochia scant. Labs drawn   K+ 3.2, Ca 8.4.Bottlefeeding Bonding well w/ infant.  Goal: Patient-Specific Goal (Individualized)  Outcome: Ongoing, Progressing  Flowsheets (Taken 7/18/2024 0639)  Patient-Specific Goals (Include Timeframe): desires to go home today.  Goal: Absence of Hospital-Acquired Illness or Injury  Outcome: Ongoing, Progressing  Intervention: Identify and Manage Fall Risk  Recent Flowsheet Documentation  Taken 7/18/2024 0349 by Pauline Flores RN  Safety Promotion/Fall Prevention: safety round/check completed  Taken 7/17/2024 2000 by Pauline Flores RN  Safety Promotion/Fall Prevention: safety round/check completed  Intervention: Prevent and Manage VTE (Venous Thromboembolism) Risk  Recent Flowsheet Documentation  Taken 7/18/2024 0349 by Pauline Flores RN  Activity Management: up ad juani  Taken 7/17/2024 2000 by Pauline Flores RN  Activity Management: up ad juani  VTE Prevention/Management: (ambulating) other (see comments)  Goal: Optimal Comfort and Wellbeing  Outcome: Ongoing, Progressing  Intervention: Provide Person-Centered Care  Recent Flowsheet Documentation  Taken 7/18/2024 0349 by Pauline Flores RN  Trust Relationship/Rapport:   care explained   choices provided   emotional support provided   empathic listening provided   questions answered   questions encouraged   reassurance provided   thoughts/feelings acknowledged  Taken 7/17/2024 2000 by Pauline Flores RN  Trust Relationship/Rapport:   care explained   choices provided   emotional support provided   empathic listening provided   questions answered   questions encouraged   reassurance provided   thoughts/feelings acknowledged  Goal: Readiness for  Transition of Care  Outcome: Ongoing, Progressing     Problem: Bleeding ( Delivery)  Goal: Bleeding is Controlled  Outcome: Ongoing, Progressing     Problem: Change in Fetal Wellbeing ( Delivery)  Goal: Stable Fetal Wellbeing  Outcome: Ongoing, Progressing     Problem: Infection ( Delivery)  Goal: Absence of Infection Signs and Symptoms  Outcome: Ongoing, Progressing     Problem: Respiratory Compromise ( Delivery)  Goal: Effective Oxygenation and Ventilation  Outcome: Ongoing, Progressing     Problem: Adjustment to Role Transition (Postpartum  Delivery)  Goal: Successful Maternal Role Transition  Outcome: Ongoing, Progressing     Problem: Bleeding (Postpartum  Delivery)  Goal: Hemostasis  Outcome: Ongoing, Progressing     Problem: Infection (Postpartum  Delivery)  Goal: Absence of Infection Signs and Symptoms  Outcome: Ongoing, Progressing     Problem: Pain (Postpartum  Delivery)  Goal: Acceptable Pain Control  Outcome: Ongoing, Progressing     Problem: Postoperative Nausea and Vomiting (Postpartum  Delivery)  Goal: Nausea and Vomiting Relief  Outcome: Ongoing, Progressing     Problem: Postoperative Urinary Retention (Postpartum  Delivery)  Goal: Effective Urinary Elimination  Outcome: Ongoing, Progressing   Goal Outcome Evaluation:  Plan of Care Reviewed With: patient        Progress: improving  Outcome Evaluation: VSS. Pain controlled w/ meds. Up ad juani. Fundus firm; lochia scant. Labs drawn; K+ 3.2, Ca 8.4.Bottlefeeding Bonding well w/ infant.

## 2024-07-18 NOTE — DISCHARGE SUMMARY
"Obstetrical Discharge Form    Primary OB Clinician: DAILY      Gestational Age: 38w2d    Antepartum complications: fetal growth restriction, hx of Pre-E, previous C/S, grand multip, anemia    Date of Delivery:  2024     Delivered By: Brian Gan     Delivery Type: repeat  section, low transverse incision and  indication: previous  section low transverse    Tubal Ligation: done with c/section    Baby: Liveborn male, Apgars 8/9, weight 5 #, 11 oz     Anesthesia: spinal    Intrapartum complications: None    Laceration: n/a    Feeding method: Bottle    Discharge Date: 2024; Discharge Time: 09:02 EDT    /48 (BP Location: Left arm, Patient Position: Sitting)   Pulse 69   Temp 98.7 °F (37.1 °C) (Oral)   Resp 18   Ht 160 cm (63\")   LMP  (LMP Unknown)   SpO2 97%   Breastfeeding Unknown   BMI 31.00 kg/m²      Abd: soft, fundus firm, good bowel sounds  Ext: trace edema, no pain, neg Addison's    Results from last 7 days   Lab Units 24  0531   HEMOGLOBIN g/dL 7.6*       Impression: Post-op Day #2 RLTCS    Plan:    1) Post-op Day #2 s/p RLTCS    2) Boy- bottle; declines circ    3) anemia d/t acute blood loss- Hgb 10.1g/dl on admission; EBL 1000ml;  currently on ferrous sulfate BID; repeat Hgb --> 8.0g/dl-->7.6g/dl; she is asymptomatic; increase ferrous sulfate to TID    4) hypokalemia- K+ 3.0 on admission; received 2 doses K-Dur 20 mEq; repeat K+ 2.7; pt is stable and asymptomatic; 2 more doses K-Dur 20 mEq ordered; K+ this am up to 3.2- has one more dose of K-dur scheduled prior to discharge; enc to increase dietary in take of K+ foods    5) Contraception- s/p BTL    6) Post-partum depression- reviewed warning signs       Patient given written instruction sheet.  Follow-up appointment with [facility] in 2 weeks.    TAIWO Waddell  09:02 EDT  2024    Discharge time was less than 30 minutes   "

## 2024-07-18 NOTE — PLAN OF CARE
Problem: Adult Inpatient Plan of Care  Goal: Plan of Care Review  Outcome: Met  Goal: Patient-Specific Goal (Individualized)  Outcome: Met  Goal: Absence of Hospital-Acquired Illness or Injury  Outcome: Met  Intervention: Identify and Manage Fall Risk  Recent Flowsheet Documentation  Taken 2024 by Filomena Benitez RN  Safety Promotion/Fall Prevention:   safety round/check completed   room organization consistent   nonskid shoes/slippers when out of bed   clutter free environment maintained   assistive device/personal items within reach  Taken 2024 0730 by Filomena Benitez RN  Safety Promotion/Fall Prevention: safety round/check completed  Intervention: Prevent Skin Injury  Recent Flowsheet Documentation  Taken 2024 by Filomena Benitez RN  Body Position: sitting up in bed  Intervention: Prevent and Manage VTE (Venous Thromboembolism) Risk  Recent Flowsheet Documentation  Taken 2024 by Filomena Benitez RN  Activity Management: up ad juani  Intervention: Prevent Infection  Recent Flowsheet Documentation  Taken 2024 by Filomena Benitez RN  Infection Prevention:   cohorting utilized   equipment surfaces disinfected   environmental surveillance performed   hand hygiene promoted   personal protective equipment utilized   single patient room provided   rest/sleep promoted  Goal: Optimal Comfort and Wellbeing  Outcome: Met  Intervention: Provide Person-Centered Care  Recent Flowsheet Documentation  Taken 2024 by Filomena Benitez RN  Trust Relationship/Rapport:   care explained   emotional support provided   choices provided   empathic listening provided   questions answered   questions encouraged   reassurance provided  Goal: Readiness for Transition of Care  Outcome: Met     Problem: Bleeding ( Delivery)  Goal: Bleeding is Controlled  Outcome: Met     Problem: Change in Fetal Wellbeing ( Delivery)  Goal: Stable Fetal Wellbeing  Outcome:  Met  Intervention: Promote and Monitor Fetal Wellbeing  Recent Flowsheet Documentation  Taken 2024 by Filomena Benitez RN  Body Position: sitting up in bed     Problem: Infection ( Delivery)  Goal: Absence of Infection Signs and Symptoms  Outcome: Met  Intervention: Minimize Infection Risk  Recent Flowsheet Documentation  Taken 2024 by Filomena Benitez RN  Infection Management: aseptic technique maintained  Infection Prevention:   cohorting utilized   equipment surfaces disinfected   environmental surveillance performed   hand hygiene promoted   personal protective equipment utilized   single patient room provided   rest/sleep promoted     Problem: Respiratory Compromise ( Delivery)  Goal: Effective Oxygenation and Ventilation  Outcome: Met     Problem: Adjustment to Role Transition (Postpartum  Delivery)  Goal: Successful Maternal Role Transition  Outcome: Met  Intervention: Support Maternal Role Transition  Recent Flowsheet Documentation  Taken 2024 by Filomena Benitez RN  Supportive Measures:   active listening utilized   decision-making supported   goal-setting facilitated   self-care encouraged  Parent/Child Attachment Promotion:   caring behavior modeled   cue recognition promoted   face-to-face positioning promoted   interaction encouraged   parent/caregiver presence encouraged   participation in care promoted   positive reinforcement provided   rooming-in promoted     Problem: Bleeding (Postpartum  Delivery)  Goal: Hemostasis  Outcome: Met     Problem: Infection (Postpartum  Delivery)  Goal: Absence of Infection Signs and Symptoms  Outcome: Met  Intervention: Prevent or Manage Infection  Recent Flowsheet Documentation  Taken 2024 by Filomena Benitez RN  Infection Management: aseptic technique maintained     Problem: Pain (Postpartum  Delivery)  Goal: Acceptable Pain Control  Outcome: Met     Problem: Postoperative  Nausea and Vomiting (Postpartum  Delivery)  Goal: Nausea and Vomiting Relief  Outcome: Met     Problem: Postoperative Urinary Retention (Postpartum  Delivery)  Goal: Effective Urinary Elimination  Outcome: Met   Goal Outcome Evaluation:         VSS, incision wdl, K+ trending up, pain well controlled w/ ERAS meds, up ad juani and bonding w/ baby, ready for d/c home today.

## 2024-07-22 LAB
LAB AP CASE REPORT: NORMAL
PATH REPORT.FINAL DX SPEC: NORMAL

## 2025-05-01 NOTE — PROGRESS NOTES
Pt reports that she is doing well and denies vaginal bleeding or LOF at this time. Patient states that she has had some irregular cramping and contractions and occasional vaginal pressure. Patient also states that she has  Reports active fetal movement. Reviewed when to call OB office or present to L&D for evaluation with symptoms such as decreased fetal movement, vaginal bleeding, LOF or ctxs. Pt verbalized understanding. Denies HA, visual changes or epigastric pain. Denies any additional complaints at time of appointment. Next OB appointment scheduled for 07/08/2024.    Vitals:    07/05/24 0930   BP: 133/72   Pulse: 73   Temp: 98.6 °F (37 °C)   SpO2: 99%         [TextEntry] :  A detailed set of ROS were asked and negative except those outlined in HPI.

## (undated) DEVICE — GLV SURG NEOLON 2G PF LF 6.5 STRL

## (undated) DEVICE — SUCTION CANISTER, 1000CC,SAFELINER: Brand: DEROYAL

## (undated) DEVICE — PREP SOL POVIDONE/IODINE BT 4OZ

## (undated) DEVICE — TRY SKINPREP DRYPREP

## (undated) DEVICE — SOL IRR H2O BTL 1000ML STRL

## (undated) DEVICE — PK C/SECT 40

## (undated) DEVICE — SUT VIC 0 CTX 36IN J978H

## (undated) DEVICE — HYDROGEL COATED LATEX URINE METER FOLEY TRAY,16 FR/CH (5.3 MM), 5 ML CATHETER PRE-CONNECTED TO 2000 ML DRAINAGE BAG WITH NEEDLE SAMPLING: Brand: DOVER

## (undated) DEVICE — ANTIBACTERIAL UNDYED BRAIDED (POLYGLACTIN 910), SYNTHETIC ABSORBABLE SUTURE: Brand: COATED VICRYL

## (undated) DEVICE — SUT GUT CHRM 2/0 CT1 36IN 923H

## (undated) DEVICE — GLV SURG SENSICARE W/ALOE PF LF 7.5 STRL

## (undated) DEVICE — APPL CHLORAPREP W/TINT 26ML ORNG

## (undated) DEVICE — ENSEAL X1 TISSUE SEALER, CURVED JAW, 25 CM SHAFT LENGTH: Brand: ENSEAL